# Patient Record
Sex: FEMALE | Race: WHITE | NOT HISPANIC OR LATINO
[De-identification: names, ages, dates, MRNs, and addresses within clinical notes are randomized per-mention and may not be internally consistent; named-entity substitution may affect disease eponyms.]

---

## 2017-01-06 PROBLEM — Z00.00 ENCOUNTER FOR PREVENTIVE HEALTH EXAMINATION: Status: ACTIVE | Noted: 2017-01-06

## 2017-01-08 ENCOUNTER — FORM ENCOUNTER (OUTPATIENT)
Age: 82
End: 2017-01-08

## 2017-01-09 ENCOUNTER — OUTPATIENT (OUTPATIENT)
Dept: OUTPATIENT SERVICES | Facility: HOSPITAL | Age: 82
LOS: 1 days | End: 2017-01-09
Payer: MEDICARE

## 2017-01-09 ENCOUNTER — APPOINTMENT (OUTPATIENT)
Dept: ORTHOPEDIC SURGERY | Facility: CLINIC | Age: 82
End: 2017-01-09

## 2017-01-09 VITALS — WEIGHT: 149 LBS | BODY MASS INDEX: 26.74 KG/M2 | HEIGHT: 62.5 IN

## 2017-01-09 DIAGNOSIS — Z87.39 PERSONAL HISTORY OF OTHER DISEASES OF THE MUSCULOSKELETAL SYSTEM AND CONNECTIVE TISSUE: ICD-10-CM

## 2017-01-09 DIAGNOSIS — Z86.39 PERSONAL HISTORY OF OTHER ENDOCRINE, NUTRITIONAL AND METABOLIC DISEASE: ICD-10-CM

## 2017-01-09 DIAGNOSIS — Z87.81 PERSONAL HISTORY OF (HEALED) TRAUMATIC FRACTURE: ICD-10-CM

## 2017-01-09 DIAGNOSIS — Z60.2 PROBLEMS RELATED TO LIVING ALONE: ICD-10-CM

## 2017-01-09 PROCEDURE — 73501 X-RAY EXAM HIP UNI 1 VIEW: CPT

## 2017-01-09 PROCEDURE — 73502 X-RAY EXAM HIP UNI 2-3 VIEWS: CPT | Mod: 26

## 2017-01-09 SDOH — SOCIAL STABILITY - SOCIAL INSECURITY: PROBLEMS RELATED TO LIVING ALONE: Z60.2

## 2017-02-16 ENCOUNTER — FORM ENCOUNTER (OUTPATIENT)
Age: 82
End: 2017-02-16

## 2017-02-17 ENCOUNTER — APPOINTMENT (OUTPATIENT)
Dept: ORTHOPEDIC SURGERY | Facility: CLINIC | Age: 82
End: 2017-02-17

## 2017-02-17 ENCOUNTER — OUTPATIENT (OUTPATIENT)
Dept: OUTPATIENT SERVICES | Facility: HOSPITAL | Age: 82
LOS: 1 days | End: 2017-02-17
Payer: MEDICARE

## 2017-02-17 PROCEDURE — 73501 X-RAY EXAM HIP UNI 1 VIEW: CPT

## 2017-02-17 PROCEDURE — 73501 X-RAY EXAM HIP UNI 1 VIEW: CPT | Mod: 26,LT

## 2017-03-02 ENCOUNTER — EMERGENCY (EMERGENCY)
Facility: HOSPITAL | Age: 82
LOS: 1 days | Discharge: HOME | End: 2017-03-02
Attending: EMERGENCY MEDICINE | Admitting: EMERGENCY MEDICINE
Payer: MEDICARE

## 2017-03-02 VITALS
TEMPERATURE: 98 F | RESPIRATION RATE: 16 BRPM | DIASTOLIC BLOOD PRESSURE: 75 MMHG | SYSTOLIC BLOOD PRESSURE: 156 MMHG | OXYGEN SATURATION: 100 % | HEART RATE: 77 BPM

## 2017-03-02 VITALS
TEMPERATURE: 97 F | HEART RATE: 62 BPM | OXYGEN SATURATION: 100 % | SYSTOLIC BLOOD PRESSURE: 187 MMHG | WEIGHT: 160.06 LBS | DIASTOLIC BLOOD PRESSURE: 87 MMHG | RESPIRATION RATE: 16 BRPM

## 2017-03-02 DIAGNOSIS — F03.90 UNSPECIFIED DEMENTIA, UNSPECIFIED SEVERITY, WITHOUT BEHAVIORAL DISTURBANCE, PSYCHOTIC DISTURBANCE, MOOD DISTURBANCE, AND ANXIETY: ICD-10-CM
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
## 2017-03-02 DIAGNOSIS — R53.1 WEAKNESS: ICD-10-CM

## 2017-03-02 DIAGNOSIS — Z79.899 OTHER LONG TERM (CURRENT) DRUG THERAPY: ICD-10-CM
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
## 2017-03-02 PROCEDURE — 85025 COMPLETE CBC W/AUTO DIFF WBC: CPT

## 2017-03-02 PROCEDURE — 72192 CT PELVIS W/O DYE: CPT | Mod: 26

## 2017-03-02 PROCEDURE — 85610 PROTHROMBIN TIME: CPT

## 2017-03-02 PROCEDURE — 70450 CT HEAD/BRAIN W/O DYE: CPT | Mod: 26

## 2017-03-02 PROCEDURE — 80053 COMPREHEN METABOLIC PANEL: CPT

## 2017-03-02 PROCEDURE — 72192 CT PELVIS W/O DYE: CPT

## 2017-03-02 PROCEDURE — 99284 EMERGENCY DEPT VISIT MOD MDM: CPT

## 2017-03-02 PROCEDURE — 70450 CT HEAD/BRAIN W/O DYE: CPT

## 2017-03-02 PROCEDURE — 36415 COLL VENOUS BLD VENIPUNCTURE: CPT

## 2017-03-02 PROCEDURE — 85730 THROMBOPLASTIN TIME PARTIAL: CPT

## 2017-03-02 PROCEDURE — 99284 EMERGENCY DEPT VISIT MOD MDM: CPT | Mod: 25

## 2017-03-02 NOTE — ED PROVIDER NOTE - OBJECTIVE STATEMENT
fall in the home attempting to mobilize +HHA called ems patient on buttocks when located  and no injury reported + known severe dementia

## 2017-03-02 NOTE — ED ADULT TRIAGE NOTE - CHIEF COMPLAINT QUOTE
pt BIBA from home with HHA after a mechanical fall while ambulating without walker at home just prior to arrival. Pt with hx of dementia denies head trauma LOC CP SOB or blood thinner use. Admits to pain noted to the buttock area. No obvious trauma or deformities noted. HHA heard the fall and pt was able to stand herself up afteward

## 2017-03-02 NOTE — ED PROVIDER NOTE - MEDICAL DECISION MAKING DETAILS
elderly dementia patient with HHA presents after fall onto buttocks attempting to mobilize --no injury noted or indicated + CT head and pelvis completed and d/c home with services

## 2017-03-02 NOTE — ED ADULT NURSE NOTE - OBJECTIVE STATEMENT
89 y/o female, A & O X 2, BIBA with hx of dementia and hypothyroidism after unwitnessed fall as per HHA. As per HHA, she went to the bathroom and patient attempted to ambulated without her walker and fell to the ground landing on her buttocks. Pt recalls fall but can not describe circumstances. Upon assessment, no visible signs of injury, abdomen soft and non distended, lung fields WNL, breathing is equal and unlabored, pulses palpable, lung fields WNL. Pt denies pain, LOC, SOB, weakness, fatigue, nausea, vomiting, diarrhea, fever, chills, chest pain, palpitations, blurred vision, slurred speech, recent travel. Care in progress. Awaiting disposition.

## 2017-03-02 NOTE — ED PROVIDER NOTE - ATTENDING CONTRIBUTION TO CARE
89F hx dementia, hypothyroid, s/p fall.  per HHA pt was ambulating with walker and fell onto buttock.  no LOC. no vomiting  gen- nad  heent- ncat, clear conj  cv -rrr  lungs -ctab  abd - soft, nt, nd  ext -wwp, stable pelvis  neuro - cruz  CT - no m/s/b, CT pelvis - negative for fractures. pt can be discharged with A

## 2017-04-02 ENCOUNTER — FORM ENCOUNTER (OUTPATIENT)
Age: 82
End: 2017-04-02

## 2017-04-03 ENCOUNTER — APPOINTMENT (OUTPATIENT)
Dept: ORTHOPEDIC SURGERY | Facility: CLINIC | Age: 82
End: 2017-04-03

## 2017-04-03 ENCOUNTER — OUTPATIENT (OUTPATIENT)
Dept: OUTPATIENT SERVICES | Facility: HOSPITAL | Age: 82
LOS: 1 days | End: 2017-04-03
Payer: MEDICARE

## 2017-04-03 PROCEDURE — 73501 X-RAY EXAM HIP UNI 1 VIEW: CPT | Mod: 26,LT

## 2017-04-03 PROCEDURE — 73501 X-RAY EXAM HIP UNI 1 VIEW: CPT

## 2017-06-08 ENCOUNTER — FORM ENCOUNTER (OUTPATIENT)
Age: 82
End: 2017-06-08

## 2017-06-09 ENCOUNTER — OTHER (OUTPATIENT)
Age: 82
End: 2017-06-09

## 2017-06-09 ENCOUNTER — OUTPATIENT (OUTPATIENT)
Dept: OUTPATIENT SERVICES | Facility: HOSPITAL | Age: 82
LOS: 1 days | End: 2017-06-09
Payer: MEDICARE

## 2017-06-09 ENCOUNTER — APPOINTMENT (OUTPATIENT)
Dept: ORTHOPEDIC SURGERY | Facility: CLINIC | Age: 82
End: 2017-06-09

## 2017-06-09 DIAGNOSIS — Z47.89 ENCOUNTER FOR OTHER ORTHOPEDIC AFTERCARE: ICD-10-CM

## 2017-06-09 PROCEDURE — 73502 X-RAY EXAM HIP UNI 2-3 VIEWS: CPT | Mod: 26,LT

## 2017-06-09 PROCEDURE — 73502 X-RAY EXAM HIP UNI 2-3 VIEWS: CPT

## 2017-06-21 ENCOUNTER — MEDICATION RENEWAL (OUTPATIENT)
Age: 82
End: 2017-06-21

## 2017-11-06 ENCOUNTER — MEDICATION RENEWAL (OUTPATIENT)
Age: 82
End: 2017-11-06

## 2019-01-15 ENCOUNTER — INPATIENT (INPATIENT)
Facility: HOSPITAL | Age: 84
LOS: 2 days | Discharge: HOME HEALTH | DRG: 308 | End: 2019-01-18
Attending: INTERNAL MEDICINE | Admitting: INTERNAL MEDICINE
Payer: MEDICARE

## 2019-01-15 VITALS
RESPIRATION RATE: 20 BRPM | WEIGHT: 159.61 LBS | DIASTOLIC BLOOD PRESSURE: 74 MMHG | SYSTOLIC BLOOD PRESSURE: 96 MMHG | HEART RATE: 174 BPM | TEMPERATURE: 98 F | OXYGEN SATURATION: 95 %

## 2019-01-15 DIAGNOSIS — I48.91 UNSPECIFIED ATRIAL FIBRILLATION: ICD-10-CM

## 2019-01-15 DIAGNOSIS — I50.9 HEART FAILURE, UNSPECIFIED: ICD-10-CM

## 2019-01-15 DIAGNOSIS — Z96.642 PRESENCE OF LEFT ARTIFICIAL HIP JOINT: Chronic | ICD-10-CM

## 2019-01-15 DIAGNOSIS — K21.9 GASTRO-ESOPHAGEAL REFLUX DISEASE WITHOUT ESOPHAGITIS: ICD-10-CM

## 2019-01-15 DIAGNOSIS — R09.89 OTHER SPECIFIED SYMPTOMS AND SIGNS INVOLVING THE CIRCULATORY AND RESPIRATORY SYSTEMS: ICD-10-CM

## 2019-01-15 DIAGNOSIS — D72.829 ELEVATED WHITE BLOOD CELL COUNT, UNSPECIFIED: ICD-10-CM

## 2019-01-15 DIAGNOSIS — E03.9 HYPOTHYROIDISM, UNSPECIFIED: ICD-10-CM

## 2019-01-15 LAB
ALBUMIN SERPL ELPH-MCNC: 4.1 G/DL — SIGNIFICANT CHANGE UP (ref 3.3–5)
ALP SERPL-CCNC: 117 U/L — SIGNIFICANT CHANGE UP (ref 40–120)
ALT FLD-CCNC: 7 U/L — LOW (ref 10–45)
ANION GAP SERPL CALC-SCNC: 15 MMOL/L — SIGNIFICANT CHANGE UP (ref 5–17)
APPEARANCE UR: CLEAR — SIGNIFICANT CHANGE UP
APTT BLD: 27.7 SEC — SIGNIFICANT CHANGE UP (ref 27.5–36.3)
AST SERPL-CCNC: 14 U/L — SIGNIFICANT CHANGE UP (ref 10–40)
BACTERIA # UR AUTO: PRESENT /HPF
BASOPHILS NFR BLD AUTO: 0.3 % — SIGNIFICANT CHANGE UP (ref 0–2)
BILIRUB SERPL-MCNC: 1 MG/DL — SIGNIFICANT CHANGE UP (ref 0.2–1.2)
BILIRUB UR-MCNC: NEGATIVE — SIGNIFICANT CHANGE UP
BUN SERPL-MCNC: 24 MG/DL — HIGH (ref 7–23)
CALCIUM SERPL-MCNC: 11.7 MG/DL — HIGH (ref 8.4–10.5)
CHLORIDE SERPL-SCNC: 102 MMOL/L — SIGNIFICANT CHANGE UP (ref 96–108)
CK MB CFR SERPL CALC: 1.8 NG/ML — SIGNIFICANT CHANGE UP (ref 0–6.7)
CK SERPL-CCNC: 41 U/L — SIGNIFICANT CHANGE UP (ref 25–170)
CO2 SERPL-SCNC: 22 MMOL/L — SIGNIFICANT CHANGE UP (ref 22–31)
COLOR SPEC: YELLOW — SIGNIFICANT CHANGE UP
CREAT SERPL-MCNC: 1.01 MG/DL — SIGNIFICANT CHANGE UP (ref 0.5–1.3)
DIFF PNL FLD: NEGATIVE — SIGNIFICANT CHANGE UP
EOSINOPHIL NFR BLD AUTO: 1.7 % — SIGNIFICANT CHANGE UP (ref 0–6)
EPI CELLS # UR: SIGNIFICANT CHANGE UP /HPF (ref 0–5)
GLUCOSE SERPL-MCNC: 139 MG/DL — HIGH (ref 70–99)
GLUCOSE UR QL: NEGATIVE — SIGNIFICANT CHANGE UP
HCT VFR BLD CALC: 45.8 % — HIGH (ref 34.5–45)
HGB BLD-MCNC: 14.8 G/DL — SIGNIFICANT CHANGE UP (ref 11.5–15.5)
INR BLD: 1.53 — HIGH (ref 0.88–1.16)
KETONES UR-MCNC: NEGATIVE — SIGNIFICANT CHANGE UP
LEUKOCYTE ESTERASE UR-ACNC: ABNORMAL
LYMPHOCYTES # BLD AUTO: 14.9 % — SIGNIFICANT CHANGE UP (ref 13–44)
MAGNESIUM SERPL-MCNC: 2 MG/DL — SIGNIFICANT CHANGE UP (ref 1.6–2.6)
MCHC RBC-ENTMCNC: 32.3 G/DL — SIGNIFICANT CHANGE UP (ref 32–36)
MCHC RBC-ENTMCNC: 32.8 PG — SIGNIFICANT CHANGE UP (ref 27–34)
MCV RBC AUTO: 101.6 FL — HIGH (ref 80–100)
MONOCYTES NFR BLD AUTO: 10.7 % — SIGNIFICANT CHANGE UP (ref 2–14)
NEUTROPHILS NFR BLD AUTO: 72.4 % — SIGNIFICANT CHANGE UP (ref 43–77)
NITRITE UR-MCNC: NEGATIVE — SIGNIFICANT CHANGE UP
PH UR: 6 — SIGNIFICANT CHANGE UP (ref 5–8)
PLATELET # BLD AUTO: 131 K/UL — LOW (ref 150–400)
POTASSIUM SERPL-MCNC: 4.4 MMOL/L — SIGNIFICANT CHANGE UP (ref 3.5–5.3)
POTASSIUM SERPL-SCNC: 4.4 MMOL/L — SIGNIFICANT CHANGE UP (ref 3.5–5.3)
PROT SERPL-MCNC: 7.5 G/DL — SIGNIFICANT CHANGE UP (ref 6–8.3)
PROT UR-MCNC: NEGATIVE MG/DL — SIGNIFICANT CHANGE UP
PROTHROM AB SERPL-ACNC: 17.5 SEC — HIGH (ref 10–12.9)
RAPID RVP RESULT: SIGNIFICANT CHANGE UP
RBC # BLD: 4.51 M/UL — SIGNIFICANT CHANGE UP (ref 3.8–5.2)
RBC # FLD: 13.8 % — SIGNIFICANT CHANGE UP (ref 10.3–16.9)
RBC CASTS # UR COMP ASSIST: < 5 /HPF — SIGNIFICANT CHANGE UP
SODIUM SERPL-SCNC: 139 MMOL/L — SIGNIFICANT CHANGE UP (ref 135–145)
SP GR SPEC: 1.01 — SIGNIFICANT CHANGE UP (ref 1–1.03)
T3FREE SERPL-MCNC: 1.42 PG/ML — LOW (ref 1.71–3.71)
T4 FREE SERPL-MCNC: 1.39 NG/DL — SIGNIFICANT CHANGE UP (ref 0.7–1.48)
TROPONIN T SERPL-MCNC: <0.01 NG/ML — SIGNIFICANT CHANGE UP (ref 0–0.01)
TSH SERPL-MCNC: 0.2 UIU/ML — LOW (ref 0.35–4.94)
UROBILINOGEN FLD QL: 0.2 E.U./DL — SIGNIFICANT CHANGE UP
WBC # BLD: 13 K/UL — HIGH (ref 3.8–10.5)
WBC # FLD AUTO: 13 K/UL — HIGH (ref 3.8–10.5)
WBC UR QL: ABNORMAL /HPF

## 2019-01-15 PROCEDURE — 99291 CRITICAL CARE FIRST HOUR: CPT

## 2019-01-15 PROCEDURE — 71275 CT ANGIOGRAPHY CHEST: CPT | Mod: 26

## 2019-01-15 RX ORDER — FUROSEMIDE 40 MG
40 TABLET ORAL ONCE
Qty: 0 | Refills: 0 | Status: COMPLETED | OUTPATIENT
Start: 2019-01-15 | End: 2019-01-15

## 2019-01-15 RX ORDER — ACETAMINOPHEN 500 MG
650 TABLET ORAL EVERY 6 HOURS
Qty: 0 | Refills: 0 | Status: DISCONTINUED | OUTPATIENT
Start: 2019-01-15 | End: 2019-01-18

## 2019-01-15 RX ORDER — MEMANTINE HYDROCHLORIDE 10 MG/1
10 TABLET ORAL
Qty: 0 | Refills: 0 | Status: DISCONTINUED | OUTPATIENT
Start: 2019-01-15 | End: 2019-01-18

## 2019-01-15 RX ORDER — METOPROLOL TARTRATE 50 MG
10 TABLET ORAL ONCE
Qty: 0 | Refills: 0 | Status: COMPLETED | OUTPATIENT
Start: 2019-01-15 | End: 2019-01-15

## 2019-01-15 RX ORDER — HEPARIN SODIUM 5000 [USP'U]/ML
1300 INJECTION INTRAVENOUS; SUBCUTANEOUS
Qty: 25000 | Refills: 0 | Status: DISCONTINUED | OUTPATIENT
Start: 2019-01-15 | End: 2019-01-16

## 2019-01-15 RX ORDER — METOPROLOL TARTRATE 50 MG
50 TABLET ORAL ONCE
Qty: 0 | Refills: 0 | Status: COMPLETED | OUTPATIENT
Start: 2019-01-15 | End: 2019-01-15

## 2019-01-15 RX ORDER — LEVOTHYROXINE SODIUM 125 MCG
100 TABLET ORAL DAILY
Qty: 0 | Refills: 0 | Status: DISCONTINUED | OUTPATIENT
Start: 2019-01-15 | End: 2019-01-16

## 2019-01-15 RX ORDER — SODIUM CHLORIDE 9 MG/ML
1000 INJECTION INTRAMUSCULAR; INTRAVENOUS; SUBCUTANEOUS ONCE
Qty: 0 | Refills: 0 | Status: COMPLETED | OUTPATIENT
Start: 2019-01-15 | End: 2019-01-15

## 2019-01-15 RX ORDER — FUROSEMIDE 40 MG
40 TABLET ORAL
Qty: 0 | Refills: 0 | Status: DISCONTINUED | OUTPATIENT
Start: 2019-01-16 | End: 2019-01-16

## 2019-01-15 RX ORDER — PANTOPRAZOLE SODIUM 20 MG/1
40 TABLET, DELAYED RELEASE ORAL
Qty: 0 | Refills: 0 | Status: DISCONTINUED | OUTPATIENT
Start: 2019-01-15 | End: 2019-01-18

## 2019-01-15 RX ADMIN — Medication 50 MILLIGRAM(S): at 17:28

## 2019-01-15 RX ADMIN — Medication 650 MILLIGRAM(S): at 21:14

## 2019-01-15 RX ADMIN — Medication 10 MILLIGRAM(S): at 18:51

## 2019-01-15 RX ADMIN — Medication 650 MILLIGRAM(S): at 22:14

## 2019-01-15 RX ADMIN — Medication 10 MILLIGRAM(S): at 16:57

## 2019-01-15 RX ADMIN — SODIUM CHLORIDE 2000 MILLILITER(S): 9 INJECTION INTRAMUSCULAR; INTRAVENOUS; SUBCUTANEOUS at 18:43

## 2019-01-15 RX ADMIN — Medication 40 MILLIGRAM(S): at 20:50

## 2019-01-15 RX ADMIN — HEPARIN SODIUM 13 UNIT(S)/HR: 5000 INJECTION INTRAVENOUS; SUBCUTANEOUS at 17:13

## 2019-01-15 NOTE — ED PROVIDER NOTE - ATTENDING CONTRIBUTION TO CARE
89yo F here w/ asymptomatic afib with RVR. pt is elderly but well appearing, non toxic, perfusing all extremities, but going at HR 180s upon arrival to the ED. Rate controlled w/ IV medications. family at bedside. pts pcp updated, plan for admission and anticoagulation.

## 2019-01-15 NOTE — ED ADULT NURSE NOTE - CHPI ED NUR SYMPTOMS NEG
no nausea/no vomiting/no chest pain/no dizziness/no fever/no back pain/no chills/no syncope/no congestion/no diaphoresis/no shortness of breath

## 2019-01-15 NOTE — ED PROVIDER NOTE - CHPI ED SYMPTOMS NEG
no chills/no diaphoresis/no dizziness/no cough/no syncope/no shortness of breath/no vomiting/no back pain/no chest pain/no fever/no nausea

## 2019-01-15 NOTE — H&P ADULT - ASSESSMENT
89 yo female, PMHx mild dementia (A&O x 2, no oriented to year) Hypothyroidism, GERD, h/o Left hip fracture 2017 s/p ORIF by Dr Sharma at [facility] presented to Dr Marmolejo's office after outpt physical therapist noticed LE swelling R>L. Pt went to Dr Marmolejo (PCP's) office who found her to be in new onset afib with RVR 180s and pt was sent to [facility] for further management. Pt denies chest pain, SOB, dizziness, palpitations, nausea, orthopnea. In [facility] ED BP 94/74  RR 16 Temp 97.9F O2 sat 95% room air. BNP 5250. WBC 13. No shift. Trop neg x 1. EKG AFib 172 with TWI I, AVL. Pt given a total of Metoprolol Tartrate IV 20mg IV and 50mg PO x 1 in ED. Pt's HR still in 140s. Heparin drip stated for AC. CTA chest negative for PE, showed small b/l pleural effusions. Lasix 40mg IV x 1 given for acute congestive heart failure. CCU fellow called for bedside Echo to determine pt's EF for further Afib management. Pt being admitted to [facility] for further management of new onset afib with RVR and new onset congestive heart failure management.

## 2019-01-15 NOTE — H&P ADULT - PROBLEM SELECTOR PLAN 2
- LE edema b/l R>L. Lungs decreased breath sounds b/l bases  - BNP 5250  - CTA chest PE protocol showed small b/l pleural effusions but no PE  - CCU fellow called for bedside Echo to determine EF  - Lasix 40mg IV x 1 given.   - Pt hypotensive on arrival to 90s/60s 2/2 afib with RVR. BP 120s/80s with improvement in heart rate. Avoid ACE/ARB until HR better controlled 2/2 concern for hypotension. - LE edema b/l R>L. Lungs decreased breath sounds b/l bases  - BNP 5250  - CTA chest PE protocol showed small b/l pleural effusions but no PE  - CCU fellow called for bedside Echo to determine EF showed probable normal EF. F/u official Echo in AM  - Lasix 40mg IV x 1 given.   - Pt hypotensive on arrival to 90s/60s 2/2 afib with RVR. BP 120s/80s with improvement in heart rate. Avoid ACE/ARB until HR better controlled 2/2 concern for hypotension.

## 2019-01-15 NOTE — ED ADULT NURSE NOTE - OBJECTIVE STATEMENT
89 y/o F without complaints sent in by Dr Marmolejo for evaluation for PE secondary to RLE swelling and rapid heartbeat 170s. Pt denies palpitations, SOB, CP. Bilateral 18g placed, blood sent. Medicated per order.

## 2019-01-15 NOTE — ED PROVIDER NOTE - CARE PLAN
Principal Discharge DX:	Atrial fibrillation, unspecified type Principal Discharge DX:	Atrial fibrillation with rapid ventricular response  Secondary Diagnosis:	Acute congestive heart failure, unspecified heart failure type Principal Discharge DX:	Atrial fibrillation with rapid ventricular response  Assessment and plan of treatment:	CT angio chest negative for PE; admit to cardiology  Secondary Diagnosis:	Acute congestive heart failure, unspecified heart failure type

## 2019-01-15 NOTE — H&P ADULT - HISTORY OF PRESENT ILLNESS
89 yo female, PMHx mild dementia (A&O x 2, no oriented to year) Hypothyroidism, GERD, h/o Left hip fracture 2017 s/p ORIF by Dr Sharma at [facility] presented to Dr Marmolejo's office after outpt physical therapist noticed LE swelling R>L. Pt went to Dr Marmolejo (PCP's) office who found her to be in new onset afib with RVR 180s and pt was sent to [facility] for further management. Pt denies chest pain, SOB, dizziness, palpitations, nausea, orthopnea. In [facility] ED BP 94/74  RR 16 Temp 97.9F O2 sat 95% room air. BNP 5250. WBC 13. No shift. Trop neg x 1. EKG AFib 172 with TWI I, AVL. Pt given a total of Metoprolol Tartrate IV 20mg IV and 50mg PO x 1 in ED. Pt's HR still in 140s. Heparin drip stated for AC. CTA chest negative for PE, showed small b/l pleural effusions. Lasix 40mg IV x 1 given for acute congestive heart failure. CCU fellow called for bedside Echo to determine pt's EF for further Afib management. Pt being admitted to [facility] for further management of new onset afib with RVR and new onset congestive heart failure management.

## 2019-01-15 NOTE — ED ADULT NURSE NOTE - NSIMPLEMENTINTERV_GEN_ALL_ED
Implemented All Fall with Harm Risk Interventions:  [place] to call system. Call bell, personal items and telephone within reach. Instruct patient to call for assistance. Room bathroom lighting operational. Non-slip footwear when patient is off stretcher. Physically safe environment: no spills, clutter or unnecessary equipment. Stretcher in lowest position, wheels locked, appropriate side rails in place. Provide visual cue, wrist band, yellow gown, etc. Monitor gait and stability. Monitor for mental status changes and reorient to person, place, and time. Review medications for side effects contributing to fall risk. Reinforce activity limits and safety measures with patient and family. Provide visual clues: red socks.

## 2019-01-15 NOTE — ED PROVIDER NOTE - PROGRESS NOTE DETAILS
HR decreased s/p IV lopressor 10mg x2 and PO lopressor 50mg. Also started heparin gtt and s/p 1L NS. Awaiting CT angio PE protocol

## 2019-01-15 NOTE — H&P ADULT - PROBLEM SELECTOR PLAN 1
- currently asymptomatic  - new onset afib with RVR to 180s on arrival, reduced to 140s with Lopressor 20mg IV and 50mg PO x 1.   - CCU fellow called to perform beside Echo to assess LV function to further determine rate controlling medication options  - heparin drip started for AC - currently asymptomatic  - new onset afib with RVR to 180s on arrival, reduced to 140s with Lopressor 20mg IV and 50mg PO x 1.   - CCU fellow performed beside Echo showing probable normal EF. Cardizem 10mg IV x 1 given, f/u formal Echo on 1/16/19  - heparin drip started for AC

## 2019-01-15 NOTE — ED ADULT TRIAGE NOTE - CHIEF COMPLAINT QUOTE
patient with swelling to right leg since this morning. . sent in to r/o DVT and new onset rapid a-fib. denies chest pain, sob

## 2019-01-15 NOTE — H&P ADULT - PROBLEM SELECTOR PLAN 5
- WBC 13 with on shift on admission. Pt afebrile, repeat in AM  - CTA chest PE protocol shows no signs of PNA  - UA/UCX ordered  - RVP performed, f/u results    Pt has old CONTACT Isolation order from 12/31/16 for MDR M Mor[place] in urine. No need to continue pt on contact as per Epidemiology. They will remove the order from [place] on 1/16/19 AM  Code status: FULL CODE  Dispo: pt has 24 hour home care and daughter states she will go home no matter what the recommendations are. PT consult is ordered to assess current needs.

## 2019-01-16 DIAGNOSIS — I82.409 ACUTE EMBOLISM AND THROMBOSIS OF UNSPECIFIED DEEP VEINS OF UNSPECIFIED LOWER EXTREMITY: ICD-10-CM

## 2019-01-16 DIAGNOSIS — I50.31 ACUTE DIASTOLIC (CONGESTIVE) HEART FAILURE: ICD-10-CM

## 2019-01-16 LAB
ALBUMIN SERPL ELPH-MCNC: 3.2 G/DL — LOW (ref 3.3–5)
ALP SERPL-CCNC: 108 U/L — SIGNIFICANT CHANGE UP (ref 40–120)
ALT FLD-CCNC: 17 U/L — SIGNIFICANT CHANGE UP (ref 10–45)
ANION GAP SERPL CALC-SCNC: 16 MMOL/L — SIGNIFICANT CHANGE UP (ref 5–17)
APTT BLD: 151.6 SEC — CRITICAL HIGH (ref 27.5–36.3)
APTT BLD: 159.5 SEC — CRITICAL HIGH (ref 27.5–36.3)
APTT BLD: 74.5 SEC — HIGH (ref 27.5–36.3)
APTT BLD: 87.5 SEC — HIGH (ref 27.5–36.3)
AST SERPL-CCNC: 24 U/L — SIGNIFICANT CHANGE UP (ref 10–40)
BILIRUB SERPL-MCNC: 0.7 MG/DL — SIGNIFICANT CHANGE UP (ref 0.2–1.2)
BLD GP AB SCN SERPL QL: NEGATIVE — SIGNIFICANT CHANGE UP
BUN SERPL-MCNC: 21 MG/DL — SIGNIFICANT CHANGE UP (ref 7–23)
CALCIUM SERPL-MCNC: 10.2 MG/DL — SIGNIFICANT CHANGE UP (ref 8.4–10.5)
CHLORIDE SERPL-SCNC: 104 MMOL/L — SIGNIFICANT CHANGE UP (ref 96–108)
CO2 SERPL-SCNC: 20 MMOL/L — LOW (ref 22–31)
CREAT SERPL-MCNC: 0.8 MG/DL — SIGNIFICANT CHANGE UP (ref 0.5–1.3)
GLUCOSE SERPL-MCNC: 102 MG/DL — HIGH (ref 70–99)
HCT VFR BLD CALC: 40.2 % — SIGNIFICANT CHANGE UP (ref 34.5–45)
HGB BLD-MCNC: 12.9 G/DL — SIGNIFICANT CHANGE UP (ref 11.5–15.5)
MAGNESIUM SERPL-MCNC: 1.7 MG/DL — SIGNIFICANT CHANGE UP (ref 1.6–2.6)
MCHC RBC-ENTMCNC: 32.1 G/DL — SIGNIFICANT CHANGE UP (ref 32–36)
MCHC RBC-ENTMCNC: 32.6 PG — SIGNIFICANT CHANGE UP (ref 27–34)
MCV RBC AUTO: 101.5 FL — HIGH (ref 80–100)
PLATELET # BLD AUTO: 120 K/UL — LOW (ref 150–400)
POTASSIUM SERPL-MCNC: 4 MMOL/L — SIGNIFICANT CHANGE UP (ref 3.5–5.3)
POTASSIUM SERPL-SCNC: 4 MMOL/L — SIGNIFICANT CHANGE UP (ref 3.5–5.3)
PROT SERPL-MCNC: 5.9 G/DL — LOW (ref 6–8.3)
RBC # BLD: 3.96 M/UL — SIGNIFICANT CHANGE UP (ref 3.8–5.2)
RBC # FLD: 13.7 % — SIGNIFICANT CHANGE UP (ref 10.3–16.9)
RH IG SCN BLD-IMP: POSITIVE — SIGNIFICANT CHANGE UP
SODIUM SERPL-SCNC: 140 MMOL/L — SIGNIFICANT CHANGE UP (ref 135–145)
WBC # BLD: 9.3 K/UL — SIGNIFICANT CHANGE UP (ref 3.8–10.5)
WBC # FLD AUTO: 9.3 K/UL — SIGNIFICANT CHANGE UP (ref 3.8–10.5)

## 2019-01-16 PROCEDURE — 99222 1ST HOSP IP/OBS MODERATE 55: CPT

## 2019-01-16 PROCEDURE — 93306 TTE W/DOPPLER COMPLETE: CPT | Mod: 26

## 2019-01-16 PROCEDURE — 93970 EXTREMITY STUDY: CPT | Mod: 26

## 2019-01-16 RX ORDER — MAGNESIUM OXIDE 400 MG ORAL TABLET 241.3 MG
800 TABLET ORAL ONCE
Qty: 0 | Refills: 0 | Status: COMPLETED | OUTPATIENT
Start: 2019-01-16 | End: 2019-01-16

## 2019-01-16 RX ORDER — HEPARIN SODIUM 5000 [USP'U]/ML
6000 INJECTION INTRAVENOUS; SUBCUTANEOUS EVERY 6 HOURS
Qty: 0 | Refills: 0 | Status: DISCONTINUED | OUTPATIENT
Start: 2019-01-16 | End: 2019-01-18

## 2019-01-16 RX ORDER — SODIUM CHLORIDE 9 MG/ML
250 INJECTION INTRAMUSCULAR; INTRAVENOUS; SUBCUTANEOUS ONCE
Qty: 0 | Refills: 0 | Status: COMPLETED | OUTPATIENT
Start: 2019-01-16 | End: 2019-01-16

## 2019-01-16 RX ORDER — HEPARIN SODIUM 5000 [USP'U]/ML
1000 INJECTION INTRAVENOUS; SUBCUTANEOUS
Qty: 25000 | Refills: 0 | Status: DISCONTINUED | OUTPATIENT
Start: 2019-01-16 | End: 2019-01-18

## 2019-01-16 RX ORDER — HEPARIN SODIUM 5000 [USP'U]/ML
3000 INJECTION INTRAVENOUS; SUBCUTANEOUS EVERY 6 HOURS
Qty: 0 | Refills: 0 | Status: DISCONTINUED | OUTPATIENT
Start: 2019-01-16 | End: 2019-01-18

## 2019-01-16 RX ORDER — LEVOTHYROXINE SODIUM 125 MCG
88 TABLET ORAL DAILY
Qty: 0 | Refills: 0 | Status: DISCONTINUED | OUTPATIENT
Start: 2019-01-17 | End: 2019-01-17

## 2019-01-16 RX ORDER — MAGNESIUM SULFATE 500 MG/ML
1 VIAL (ML) INJECTION ONCE
Qty: 0 | Refills: 0 | Status: DISCONTINUED | OUTPATIENT
Start: 2019-01-16 | End: 2019-01-16

## 2019-01-16 RX ADMIN — MEMANTINE HYDROCHLORIDE 10 MILLIGRAM(S): 10 TABLET ORAL at 05:49

## 2019-01-16 RX ADMIN — HEPARIN SODIUM 0 UNIT(S)/HR: 5000 INJECTION INTRAVENOUS; SUBCUTANEOUS at 07:13

## 2019-01-16 RX ADMIN — PANTOPRAZOLE SODIUM 40 MILLIGRAM(S): 20 TABLET, DELAYED RELEASE ORAL at 06:01

## 2019-01-16 RX ADMIN — MEMANTINE HYDROCHLORIDE 10 MILLIGRAM(S): 10 TABLET ORAL at 18:23

## 2019-01-16 RX ADMIN — Medication 40 MILLIGRAM(S): at 05:49

## 2019-01-16 RX ADMIN — HEPARIN SODIUM 800 UNIT(S)/HR: 5000 INJECTION INTRAVENOUS; SUBCUTANEOUS at 16:18

## 2019-01-16 RX ADMIN — MAGNESIUM OXIDE 400 MG ORAL TABLET 800 MILLIGRAM(S): 241.3 TABLET ORAL at 11:29

## 2019-01-16 RX ADMIN — Medication 100 MICROGRAM(S): at 05:49

## 2019-01-16 RX ADMIN — HEPARIN SODIUM 1000 UNIT(S)/HR: 5000 INJECTION INTRAVENOUS; SUBCUTANEOUS at 01:12

## 2019-01-16 RX ADMIN — HEPARIN SODIUM 800 UNIT(S)/HR: 5000 INJECTION INTRAVENOUS; SUBCUTANEOUS at 08:13

## 2019-01-16 RX ADMIN — SODIUM CHLORIDE 1000 MILLILITER(S): 9 INJECTION INTRAMUSCULAR; INTRAVENOUS; SUBCUTANEOUS at 09:18

## 2019-01-16 RX ADMIN — HEPARIN SODIUM 800 UNIT(S)/HR: 5000 INJECTION INTRAVENOUS; SUBCUTANEOUS at 22:20

## 2019-01-16 NOTE — PROGRESS NOTE ADULT - PROBLEM SELECTOR PLAN 6
- WBC 13 with on shift on admission. Pt afebrile, repeat WBC on 9.  - CTA chest PE protocol shows no signs of PNA  - UA showed moderate leukocytes and negative nitrites. UCX pending.   - RVP negative.    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 No need to continue pt on contact as per Epidemiology. They will remove the order from [place] on 1/16/19 AM  Code status: FULL CODE  Dispo: pt has 24 hour home care and daughter states she will go home no matter what the recommendations are. PT consult is ordered to assess current needs.

## 2019-01-16 NOTE — PHYSICAL THERAPY INITIAL EVALUATION ADULT - MODALITIES TREATMENT COMMENTS
Ambulation distance limited by tachy HR (145bpm) which returned to normal (99bpm) by end of session in supine position.

## 2019-01-16 NOTE — PROGRESS NOTE ADULT - PROBLEM SELECTOR PLAN 2
- LE edema b/l R>L improving in left LE, RLE still edematous. Lungs CTA b/l, no JVD.  - BNP 5250 on admission  - Lasix 40mg IV given on 1/15/19 PM and Lasix 40mg IV 1/16/19 AM. Pt's lungs CTA b/l, no JVD, improving LE edema. Pt was hypotensive on 1/16/19 AM and NS 250cc bolus given. Lasix currently on hold.   - Pt intermittently hypotensive during admission. Avoid ACE/ARB until HR better controlled 2/2 concern for hypotension.

## 2019-01-16 NOTE — PROGRESS NOTE ADULT - PROBLEM SELECTOR PLAN 4
- Pt on Synthroid 100mcg daily as outpt. Decreasing to Synthroid 88mcg daily.   - TSH 0.2. Free T3 mildly low and Free T4 normal.

## 2019-01-16 NOTE — CONSULT NOTE ADULT - SUBJECTIVE AND OBJECTIVE BOX
HPI:  89 yo female with mild dementia (A&O x 2, not oriented to year) Hypothyroidism, GERD, h/o Left hip fracture 2017 s/p ORIF, who presented to Dr Marmolejo's office after outpt physical therapist noticed LE swelling R>L and was found to be in afib with RVR.  EP called for further recommendations.    Both the patient and her family deny any known history of arrhythmia.  She was sent to her PMDs office after she was found to have LE edema.  When in Dr Marmolejo (PCP's) office who found her to be in new onset afib with RVR 180s and pt was sent to [facility] ER.  She was asymptomatic and denied any chest pain, SOB, dizziness, palpitations, syncope or near syncope, orthopnea. Poor rate control with a beta blocker and her EF came back normal and she was given a CCB with better rate control.  She was placed on IV heparin  .   PAST MEDICAL & SURGICAL HISTORY:  GERD (gastroesophageal reflux disease)  Dementia  Hypothyroid  History of hip replacement, total, left      No pertinent family history in first degree relatives      Social History:no smoking, no drugs, no alcohol    Inpatient Medications:   acetaminophen   Tablet .. 650 milliGRAM(s) Oral every 6 hours PRN  diltiazem    Tablet 30 milliGRAM(s) Oral two times a day  heparin  Infusion. 1000 Unit(s)/Hr IV Continuous <Continuous>  memantine 10 milliGRAM(s) Oral two times a day  pantoprazole    Tablet 40 milliGRAM(s) Oral before breakfast      Allergies: No Known Allergies      ROS:   CONSTITUTIONAL: No fever, weight loss   EYES: Pt denies  RESPIRATORY: No cough, wheezing, chills or hemoptysis; No Shortness of Breath  CARDIOVASCULAR: see HPI  GASTROINTESTINAL: Pt denies  NEUROLOGICAL: Pt denies  SKIN: Pt denies   PSYCHIATRIC: Pt denies  HEME/LYMPH: Pt denies    PHYSICAL:  T(C): 36.6 (01-16-19 @ 13:25), Max: 36.8 (01-15-19 @ 21:35)  HR: 140 (01-16-19 @ 16:41) (75 - 143)  BP: 142/97 (01-16-19 @ 16:41) (82/56 - 143/75)  RR: 17 (01-16-19 @ 16:41) (16 - 18)  SpO2: 93% (01-16-19 @ 16:41) (91% - 97%)  Appearance: No acute distress, well developed  Eyes: normal appearing conjunctiva, pupils and eyelids  Cardiovascular: irregularly irregular  Respiratory: Lungs clear to auscultation	   Neurologic:  No deficit noted  Psych: A&Ox3, normal mood/affect  Musculoskeletal: currently in bed getting LE doppler - LE edema R>L  Skin: no rash noted, normal color and pigmentation.        LABS:                        12.9   9.3   )-----------( 120      ( 16 Jan 2019 06:36 )             40.2     140  |  104  |  21  ----------------------------<  102<H>  4.0   |  20<L>  |  0.80       Mg     1.7       TPro  5.9<L>  /  Alb  3.2<L>  /  TBili  0.7  /  DBili  x   /  AST  24  /  ALT  17  /  AlkPhos  108   PT: 17.5 sec;   INR: 1.53      PTT:87.5 sec  TSH 0.24 --> plans to reduce synthroid   EKG: afib with a ventricular rate of 70    ECHO: EF 60-65% mod LAEmild pHTN    Prior EP procedures: denies    Assessment Plan:  89 yo female with mild dementia (A&O x 2, not oriented to year) Hypothyroidism, GERD, h/o Left hip fracture 2017 s/p ORIF, who presented to Dr Marmolejo's office after outpt physical therapist noticed LE swelling R>L and was found to be in afib with RVR.  While in her room she was getting a LE US with extensive DVT clot in R leg (L not scanned yet).  She will need to be on DVT dosed eliquis and after maintenance for atrial fibrillation.  Primary team states CT scan showed concern for malignancy which would be in line with DVT.  CT scan did not show a PE but I suspect she had one that could have lysed and this is what is driving her atrial fibrillation given her rapid rates.  She has a normal EF and would uptitrate as BP allows to control HR <120.  We discussed that she is a candidate for a LINDA/cardioversion - once primary team has completed work up (can be done right before discharge).  They are in agreement.  Will anticipate tomorrow or Friday pending workup.  She was not formally consented as she was still getting a US of her LE and this will need to be done prior to her LINDA/Cardioversion.  Please call [phone] with any questions or concerns.

## 2019-01-16 NOTE — PHYSICAL THERAPY INITIAL EVALUATION ADULT - PERTINENT HX OF CURRENT PROBLEM, REHAB EVAL
89 yo female, PMHx mild dementia (A&O x 2, no oriented to year) Hypothyroidism, GERD, h/o Left hip fracture 2017 s/p ORIF by Dr Sharma at [facility] presented to Dr Marmolejo's office after outpt physical therapist noticed LE swelling R>L. Pt went to Dr Marmolejo (PCP's) office who found her to be in new onset afib with RVR 180s and pt was sent to [facility] for further management

## 2019-01-16 NOTE — PHYSICAL THERAPY INITIAL EVALUATION ADULT - ADDITIONAL COMMENTS
Patient previously was able to dress herself but required assistance with all ADLs and IADLs with help of home health aide, 1 aide present 24 hrs/day. Previously used a rollator for ambulation. She was attending outpatient PT for her legs.

## 2019-01-17 ENCOUNTER — TRANSCRIPTION ENCOUNTER (OUTPATIENT)
Age: 84
End: 2019-01-17

## 2019-01-17 DIAGNOSIS — I82.431 ACUTE EMBOLISM AND THROMBOSIS OF RIGHT POPLITEAL VEIN: ICD-10-CM

## 2019-01-17 DIAGNOSIS — E07.9 DISORDER OF THYROID, UNSPECIFIED: ICD-10-CM

## 2019-01-17 LAB
ALBUMIN SERPL ELPH-MCNC: 3.3 G/DL — SIGNIFICANT CHANGE UP (ref 3.3–5)
ALP SERPL-CCNC: 96 U/L — SIGNIFICANT CHANGE UP (ref 40–120)
ALT FLD-CCNC: 11 U/L — SIGNIFICANT CHANGE UP (ref 10–45)
ANION GAP SERPL CALC-SCNC: 14 MMOL/L — SIGNIFICANT CHANGE UP (ref 5–17)
APTT BLD: 83.3 SEC — HIGH (ref 27.5–36.3)
AST SERPL-CCNC: 15 U/L — SIGNIFICANT CHANGE UP (ref 10–40)
BILIRUB SERPL-MCNC: 0.6 MG/DL — SIGNIFICANT CHANGE UP (ref 0.2–1.2)
BUN SERPL-MCNC: 22 MG/DL — SIGNIFICANT CHANGE UP (ref 7–23)
CALCIUM SERPL-MCNC: 10 MG/DL — SIGNIFICANT CHANGE UP (ref 8.4–10.5)
CHLORIDE SERPL-SCNC: 105 MMOL/L — SIGNIFICANT CHANGE UP (ref 96–108)
CO2 SERPL-SCNC: 25 MMOL/L — SIGNIFICANT CHANGE UP (ref 22–31)
CREAT SERPL-MCNC: 0.74 MG/DL — SIGNIFICANT CHANGE UP (ref 0.5–1.3)
CULTURE RESULTS: SIGNIFICANT CHANGE UP
GLUCOSE SERPL-MCNC: 101 MG/DL — HIGH (ref 70–99)
HCT VFR BLD CALC: 39 % — SIGNIFICANT CHANGE UP (ref 34.5–45)
HGB BLD-MCNC: 12.5 G/DL — SIGNIFICANT CHANGE UP (ref 11.5–15.5)
INR BLD: 1.53 — HIGH (ref 0.88–1.16)
MAGNESIUM SERPL-MCNC: 1.9 MG/DL — SIGNIFICANT CHANGE UP (ref 1.6–2.6)
MCHC RBC-ENTMCNC: 31.8 PG — SIGNIFICANT CHANGE UP (ref 27–34)
MCHC RBC-ENTMCNC: 32.1 G/DL — SIGNIFICANT CHANGE UP (ref 32–36)
MCV RBC AUTO: 99.2 FL — SIGNIFICANT CHANGE UP (ref 80–100)
PLATELET # BLD AUTO: 150 K/UL — SIGNIFICANT CHANGE UP (ref 150–400)
POTASSIUM SERPL-MCNC: 3.6 MMOL/L — SIGNIFICANT CHANGE UP (ref 3.5–5.3)
POTASSIUM SERPL-SCNC: 3.6 MMOL/L — SIGNIFICANT CHANGE UP (ref 3.5–5.3)
PROT SERPL-MCNC: 6 G/DL — SIGNIFICANT CHANGE UP (ref 6–8.3)
PROTHROM AB SERPL-ACNC: 17.5 SEC — HIGH (ref 10–12.9)
RBC # BLD: 3.93 M/UL — SIGNIFICANT CHANGE UP (ref 3.8–5.2)
RBC # FLD: 14 % — SIGNIFICANT CHANGE UP (ref 10.3–16.9)
SODIUM SERPL-SCNC: 144 MMOL/L — SIGNIFICANT CHANGE UP (ref 135–145)
SPECIMEN SOURCE: SIGNIFICANT CHANGE UP
WBC # BLD: 8.6 K/UL — SIGNIFICANT CHANGE UP (ref 3.8–10.5)
WBC # FLD AUTO: 8.6 K/UL — SIGNIFICANT CHANGE UP (ref 3.8–10.5)

## 2019-01-17 PROCEDURE — 92960 CARDIOVERSION ELECTRIC EXT: CPT

## 2019-01-17 PROCEDURE — 99232 SBSQ HOSP IP/OBS MODERATE 35: CPT

## 2019-01-17 PROCEDURE — 93320 DOPPLER ECHO COMPLETE: CPT | Mod: 26

## 2019-01-17 PROCEDURE — 93312 ECHO TRANSESOPHAGEAL: CPT | Mod: 26

## 2019-01-17 PROCEDURE — 93010 ELECTROCARDIOGRAM REPORT: CPT

## 2019-01-17 PROCEDURE — 93325 DOPPLER ECHO COLOR FLOW MAPG: CPT | Mod: 26

## 2019-01-17 RX ORDER — DILTIAZEM HCL 120 MG
5 CAPSULE, EXT RELEASE 24 HR ORAL
Qty: 125 | Refills: 0 | Status: DISCONTINUED | OUTPATIENT
Start: 2019-01-17 | End: 2019-01-17

## 2019-01-17 RX ORDER — LEVOTHYROXINE SODIUM 125 MCG
1 TABLET ORAL
Qty: 30 | Refills: 3
Start: 2019-01-17 | End: 2019-05-16

## 2019-01-17 RX ORDER — LEVOTHYROXINE SODIUM 125 MCG
75 TABLET ORAL DAILY
Qty: 0 | Refills: 0 | Status: DISCONTINUED | OUTPATIENT
Start: 2019-01-18 | End: 2019-01-18

## 2019-01-17 RX ORDER — APIXABAN 2.5 MG/1
1 TABLET, FILM COATED ORAL
Qty: 60 | Refills: 5
Start: 2019-01-17 | End: 2019-07-15

## 2019-01-17 RX ADMIN — Medication 5 MG/HR: at 01:21

## 2019-01-17 RX ADMIN — PANTOPRAZOLE SODIUM 40 MILLIGRAM(S): 20 TABLET, DELAYED RELEASE ORAL at 06:51

## 2019-01-17 RX ADMIN — MEMANTINE HYDROCHLORIDE 10 MILLIGRAM(S): 10 TABLET ORAL at 06:53

## 2019-01-17 RX ADMIN — Medication 88 MICROGRAM(S): at 06:51

## 2019-01-17 RX ADMIN — MEMANTINE HYDROCHLORIDE 10 MILLIGRAM(S): 10 TABLET ORAL at 17:48

## 2019-01-17 RX ADMIN — HEPARIN SODIUM 800 UNIT(S)/HR: 5000 INJECTION INTRAVENOUS; SUBCUTANEOUS at 20:18

## 2019-01-17 NOTE — DIETITIAN INITIAL EVALUATION ADULT. - SIGNS/SYMPTOMS
Subjective    Chief Complaint  This information was obtained from the patient  Patient is here for a wound care follow up of bilateral foot wounds.     Allergies  NKDA    HPI  This information was obtained from the patient  The following HPI elements were d 5/14/18: Pt returns for follow up. States he may not have used as much padding on foot this week and drainage to right foot has increased again.  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     5/ BP WNL. Pulse RRR. RR within normal limits. Afebrile. Within Ideal body weight range. Alert, calm, well developed, in no apparent distress.  Height/Length: 75 in (190.5 cm), Weight: 264 lbs (120 kgs), BMI: 33, Temperature: 97.9 °F (36.61 °C), Pulse: 94 bpm, The periwound skin exhibited: Edema, Callus, Moist, Maceration. The periwound skin did not exhibit: Erythema. The temperature of the periwound skin is WNL. Periwound skin does not exhibit signs or symptoms of infection.  Local Pulse is Normal.   General Not Wound Cleansing & Dressings  Cleanse with saline or wound cleanser  Barrier ointment/paste/cream - [employer]  Collagen - [place]  Alginate  Thick Foam  Kerlix  Paper tape  Change dressing every: - every 2 days unless excess drainage then daily    Compressio AEB pt requiring CHF diet management

## 2019-01-17 NOTE — DISCHARGE NOTE ADULT - CARE PROVIDERS DIRECT ADDRESSES
,DirectAddress_Unknown,[email or web address]

## 2019-01-17 NOTE — DISCHARGE NOTE ADULT - HOSPITAL COURSE
89 yo female, PMHx mild dementia (A&O x 2, no oriented to year) Hypothyroidism, GERD, h/o Left hip fracture 2017 s/p ORIF by Dr Sharma at [facility] presented to Dr Marmolejo's office after outpt physical therapist noticed LE swelling R>L. Pt went to Dr Marmolejo (PCP's) office who found her to be in new onset afib with RVR 180s and pt was sent to [facility] for further management. Pt denies chest pain, SOB, dizziness, palpitations, nausea, orthopnea. In [facility] ED BP 94/74  RR 16 Temp 97.9F O2 sat 95% room air. BNP 5250. WBC 13. No shift. Trop neg x 1. EKG AFib 172 with TWI I, AVL. Pt given a total of Metoprolol Tartrate IV 20mg IV and 50mg PO x 1 in ED. Pt's HR still in 140s. Heparin drip stated for AC. CTA chest negative for PE, showed small b/l pleural effusions. Lasix 40mg IV x 1 given for acute congestive heart failure. CCU fellow called for bedside Echo to determine pt's EF for further Afib management. Pt being admitted to [facility] for further management of new onset afib with RVR and new onset diastolic congestive heart failure management. Pt now found to have significant RLE DVT. CTA chest negative for PE. Pt now s/p LINDA/DCCV on 1/17/19 with Dr Diana. Pt will be transitioned to Eliquis 10mg BID x 7 days and then Eliquis 5mg BID. Pt's Synthroid reduced from 100mcg daily to Synthroid 75mcg daily 2/2 low TSH levels, despite normal free T4 and low free T3 levels. Pt stable for discharge and will follow up with Dr Cummings on ..... 89 yo female, PMHx mild dementia (A&O x 2, no oriented to year) Hypothyroidism, GERD, h/o Left hip fracture 2017 s/p ORIF by Dr Sharma at [facility] presented to Dr Marmolejo's office after outpt physical therapist noticed LE swelling R>L. Pt went to Dr Marmolejo (PCP's) office who found her to be in new onset afib with RVR 180s and pt was sent to [facility] for further management. Pt denies chest pain, SOB, dizziness, palpitations, nausea, orthopnea. In [facility] ED BP 94/74  RR 16 Temp 97.9F O2 sat 95% room air. BNP 5250. WBC 13. No shift. Trop neg x 1. EKG AFib 172 with TWI I, AVL. Pt given a total of Metoprolol Tartrate IV 20mg IV and 50mg PO x 1 in ED. Pt's HR still in 140s. Heparin drip stated for AC. CTA chest negative for PE, showed small b/l pleural effusions. Lasix 40mg IV x 1 given for acute congestive heart failure. CCU fellow called for bedside Echo to determine pt's EF for further Afib management. Pt being admitted to [facility] for further management of new onset afib with RVR and new onset diastolic congestive heart failure management. Pt now found to have significant RLE DVT. CTA chest negative for PE. Pt now s/p LINDA/DCCV on 1/17/19 with Dr Diana. Pt will be transitioned to Eliquis 10mg BID x 7 days and then Eliquis 5mg BID. Pt's Synthroid reduced from 100mcg daily to Synthroid 75mcg daily 2/2 low TSH levels, despite normal free T4 and low free T3 levels. Pt stable for discharge and will follow up with Dr Cummings on Thursday 1/24/19 at 3 pm 89 yo female, PMHx mild dementia (A&O x 2, no oriented to year) Hypothyroidism, GERD, h/o Left hip fracture 2017 s/p ORIF by Dr Sharma at [facility] presented to Dr Marmolejo's office after outpt physical therapist noticed LE swelling R>L. Pt went to Dr Marmolejo (PCP's) office who found her to be in new onset afib with RVR 180s and pt was sent to [facility] for further management. Pt denies chest pain, SOB, dizziness, palpitations, nausea, orthopnea. In [facility] ED BP 94/74  RR 16 Temp 97.9F O2 sat 95% room air. BNP 5250. WBC 13. No shift. Trop neg x 1. EKG AFib 172 with TWI I, AVL. Pt given a total of Metoprolol Tartrate IV 20mg IV and 50mg PO x 1 in ED. Pt's HR still in 140s. Heparin drip stated for AC. CTA chest negative for PE, showed small b/l pleural effusions. Lasix 40mg IV x 1 given for acute congestive heart failure. CCU fellow called for bedside Echo to determine pt's EF for further Afib management. Pt being admitted to [facility] for further management of new onset afib with RVR and new onset diastolic congestive heart failure management. Pt now found to have significant RLE DVT. CTA chest negative for PE. Pt now s/p LINDA/DCCV on 1/17/19 with Dr Diana. Pt will be transitioned to Eliquis 10mg BID x 7 days and then Eliquis 5mg BID. Pt's Synthroid reduced from 100mcg daily to Synthroid 75mcg daily 2/2 low TSH levels, despite normal free T4 and low free T3 levels. Pt stable for discharge and will follow up with Dr Cummings on Thursday 1/24/19 at 3 pm. Spoke with patients insurance [employer] multiple times who would not process request due to quantity of pills and reported there is a starter pack available with ~ 70 pills. Pharmacy attempted to process Starter Pack through insurance and it was still rejected and starter packs are not readily available they would have to be ordered. Spoke with Pharmacist Pito at length who will process prescription for 1 month supply and patient will contact Pharmacy 1 week prior to pills being completed to prevent interruption in therapy. Patient with short runs of SVT up to 10 beats asymptomatic. Labs reviewed. Hypomagnesemia Magnesium 1.8-Mag Ox 800 mg Po x 1 given. Hypokalemia K+3.8-Kdur 40 mEq PO x 1 given. 89 yo female, PMHx mild dementia (A&O x 2, no oriented to year) Hypothyroidism, GERD, h/o Left hip fracture 2017 s/p ORIF by Dr Sharma at [facility] presented to Dr Marmolejo's office after outpt physical therapist noticed LE swelling R>L. Pt went to Dr Marmolejo (PCP's) office who found her to be in new onset afib with RVR 180s and pt was sent to [facility] for further management. Pt denies chest pain, SOB, dizziness, palpitations, nausea, orthopnea. In [facility] ED BP 94/74  RR 16 Temp 97.9F O2 sat 95% room air. BNP 5250. WBC 13. No shift. Trop neg x 1. EKG AFib 172 with TWI I, AVL. Pt given a total of Metoprolol Tartrate IV 20mg IV and 50mg PO x 1 in ED. Pt's HR still in 140s. Heparin drip stated for AC. CTA chest negative for PE, showed small b/l pleural effusions. Lasix 40mg IV x 1 given for acute congestive heart failure. CCU fellow called for bedside Echo to determine pt's EF for further Afib management. Pt being admitted to [facility] for further management of new onset afib with RVR and new onset diastolic congestive heart failure management. Pt now found to have significant RLE DVT. CTA chest negative for PE. Pt now s/p LINDA/DCCV on 1/17/19 with Dr Diana. Pt will be transitioned to Eliquis 10mg BID x 7 days and then Eliquis 5mg BID. Pt's Synthroid reduced from 100mcg daily to Synthroid 75mcg daily 2/2 low TSH levels, despite normal free T4 and low free T3 levels. Pt stable for discharge and will follow up with Dr Cummings on Thursday 1/24/19 at 3 pm. Spoke with patients insurance [employer] multiple times who would not process request due to quantity of pills and reported there is a starter pack available with ~ 70 pills. Pharmacy attempted to process Starter Pack through insurance and it was still rejected and starter packs are not readily available they would have to be ordered. Spoke with Pharmacist Pito at length who will process prescription for 1 month supply and patient will contact Pharmacy 1 week prior to pills being completed to prevent interruption in therapy. Patient with short runs of SVT up to 10 beats asymptomatic. Labs reviewed. Hypomagnesemia Magnesium 1.8-Mag Ox 800 mg Po x 1 given. Hypokalemia K+3.8-Kdur 40 mEq PO x 1 given. Toprol XL 12.5 mg PO Daily E-Prescribed to Pharmacy.  Patient HD stable and cleared for discharge by Dr. Cummings    V/S:	BP: 130-150's/60s		HR: 60s	RR: 18	Temp: 97.1 F  Monitor-NSR 			O2 sat- 92-93%  	  GENERAL:  Sitting in bed in no acute distress  CVS: + S1 S2. RRR. No murmurs, rubs or gallops.   Pulm: CTA Bilaterally. No rhonchi, wheezes, or rales.  Abd: BS x 4. Soft NT/ND.  	Ext: No clubbing, cyanosis, or calf tenderness BLE. +Edema BLE. 89 yo female, PMHx mild dementia (A&O x 2, no oriented to year) Hypothyroidism, GERD, h/o Left hip fracture 2017 s/p ORIF by Dr Sharma at [facility] presented to Dr Marmolejo's office after outpt physical therapist noticed LE swelling R>L. Pt went to Dr Marmolejo (PCP's) office who found her to be in new onset afib with RVR 180s and pt was sent to [facility] for further management. Pt denies chest pain, SOB, dizziness, palpitations, nausea, orthopnea. In [facility] ED BP 94/74  RR 16 Temp 97.9F O2 sat 95% room air. BNP 5250. WBC 13. No shift. Trop neg x 1. EKG AFib 172 with TWI I, AVL. Pt given a total of Metoprolol Tartrate IV 20mg IV and 50mg PO x 1 in ED. Pt's HR still in 140s. Heparin drip stated for AC. CTA chest negative for PE, showed small b/l pleural effusions. Lasix 40mg IV x 1 given for acute congestive heart failure. CCU fellow called for bedside Echo to determine pt's EF for further Afib management. Pt being admitted to [facility] for further management of new onset afib with RVR and new onset diastolic congestive heart failure management. Pt now found to have significant RLE DVT. CTA chest negative for PE. Pt now s/p LINDA/DCCV on 1/17/19 with Dr Diana. Pt will be transitioned to Eliquis 10mg BID x 7 days and then Eliquis 5mg BID. Pt's Synthroid reduced from 100mcg daily to Synthroid 75mcg daily 2/2 low TSH levels, despite normal free T4 and low free T3 levels. Pt stable for discharge and will follow up with Dr Cummings on Thursday 1/24/19 at 3 pm. Spoke with patients insurance [employer] multiple times who would not process request due to quantity of pills and reported there is a starter pack available with ~ 70 pills. Pharmacy attempted to process Starter Pack through insurance and it was still rejected and starter packs are not readily available they would have to be ordered. Spoke with Pharmacist Pito at length who will process prescription for 1 month supply and patient will contact Pharmacy 1 week prior to pills being completed to prevent interruption in therapy. Patient with short runs of SVT up to 10 beats asymptomatic. Labs reviewed. Hypomagnesemia Magnesium 1.8-Mag Ox 800 mg Po x 1 given. Hypokalemia K+3.8-Kdur 40 mEq PO x 1 given. Toprol XL 12.5 mg PO Daily E-Prescribed to Pharmacy.  Patient HD stable and cleared for discharge by Dr. Cummings    V/S:	BP: 130-150's/60s		HR: 60s	RR: 18	Temp: 97.1 F  Monitor-NSR 			O2 sat- 92-93%  	  GENERAL:  Sitting in bed in no acute distress  CVS: + S1 S2. RRR. No murmurs, rubs or gallops.   Pulm: CTA Bilaterally. No rhonchi, wheezes, or rales.  Abd: BS x 4. Soft NT/ND.  	Ext: No clubbing, cyanosis, or calf tenderness BLE. +BLE Edema R>>L.. LLE 1+. RLE 2+

## 2019-01-17 NOTE — PROGRESS NOTE ADULT - PROBLEM SELECTOR PROBLEM 2
Acute deep vein thrombosis (DVT) of popliteal vein of right lower extremity
Acute diastolic congestive heart failure
Acute diastolic congestive heart failure

## 2019-01-17 NOTE — DIETITIAN INITIAL EVALUATION ADULT. - PROBLEM SELECTOR PLAN 5
- WBC 13 with on shift on admission. Pt afebrile, repeat in AM  - CTA chest PE protocol shows no signs of PNA  - UA/UCX ordered  - RVP performed, f/u results    Pt has old CONTACT Isolation order from 12/31/16 for MDR M Morgenii in urine. No need to continue pt on contact as per Epidemiology. They will remove the order from [place] on 1/16/19 AM  Code status: FULL CODE  Dispo: pt has 24 hour home care and daughter states she will go home no matter what the recommendations are. PT consult is ordered to assess current needs.

## 2019-01-17 NOTE — DISCHARGE NOTE ADULT - PLAN OF CARE
You had a rapid, irregular heart rhythm called ATRIAL FIBRILLATION. You had a procedure called a CARDIOVERSION to shock your heart into a normal heart rhythm again. Take ELIQUIS (APIXABAN) 10mg (TWO 5 mg tablets) twice a day for 7 days, then continue with Eliquis 5mg twice a day. This is a blood thinner to break up the clot in your leg and help avoid a blood clot forming in your lungs. You have a blood clot in your right leg. Your SYNTHROID (LEVOTHYROXINE) level is too high. STOP taking SYNTHROID (LEVOTHYROXINE) 100mcg daily. START taking SYNTHROID (LEVOTHYROXINE) 75mcg daily. CONTINUE taking DEXILANT 60mg daily. Your heart was stressed out when you were in atrial fibrillation and caused water to accumulate in your legs and in your lungs. You required a diuretic called LASIX (FUROSEMIDE) when your heart was beating very fast. Now you have a good fluid status and do not require LASIX (FUROSEMIDE) on a consistent basis. You were started on Toprol XL 12.5 mg By Mouth Once Daily (1/2 tab of 25 mg daily).

## 2019-01-17 NOTE — DISCHARGE NOTE ADULT - SECONDARY DIAGNOSIS.
DVT (deep venous thrombosis) Hypothyroidism GERD (gastroesophageal reflux disease) Acute diastolic CHF (congestive heart failure) SVT (supraventricular tachycardia)

## 2019-01-17 NOTE — DIETITIAN INITIAL EVALUATION ADULT. - PROBLEM SELECTOR PLAN 2
- LE edema b/l R>L. Lungs decreased breath sounds b/l bases  - BNP 5250  - CTA chest PE protocol showed small b/l pleural effusions but no PE  - CCU fellow called for bedside Echo to determine EF showed probable normal EF. F/u official Echo in AM  - Lasix 40mg IV x 1 given.   - Pt hypotensive on arrival to 90s/60s 2/2 afib with RVR. BP 120s/80s with improvement in heart rate. Avoid ACE/ARB until HR better controlled 2/2 concern for hypotension.

## 2019-01-17 NOTE — PROGRESS NOTE ADULT - PROBLEM SELECTOR PLAN 6
- WBC 13 with on shift on admission. Pt afebrile, repeat WBC on 9.  - CTA chest PE protocol shows no signs of PNA  - UA showed moderate leukocytes and negative nitrites. UCX pending.   - RVP negative.    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 No need to continue pt on contact as per Epidemiology. They will remove the order from [place] on 1/16/19 AM  Code status: FULL CODE  Dispo: pt has 24 hour home care and PT recommended home with 24 hour aids and continued home outpt physical therapy.

## 2019-01-17 NOTE — DISCHARGE NOTE ADULT - CARE PROVIDER_API CALL
Larry Marmolejo), Internal Medicine  [address] [place] [address]  Phone: [phone]  Fax: [phone]    Gomez Cummings (MD), Cardiovascular Disease; Internal Medicine  Cardiology [facility]  [address] [address]  Phone: [phone]  Fax: [phone]    Kristie Diana), Cardiac Electrophysiology; Cardiovascular Disease; Internal Medicine  [address] [place] [address]  Phone: [phone]  Fax: [phone]

## 2019-01-17 NOTE — PROGRESS NOTE ADULT - PROBLEM SELECTOR PLAN 2
- LE edema b/l R>L improving in left LE, RLE still edematous. Lungs CTA b/l, no JVD.  - BNP 5250 on admission  - Lasix 40mg IV given on 1/15/19 PM and Lasix 40mg IV 1/16/19 AM. Pt's lungs CTA b/l, no JVD, improving LE edema. Pt was hypotensive on 1/16/19 AM and NS 250cc bolus given. Lasix currently on hold.   - Pt intermittently hypotensive during admission. Avoid ACE/ARB until HR better controlled 2/2 concern for hypotension. - LE edema b/l R>L improving in left LE, RLE still edematous likely 2/2 DVT. Lungs CTA b/l, no JVD.  - BNP 5250 on admission  - Lasix 40mg IV given on 1/15/19 PM and Lasix 40mg IV 1/16/19 AM. Pt's lungs CTA b/l, no JVD, improving LE edema. Pt was hypotensive on 1/16/19 AM and NS 250cc bolus given. Lasix currently on hold.   - Pt intermittently hypotensive during admission. Avoid ACE/ARB until HR better controlled 2/2 concern for hypotension.

## 2019-01-17 NOTE — DIETITIAN INITIAL EVALUATION ADULT. - OTHER INFO
73 y.o F from home with PMHx of mild dementia, hypothyroidsm, GERD. h/o L Hip fx 2017 s/p ORIF. Pt admitted with new onset Afib with RVR and new onset CHF management.  cooks at home and is the caregiver of pt, reports good appetite, dislike salty foods,  lbs, NKFA. EP to be f/u for possible LINDA/Cardioversion. Currently tolerating DASH/TLC diet, FR 1000 mL/d with fair 50-74% PO intake of meals. Endorses good appetite. Food prefernces obtained from pt for dinner meal today. Denies N/V/D/C or pain. +BM 1/14. Skin intact. Nutrition edu provided to pt and family on CHF diet therapy. RD will f/u per protocol.

## 2019-01-17 NOTE — PROGRESS NOTE ADULT - PROBLEM SELECTOR PLAN 4
- Pt on Synthroid 100mcg daily as outpt. Dr Marmolejo wants to decrease Synthroid to 75mg daily.    - TSH 0.2. Free T3 mildly low and Free T4 normal.

## 2019-01-17 NOTE — DIETITIAN INITIAL EVALUATION ADULT. - ENERGY NEEDS
Height: 5'2" Weight: 159.3 lbs (1/17), 157.8 lbs (1/16),  lbs+/-10%, %%, BMI 29.2,   IBW used to calculate EER as per pt's current body weight >120% of IBW   Estimated nutrient needs based on [facility] SOC for maintenance in older adults.  FR 1000 mL/d per team

## 2019-01-17 NOTE — DISCHARGE NOTE ADULT - ADDITIONAL INSTRUCTIONS
FOLLOW UP with Dr Cummings on .....   FOLLOW up with Dr Diana (EP doctor) in 1 month.   FOLLOW UP with Dr Marmolejo in 1 week. FOLLOW UP with Dr Cummings on Thursday 1/24/19 at 3 PM  FOLLOW up with Dr Diana (EP doctor) in 1 month.   FOLLOW UP with Dr Marmolejo in 1 week.

## 2019-01-17 NOTE — PROGRESS NOTE ADULT - PROBLEM SELECTOR PLAN 3
Low TSH, dose reduced to 88mcg
- LE duplex showed substantial right lower leg DVT, no DVT in left leg  - CTA chest PE protocol negative for DVT but showed 9mm subpleural left lower lobe.   - Discuss further potential malignancy workup with Dr Marmolejo as per Dr Cummings
- LE duplex showed substantial right lower leg DVT, no DVT in left leg  - CTA chest PE protocol negative for DVT but showed 9mm subpleural left lower lobe.   - Discuss further potential malignancy workup with Dr Marmolejo as per Dr Cummings

## 2019-01-17 NOTE — DISCHARGE NOTE ADULT - PATIENT PORTAL LINK FT
You can access the [facility] Portal, offered by [facility], by registering with the following website: [email or web address]

## 2019-01-17 NOTE — DISCHARGE NOTE ADULT - CARE PLAN
Principal Discharge DX:	Atrial fibrillation with RVR  Goal:	You had a rapid, irregular heart rhythm called ATRIAL FIBRILLATION. You had a procedure called a CARDIOVERSION to shock your heart into a normal heart rhythm again.  Assessment and plan of treatment:	Take ELIQUIS (APIXABAN) 10mg (TWO 5 mg tablets) twice a day for 7 days, then continue with Eliquis 5mg twice a day. This is a blood thinner to break up the clot in your leg and help avoid a blood clot forming in your lungs.  Secondary Diagnosis:	DVT (deep venous thrombosis)  Goal:	You have a blood clot in your right leg.  Assessment and plan of treatment:	Take ELIQUIS (APIXABAN) 10mg (TWO 5 mg tablets) twice a day for 7 days, then continue with Eliquis 5mg twice a day. This is a blood thinner to break up the clot in your leg and help avoid a blood clot forming in your lungs.  Secondary Diagnosis:	Hypothyroidism  Goal:	Your SYNTHROID (LEVOTHYROXINE) level is too high. STOP taking SYNTHROID (LEVOTHYROXINE) 100mcg daily. START taking SYNTHROID (LEVOTHYROXINE) 75mcg daily.  Secondary Diagnosis:	GERD (gastroesophageal reflux disease)  Goal:	CONTINUE taking DEXILANT 60mg daily. Principal Discharge DX:	Atrial fibrillation with RVR  Goal:	You had a rapid, irregular heart rhythm called ATRIAL FIBRILLATION. You had a procedure called a CARDIOVERSION to shock your heart into a normal heart rhythm again.  Assessment and plan of treatment:	Take ELIQUIS (APIXABAN) 10mg (TWO 5 mg tablets) twice a day for 7 days, then continue with Eliquis 5mg twice a day. This is a blood thinner to break up the clot in your leg and help avoid a blood clot forming in your lungs.  Secondary Diagnosis:	DVT (deep venous thrombosis)  Goal:	You have a blood clot in your right leg.  Assessment and plan of treatment:	Take ELIQUIS (APIXABAN) 10mg (TWO 5 mg tablets) twice a day for 7 days, then continue with Eliquis 5mg twice a day. This is a blood thinner to break up the clot in your leg and help avoid a blood clot forming in your lungs.  Secondary Diagnosis:	Hypothyroidism  Goal:	Your SYNTHROID (LEVOTHYROXINE) level is too high. STOP taking SYNTHROID (LEVOTHYROXINE) 100mcg daily. START taking SYNTHROID (LEVOTHYROXINE) 75mcg daily.  Secondary Diagnosis:	GERD (gastroesophageal reflux disease)  Goal:	CONTINUE taking DEXILANT 60mg daily.  Secondary Diagnosis:	Acute diastolic CHF (congestive heart failure)  Goal:	Your heart was stressed out when you were in atrial fibrillation and caused water to accumulate in your legs and in your lungs. You required a diuretic called LASIX (FUROSEMIDE) when your heart was beating very fast. Now you have a good fluid status and do not require LASIX (FUROSEMIDE) on a consistent basis. Principal Discharge DX:	Atrial fibrillation with RVR  Goal:	You had a rapid, irregular heart rhythm called ATRIAL FIBRILLATION. You had a procedure called a CARDIOVERSION to shock your heart into a normal heart rhythm again.  Assessment and plan of treatment:	Take ELIQUIS (APIXABAN) 10mg (TWO 5 mg tablets) twice a day for 7 days, then continue with Eliquis 5mg twice a day. This is a blood thinner to break up the clot in your leg and help avoid a blood clot forming in your lungs.  Secondary Diagnosis:	DVT (deep venous thrombosis)  Goal:	You have a blood clot in your right leg.  Assessment and plan of treatment:	Take ELIQUIS (APIXABAN) 10mg (TWO 5 mg tablets) twice a day for 7 days, then continue with Eliquis 5mg twice a day. This is a blood thinner to break up the clot in your leg and help avoid a blood clot forming in your lungs.  Secondary Diagnosis:	Hypothyroidism  Goal:	Your SYNTHROID (LEVOTHYROXINE) level is too high. STOP taking SYNTHROID (LEVOTHYROXINE) 100mcg daily. START taking SYNTHROID (LEVOTHYROXINE) 75mcg daily.  Secondary Diagnosis:	GERD (gastroesophageal reflux disease)  Goal:	CONTINUE taking DEXILANT 60mg daily.  Secondary Diagnosis:	Acute diastolic CHF (congestive heart failure)  Goal:	Your heart was stressed out when you were in atrial fibrillation and caused water to accumulate in your legs and in your lungs. You required a diuretic called LASIX (FUROSEMIDE) when your heart was beating very fast. Now you have a good fluid status and do not require LASIX (FUROSEMIDE) on a consistent basis.  Secondary Diagnosis:	SVT (supraventricular tachycardia)  Assessment and plan of treatment:	You were started on Toprol XL 12.5 mg By Mouth Once Daily (1/2 tab of 25 mg daily).

## 2019-01-17 NOTE — DIETITIAN INITIAL EVALUATION ADULT. - PROBLEM SELECTOR PLAN 1
- currently asymptomatic  - new onset afib with RVR to 180s on arrival, reduced to 140s with Lopressor 20mg IV and 50mg PO x 1.   - CCU fellow performed beside Echo showing probable normal EF. Cardizem 10mg IV x 1 given, f/u formal Echo on 1/16/19  - heparin drip started for AC

## 2019-01-17 NOTE — DISCHARGE NOTE ADULT - MEDICATION SUMMARY - MEDICATIONS TO TAKE
I will START or STAY ON the medications listed below when I get home from the hospital:    apixaban 5 mg oral tablet  -- Take ELIQUIS 10mg (two 5mg tabs) twice a day for 7 days, then ELIQUIS 5mg (one tab) twice a day  -- Check with your doctor before becoming pregnant.  It is very important that you take or use this exactly as directed.  Do not skip doses or discontinue unless directed by your doctor.  Obtain medical advice before taking any non-prescription drugs as some may affect the action of this medication.    -- Indication: For Blood thinner for blood clot in leg (DVT) and Atrial fibrillation    Namenda 10 mg oral tablet  -- 1 tab(s) by mouth 2 times a day  -- Indication: For Memory    Dexilant 60 mg oral delayed release capsule  -- 1 cap(s) by mouth once a day  -- Indication: For Stomach acid control    levothyroxine 75 mcg (0.075 mg) oral tablet  -- 1 tab(s) by mouth once a day   -- It is very important that you take or use this exactly as directed.  Do not skip doses or discontinue unless directed by your doctor.  Medication should be taken with plenty of water.  Some non-prescription drugs may aggravate your condition.  Read all labels carefully.  If a warning appears, check with your doctor before taking.  Take medication on an empty stomach 1 hour before or 2 to 3 hours after a meal unless otherwise directed by your doctor.    -- Indication: For Thyroid disease I will START or STAY ON the medications listed below when I get home from the hospital:    apixaban 5 mg oral tablet  -- Take ELIQUIS 10mg (two 5mg tabs) twice a day for 7 days, then ELIQUIS 5mg (one tab) twice a day  -- Check with your doctor before becoming pregnant.  It is very important that you take or use this exactly as directed.  Do not skip doses or discontinue unless directed by your doctor.  Obtain medical advice before taking any non-prescription drugs as some may affect the action of this medication.    -- Indication: For Blood thinner for blood clot in leg (DVT) and Atrial fibrillation    Toprol-XL 25 mg oral tablet, extended release  -- 0.5 tab(s) by mouth once a day  -- Indication: For Heart Rate Control, Supraventricular Tachycardia    Namenda 10 mg oral tablet  -- 1 tab(s) by mouth 2 times a day  -- Indication: For Memory    Dexilant 60 mg oral delayed release capsule  -- 1 cap(s) by mouth once a day  -- Indication: For Stomach acid control    levothyroxine 75 mcg (0.075 mg) oral tablet  -- 1 tab(s) by mouth once a day   -- It is very important that you take or use this exactly as directed.  Do not skip doses or discontinue unless directed by your doctor.  Medication should be taken with plenty of water.  Some non-prescription drugs may aggravate your condition.  Read all labels carefully.  If a warning appears, check with your doctor before taking.  Take medication on an empty stomach 1 hour before or 2 to 3 hours after a meal unless otherwise directed by your doctor.    -- Indication: For Thyroid disease

## 2019-01-17 NOTE — PROGRESS NOTE ADULT - PROBLEM SELECTOR PLAN 1
- currently asymptomatic  - new onset afib with RVR to 180s on arrival, reduced to 140s with Lopressor 20mg IV and 50mg PO x 1. Gave Cardizem 10mg IV X 1 on 1/16/19. Cardizem 30mg BID ordered with holding parameters of SBP <110 and/or HR <55. Pt hypotensive on 1/16/19 AM to SBP 80s, increased to SBP 110s after 250cc bolus.   - EP consulted as pt is difficult to rate control 2/2 hypotension.   - Pt has significant DVT but ruled out for PE via CTA chest PE protocol. Pt also has hyperthyroidism and Synthroid dose is being reduced. Both could contribute to her being in new onset afib with RVR.  - NPO after midnight for LINDA/DCCV in AM after discussing with Dr Cummings.  - Echo 1/16/19 Moderate concentric LVH. Normal LV wall motion. LA moderately dilated. RA/RV normal. Mild to mod AR/MR/TR. Mild pulm HTN. PASP 41mmHg. No pericardial effusion.   - heparin drip started for AC, will switch to Eliquis 10mg BID x 7 days and then to Eliquis 5mg BID.
can increase Cardizem for better rate control, I favor a LINDA and DC CV
- currently asymptomatic  - new onset afib with RVR to 180s on arrival, reduced to 140s with Lopressor 20mg IV and 50mg PO x 1. Gave Cardizem 10mg IV X 1 on 1/16/19. Cardizem 30mg BID ordered with holding parameters of SBP <110 and/or HR <55. Pt hypotensive on 1/16/19 AM to SBP 80s, increased to SBP 110s after 250cc bolus.   - EP consulted as pt is difficult to rate control 2/2 hypotension.   - Pt now s/p successful LINDA/DCCV on 1/17/19 and is now in NSR (small P waves) with APCs.   - No standing Cardizem as pt's HR in 60s. Restart if pt goes back into Afib.   - heparin drip started for AC, on 1/18/19 AM will switch to Eliquis 10mg BID x 7 days and then to Eliquis 5mg BID.  - Pt has significant DVT but ruled out for PE via CTA chest PE protocol. Pt also has hyperthyroidism and Synthroid dose is being reduced. Both could contribute to her being in new onset afib with RVR.  - Echo 1/16/19 Moderate concentric LVH. Normal LV wall motion. LA moderately dilated. RA/RV normal. Mild to mod AR/MR/TR. Mild pulm HTN. PASP 41mmHg. No pericardial effusion.

## 2019-01-18 VITALS
DIASTOLIC BLOOD PRESSURE: 71 MMHG | HEART RATE: 75 BPM | OXYGEN SATURATION: 92 % | RESPIRATION RATE: 17 BRPM | SYSTOLIC BLOOD PRESSURE: 156 MMHG

## 2019-01-18 PROBLEM — K21.9 GASTRO-ESOPHAGEAL REFLUX DISEASE WITHOUT ESOPHAGITIS: Chronic | Status: ACTIVE | Noted: 2019-01-15

## 2019-01-18 LAB
ALBUMIN SERPL ELPH-MCNC: 3.1 G/DL — LOW (ref 3.3–5)
ALP SERPL-CCNC: 91 U/L — SIGNIFICANT CHANGE UP (ref 40–120)
ALT FLD-CCNC: 9 U/L — LOW (ref 10–45)
ANION GAP SERPL CALC-SCNC: 14 MMOL/L — SIGNIFICANT CHANGE UP (ref 5–17)
APTT BLD: 93.6 SEC — HIGH (ref 27.5–36.3)
AST SERPL-CCNC: 13 U/L — SIGNIFICANT CHANGE UP (ref 10–40)
BILIRUB SERPL-MCNC: 0.6 MG/DL — SIGNIFICANT CHANGE UP (ref 0.2–1.2)
BUN SERPL-MCNC: 22 MG/DL — SIGNIFICANT CHANGE UP (ref 7–23)
CALCIUM SERPL-MCNC: 9.8 MG/DL — SIGNIFICANT CHANGE UP (ref 8.4–10.5)
CHLORIDE SERPL-SCNC: 104 MMOL/L — SIGNIFICANT CHANGE UP (ref 96–108)
CO2 SERPL-SCNC: 25 MMOL/L — SIGNIFICANT CHANGE UP (ref 22–31)
CREAT SERPL-MCNC: 0.76 MG/DL — SIGNIFICANT CHANGE UP (ref 0.5–1.3)
GLUCOSE SERPL-MCNC: 97 MG/DL — SIGNIFICANT CHANGE UP (ref 70–99)
HCT VFR BLD CALC: 38.2 % — SIGNIFICANT CHANGE UP (ref 34.5–45)
HGB BLD-MCNC: 11.9 G/DL — SIGNIFICANT CHANGE UP (ref 11.5–15.5)
MAGNESIUM SERPL-MCNC: 1.9 MG/DL — SIGNIFICANT CHANGE UP (ref 1.6–2.6)
MCHC RBC-ENTMCNC: 31.2 G/DL — LOW (ref 32–36)
MCHC RBC-ENTMCNC: 32.1 PG — SIGNIFICANT CHANGE UP (ref 27–34)
MCV RBC AUTO: 103 FL — HIGH (ref 80–100)
PLATELET # BLD AUTO: 134 K/UL — LOW (ref 150–400)
POTASSIUM SERPL-MCNC: 3.8 MMOL/L — SIGNIFICANT CHANGE UP (ref 3.5–5.3)
POTASSIUM SERPL-SCNC: 3.8 MMOL/L — SIGNIFICANT CHANGE UP (ref 3.5–5.3)
PROT SERPL-MCNC: 5.6 G/DL — LOW (ref 6–8.3)
RBC # BLD: 3.71 M/UL — LOW (ref 3.8–5.2)
RBC # FLD: 14.1 % — SIGNIFICANT CHANGE UP (ref 10.3–16.9)
SODIUM SERPL-SCNC: 143 MMOL/L — SIGNIFICANT CHANGE UP (ref 135–145)
WBC # BLD: 8.9 K/UL — SIGNIFICANT CHANGE UP (ref 3.8–10.5)
WBC # FLD AUTO: 8.9 K/UL — SIGNIFICANT CHANGE UP (ref 3.8–10.5)

## 2019-01-18 PROCEDURE — 87633 RESP VIRUS 12-25 TARGETS: CPT

## 2019-01-18 PROCEDURE — 96374 THER/PROPH/DIAG INJ IV PUSH: CPT | Mod: XU

## 2019-01-18 PROCEDURE — 84481 FREE ASSAY (FT-3): CPT

## 2019-01-18 PROCEDURE — 80053 COMPREHEN METABOLIC PANEL: CPT

## 2019-01-18 PROCEDURE — 84484 ASSAY OF TROPONIN QUANT: CPT

## 2019-01-18 PROCEDURE — 93312 ECHO TRANSESOPHAGEAL: CPT

## 2019-01-18 PROCEDURE — 93005 ELECTROCARDIOGRAM TRACING: CPT

## 2019-01-18 PROCEDURE — 84443 ASSAY THYROID STIM HORMONE: CPT

## 2019-01-18 PROCEDURE — 85730 THROMBOPLASTIN TIME PARTIAL: CPT

## 2019-01-18 PROCEDURE — 93306 TTE W/DOPPLER COMPLETE: CPT

## 2019-01-18 PROCEDURE — 82550 ASSAY OF CK (CPK): CPT

## 2019-01-18 PROCEDURE — 71275 CT ANGIOGRAPHY CHEST: CPT

## 2019-01-18 PROCEDURE — 96376 TX/PRO/DX INJ SAME DRUG ADON: CPT

## 2019-01-18 PROCEDURE — 81001 URINALYSIS AUTO W/SCOPE: CPT

## 2019-01-18 PROCEDURE — 93970 EXTREMITY STUDY: CPT

## 2019-01-18 PROCEDURE — 86850 RBC ANTIBODY SCREEN: CPT

## 2019-01-18 PROCEDURE — 84439 ASSAY OF FREE THYROXINE: CPT

## 2019-01-18 PROCEDURE — 96375 TX/PRO/DX INJ NEW DRUG ADDON: CPT | Mod: XU

## 2019-01-18 PROCEDURE — 87486 CHLMYD PNEUM DNA AMP PROBE: CPT

## 2019-01-18 PROCEDURE — 86901 BLOOD TYPING SEROLOGIC RH(D): CPT

## 2019-01-18 PROCEDURE — 99238 HOSP IP/OBS DSCHRG MGMT 30/<: CPT

## 2019-01-18 PROCEDURE — 83880 ASSAY OF NATRIURETIC PEPTIDE: CPT

## 2019-01-18 PROCEDURE — 83735 ASSAY OF MAGNESIUM: CPT

## 2019-01-18 PROCEDURE — 87086 URINE CULTURE/COLONY COUNT: CPT

## 2019-01-18 PROCEDURE — 97161 PT EVAL LOW COMPLEX 20 MIN: CPT

## 2019-01-18 PROCEDURE — 87798 DETECT AGENT NOS DNA AMP: CPT

## 2019-01-18 PROCEDURE — 36415 COLL VENOUS BLD VENIPUNCTURE: CPT

## 2019-01-18 PROCEDURE — 85027 COMPLETE CBC AUTOMATED: CPT

## 2019-01-18 PROCEDURE — 86900 BLOOD TYPING SEROLOGIC ABO: CPT

## 2019-01-18 PROCEDURE — 87581 M.PNEUMON DNA AMP PROBE: CPT

## 2019-01-18 PROCEDURE — 85025 COMPLETE CBC W/AUTO DIFF WBC: CPT

## 2019-01-18 PROCEDURE — 85610 PROTHROMBIN TIME: CPT

## 2019-01-18 PROCEDURE — 99291 CRITICAL CARE FIRST HOUR: CPT | Mod: 25

## 2019-01-18 PROCEDURE — 82553 CREATINE MB FRACTION: CPT

## 2019-01-18 RX ORDER — MAGNESIUM OXIDE 400 MG ORAL TABLET 241.3 MG
800 TABLET ORAL ONCE
Qty: 0 | Refills: 0 | Status: COMPLETED | OUTPATIENT
Start: 2019-01-18 | End: 2019-01-18

## 2019-01-18 RX ORDER — APIXABAN 2.5 MG/1
10 TABLET, FILM COATED ORAL ONCE
Qty: 0 | Refills: 0 | Status: COMPLETED | OUTPATIENT
Start: 2019-01-18 | End: 2019-01-18

## 2019-01-18 RX ORDER — METOPROLOL TARTRATE 50 MG
0.5 TABLET ORAL
Qty: 15 | Refills: 1
Start: 2019-01-18 | End: 2019-03-18

## 2019-01-18 RX ORDER — POTASSIUM CHLORIDE 20 MEQ
40 PACKET (EA) ORAL ONCE
Qty: 0 | Refills: 0 | Status: COMPLETED | OUTPATIENT
Start: 2019-01-18 | End: 2019-01-18

## 2019-01-18 RX ADMIN — PANTOPRAZOLE SODIUM 40 MILLIGRAM(S): 20 TABLET, DELAYED RELEASE ORAL at 05:39

## 2019-01-18 RX ADMIN — MEMANTINE HYDROCHLORIDE 10 MILLIGRAM(S): 10 TABLET ORAL at 05:39

## 2019-01-18 RX ADMIN — Medication 40 MILLIEQUIVALENT(S): at 10:16

## 2019-01-18 RX ADMIN — APIXABAN 10 MILLIGRAM(S): 2.5 TABLET, FILM COATED ORAL at 14:45

## 2019-01-18 RX ADMIN — HEPARIN SODIUM 800 UNIT(S)/HR: 5000 INJECTION INTRAVENOUS; SUBCUTANEOUS at 07:27

## 2019-01-18 RX ADMIN — MAGNESIUM OXIDE 400 MG ORAL TABLET 800 MILLIGRAM(S): 241.3 TABLET ORAL at 10:17

## 2019-01-18 RX ADMIN — Medication 75 MICROGRAM(S): at 05:40

## 2019-01-18 NOTE — PROGRESS NOTE ADULT - REASON FOR ADMISSION
new onset afib with RVR

## 2019-01-18 NOTE — PROGRESS NOTE ADULT - SUBJECTIVE AND OBJECTIVE BOX
Interventional Cardiology PA Adult Progress Note    CC: RLE edema  Subjective Assessment:    RLE edema.    12 point ROS otherwise negative except as stated in HPI and subjective. 	  MEDICATIONS:  diltiazem    Tablet 30 milliGRAM(s) Oral two times a day  furosemide   Injectable 40 milliGRAM(s) IV Push two times a day  acetaminophen   Tablet .. 650 milliGRAM(s) Oral every 6 hours PRN  memantine 10 milliGRAM(s) Oral two times a day  pantoprazole    Tablet 40 milliGRAM(s) Oral before breakfast  heparin  Infusion. 1000 Unit(s)/Hr IV Continuous <Continuous>  heparin  Injectable 6000 Unit(s) IV Push every 6 hours PRN  heparin  Injectable 3000 Unit(s) IV Push every 6 hours PRN    [PHYSICAL EXAM:  TELEMETRY: afib with RVR  T(C): 36.6 (01-16-19 @ 13:25), Max: 36.8 (01-15-19 @ 21:35)  HR: 140 (01-16-19 @ 16:41) (75 - 143)  BP: 142/97 (01-16-19 @ 16:41) (82/56 - 143/75)  RR: 17 (01-16-19 @ 16:41) (16 - 18)  SpO2: 93% (01-16-19 @ 16:41) (91% - 97%)  Wt(kg): --  I&O's Summary    15 Joe 2019 07:01  -  16 Jan 2019 07:00  --------------------------------------------------------  IN: 112 mL / OUT: 625 mL / NET: -513 mL    16 Jan 2019 07:01  -  16 Jan 2019 16:57  --------------------------------------------------------  IN: 120 mL / OUT: 380 mL / NET: -260 mL      Height (cm): 157.48 (01-15 @ 20:38)  Becker: none  Central/PICC/Mid Line: none                                        Appearance: Normal	  HEENT:   Normal oral mucosa, PERRL, EOMI	  Neck: Supple, -JVD   Cardiovascular: Normal S1 S2, No JVD, No murmurs,   Respiratory: Lungs clear to auscultation b/l, No Rales, Rhonchi, Wheezing	  Gastrointestinal:  Soft, Non-tender, + BS	  Skin: No rashes, No ecchymoses, No cyanosis  Extremities: + 2 LE edema on left leg, +3 LE edema right leg  Vascular: Peripheral pulses palpable 2+ bilaterally  Neurologic: Non-focal  Psychiatry: A & O x 3, Mood & affect appropriate    LABS:                      12.9   9.3   )-----------( 120      ( 16 Jan 2019 06:36 )             40.2     01-16    140  |  104  |  21  ----------------------------<  102<H>  4.0   |  20<L>  |  0.80    Ca    10.2      16 Jan 2019 06:33  Mg     1.7     01-16    TPro  5.9<L>  /  Alb  3.2<L>  /  TBili  0.7  /  DBili  x   /  AST  24  /  ALT  17  /  AlkPhos  108  01-16    PT/INR - ( 15 Joe 2019 16:39 )   PT: 17.5 sec;   INR: 1.53          PTT - ( 16 Jan 2019 15:26 )  PTT:87.5 sec    ASSESSMENT/PLAN:
Awake and alert VS stable In RR with ectopy  Chest is clear  Edema unchanged
Awake and alert VS stable Remains in AF with slower VR
Comfortable this am Remains in AF with slower VR RLE edema decreased
INTERVAL HISTORY:  DVT(no PE), rapid AF  	  MEDICATIONS:  diltiazem    Tablet 30 milliGRAM(s) Oral two times a day  acetaminophen   Tablet .. 650 milliGRAM(s) Oral every 6 hours PRN  memantine 10 milliGRAM(s) Oral two times a day    pantoprazole    Tablet 40 milliGRAM(s) Oral before breakfast    levothyroxine 88 MICROGram(s) Oral daily    heparin  Infusion. 1000 Unit(s)/Hr IV Continuous <Continuous>  heparin  Injectable 6000 Unit(s) IV Push every 6 hours PRN  heparin  Injectable 3000 Unit(s) IV Push every 6 hours PRN        PHYSICAL EXAM:  T(C): 36.4 (01-17-19 @ 06:03), Max: 37.2 (01-16-19 @ 22:03)  HR: 100 (01-17-19 @ 06:17) (83 - 143)  BP: 127/69 (01-17-19 @ 06:17) (82/56 - 145/78)  RR: 16 (01-17-19 @ 06:17) (16 - 18)  SpO2: 92% (01-17-19 @ 06:17) (90% - 95%)  Wt(kg): --  I&O's Summary    16 Jan 2019 07:01  -  17 Jan 2019 07:00  --------------------------------------------------------  IN: 293.5 mL / OUT: 380 mL / NET: -86.5 mL          Appearance: Normal	  HEENT:   Normal oral mucosa, PERRL, EOMI	  Lymphatic: No lymphadenopathy  Cardiovascular: Irregular, variable S1 S2, No JVD, No murmurs,    Respiratory: Lungs clear to auscultation	  Psychiatry: A & O x 3, Mood & affect appropriate  Gastrointestinal:  Soft, Non-tender, + BS	  Skin: No rashes, No ecchymoses, No cyanosis  Neurologic: Non-focal  Extremities:   No clubbing, cyanosis, RLE markedly swollen edema  Vascular: Peripheral pulses palpable 2+ bilaterally    TELEMETRY: 	    ECG:  	  RADIOLOGY:   DIAGNOSTIC TESTING:  [ ] Echocardiogram:  [ ]  Catheterization:  [ ] Stress Test:    OTHER: 	    LABS:	 	    CARDIAC MARKERS:                                  12.5   8.6   )-----------( 150      ( 17 Jan 2019 07:28 )             39.0     01-17    144  |  105  |  22  ----------------------------<  101<H>  3.6   |  25  |  0.74    Ca    10.0      17 Jan 2019 07:28  Mg     1.9     01-17    TPro  6.0  /  Alb  3.3  /  TBili  0.6  /  DBili  x   /  AST  15  /  ALT  11  /  AlkPhos  96  01-17    proBNP:   Lipid Profile:   HgA1c:   TSH:     ASSESSMENT/PLAN:
Interventional Cardiology PA Adult Progress Note    Subjective Assessment:  	  MEDICATIONS:        acetaminophen   Tablet .. 650 milliGRAM(s) Oral every 6 hours PRN  memantine 10 milliGRAM(s) Oral two times a day    pantoprazole    Tablet 40 milliGRAM(s) Oral before breakfast      heparin  Infusion. 1000 Unit(s)/Hr IV Continuous <Continuous>  heparin  Injectable 6000 Unit(s) IV Push every 6 hours PRN  heparin  Injectable 3000 Unit(s) IV Push every 6 hours PRN      	    [PHYSICAL EXAM:  TELEMETRY:  T(C): 36.7 (01-17-19 @ 18:00), Max: 37.2 (01-16-19 @ 22:03)  HR: 69 (01-17-19 @ 17:50) (69 - 136)  BP: 140/60 (01-17-19 @ 17:50) (94/50 - 145/78)  RR: 17 (01-17-19 @ 17:50) (16 - 18)  SpO2: 98% (01-17-19 @ 17:50) (90% - 98%)  Wt(kg): --  I&O's Summary    16 Jan 2019 07:01  -  17 Jan 2019 07:00  --------------------------------------------------------  IN: 293.5 mL / OUT: 380 mL / NET: -86.5 mL    17 Jan 2019 07:01  -  17 Jan 2019 19:09  --------------------------------------------------------  IN: 0 mL / OUT: 0 mL / NET: 0 mL        Becker:  Central/PICC/Mid Line:                                         Appearance: Normal	  HEENT:   Normal oral mucosa, PERRL, EOMI	  Neck: Supple, + JVD/ - JVD; Carotid Bruit   Cardiovascular: Normal S1 S2, No JVD, No murmurs,   Respiratory: Lungs clear to auscultation/Decreased Breath Sounds/No Rales, Rhonchi, Wheezing	  Gastrointestinal:  Soft, Non-tender, + BS	  Skin: No rashes, No ecchymoses, No cyanosis  Extremities: Normal range of motion, No clubbing, cyanosis or edema  Vascular: Peripheral pulses palpable 2+ bilaterally  Neurologic: Non-focal  Psychiatry: A & O x 3, Mood & affect appropriate    LABS:                          12.5   8.6   )-----------( 150      ( 17 Jan 2019 07:28 )             39.0     01-17    144  |  105  |  22  ----------------------------<  101<H>  3.6   |  25  |  0.74    Ca    10.0      17 Jan 2019 07:28  Mg     1.9     01-17    TPro  6.0  /  Alb  3.3  /  TBili  0.6  /  DBili  x   /  AST  15  /  ALT  11  /  AlkPhos  96  01-17  PT/INR - ( 17 Jan 2019 07:28 )   PT: 17.5 sec;   INR: 1.53          PTT - ( 17 Jan 2019 07:28 )  PTT:83.3 sec    ASSESSMENT/PLAN:

## 2019-01-18 NOTE — PROGRESS NOTE ADULT - ASSESSMENT
89 yo female, PMHx mild dementia (A&O x 2, no oriented to year) Hypothyroidism, GERD, h/o Left hip fracture 2017 s/p ORIF by Dr Sharma at [facility] presented to Dr Marmolejo's office after outpt physical therapist noticed LE swelling R>L. Pt went to Dr Marmolejo (PCP's) office who found her to be in new onset afib with RVR 180s and pt was sent to [facility] for further management. Pt denies chest pain, SOB, dizziness, palpitations, nausea, orthopnea. In [facility] ED BP 94/74  RR 16 Temp 97.9F O2 sat 95% room air. BNP 5250. WBC 13. No shift. Trop neg x 1. EKG AFib 172 with TWI I, AVL. Pt given a total of Metoprolol Tartrate IV 20mg IV and 50mg PO x 1 in ED. Pt's HR still in 140s. Heparin drip stated for AC. CTA chest negative for PE, showed small b/l pleural effusions. Lasix 40mg IV x 1 given for acute congestive heart failure. CCU fellow called for bedside Echo to determine pt's EF for further Afib management. Pt being admitted to [facility] for further management of new onset afib with RVR and new onset congestive heart failure management.
89 yo female, PMHx mild dementia (A&O x 2, no oriented to year) Hypothyroidism, GERD, h/o Left hip fracture 2017 s/p ORIF by Dr Sharma at [facility] presented to Dr Marmolejo's office after outpt physical therapist noticed LE swelling R>L. Pt went to Dr Marmolejo (PCP's) office who found her to be in new onset afib with RVR 180s and pt was sent to [facility] for further management. Pt denies chest pain, SOB, dizziness, palpitations, nausea, orthopnea. In [facility] ED BP 94/74  RR 16 Temp 97.9F O2 sat 95% room air. BNP 5250. WBC 13. No shift. Trop neg x 1. EKG AFib 172 with TWI I, AVL. Pt given a total of Metoprolol Tartrate IV 20mg IV and 50mg PO x 1 in ED. Pt's HR still in 140s. Heparin drip stated for AC. CTA chest negative for PE, showed small b/l pleural effusions. Lasix 40mg IV x 1 given for acute congestive heart failure. CCU fellow called for bedside Echo to determine pt's EF for further Afib management. Pt being admitted to [facility] for further management of new onset afib with RVR and new onset congestive heart failure management.
Discussed with Dr. Cummings re rate control vs cardioversion
Stable for discharge on eliquis
Would decrease synthroid dose  Obtain RLE doppler  Will discuss with cardiology re AF and rate control

## 2019-01-31 DIAGNOSIS — F03.90 UNSPECIFIED DEMENTIA WITHOUT BEHAVIORAL DISTURBANCE: ICD-10-CM

## 2019-01-31 DIAGNOSIS — K21.9 GASTRO-ESOPHAGEAL REFLUX DISEASE WITHOUT ESOPHAGITIS: ICD-10-CM

## 2019-01-31 DIAGNOSIS — I48.91 UNSPECIFIED ATRIAL FIBRILLATION: ICD-10-CM

## 2019-01-31 DIAGNOSIS — E03.9 HYPOTHYROIDISM, UNSPECIFIED: ICD-10-CM

## 2019-01-31 DIAGNOSIS — Z96.642 PRESENCE OF LEFT ARTIFICIAL HIP JOINT: ICD-10-CM

## 2019-01-31 DIAGNOSIS — I50.31 ACUTE DIASTOLIC (CONGESTIVE) HEART FAILURE: ICD-10-CM

## 2019-01-31 DIAGNOSIS — E87.6 HYPOKALEMIA: ICD-10-CM

## 2019-01-31 DIAGNOSIS — R09.89 OTHER SPECIFIED SYMPTOMS AND SIGNS INVOLVING THE CIRCULATORY AND RESPIRATORY SYSTEMS: ICD-10-CM

## 2019-01-31 DIAGNOSIS — Z87.81 PERSONAL HISTORY OF (HEALED) TRAUMATIC FRACTURE: ICD-10-CM

## 2019-01-31 DIAGNOSIS — R07.9 CHEST PAIN, UNSPECIFIED: ICD-10-CM

## 2019-01-31 DIAGNOSIS — I82.401 ACUTE EMBOLISM AND THROMBOSIS OF UNSPECIFIED DEEP VEINS OF RIGHT LOWER EXTREMITY: ICD-10-CM

## 2019-01-31 DIAGNOSIS — E83.42 HYPOMAGNESEMIA: ICD-10-CM

## 2019-01-31 DIAGNOSIS — D72.829 ELEVATED WHITE BLOOD CELL COUNT, UNSPECIFIED: ICD-10-CM

## 2019-01-31 DIAGNOSIS — I47.1 SUPRAVENTRICULAR TACHYCARDIA: ICD-10-CM

## 2019-02-04 ENCOUNTER — APPOINTMENT (OUTPATIENT)
Dept: HEART AND VASCULAR | Facility: CLINIC | Age: 84
End: 2019-02-04
Payer: MEDICARE

## 2019-02-04 VITALS
HEIGHT: 62.5 IN | TEMPERATURE: 98.3 F | HEART RATE: 65 BPM | OXYGEN SATURATION: 97 % | WEIGHT: 154.13 LBS | DIASTOLIC BLOOD PRESSURE: 60 MMHG | BODY MASS INDEX: 27.65 KG/M2 | SYSTOLIC BLOOD PRESSURE: 140 MMHG

## 2019-02-04 DIAGNOSIS — Z83.3 FAMILY HISTORY OF DIABETES MELLITUS: ICD-10-CM

## 2019-02-04 PROCEDURE — 99214 OFFICE O/P EST MOD 30 MIN: CPT

## 2019-02-04 PROCEDURE — 93000 ELECTROCARDIOGRAM COMPLETE: CPT

## 2019-02-04 RX ORDER — TRAMADOL HYDROCHLORIDE 25 MG/1
TABLET, COATED ORAL
Refills: 0 | Status: DISCONTINUED | COMMUNITY
End: 2019-02-04

## 2019-02-04 RX ORDER — MEMANTINE HYDROCHLORIDE 10 MG/1
10 TABLET ORAL
Refills: 0 | Status: ACTIVE | COMMUNITY

## 2019-02-04 RX ORDER — LEVOTHYROXINE SODIUM 0.07 MG/1
75 TABLET ORAL
Refills: 0 | Status: ACTIVE | COMMUNITY

## 2019-02-04 RX ORDER — DEXLANSOPRAZOLE 60 MG/1
60 CAPSULE, DELAYED RELEASE ORAL
Refills: 0 | Status: ACTIVE | COMMUNITY

## 2019-02-04 RX ORDER — CELECOXIB 200 MG/1
200 CAPSULE ORAL
Qty: 60 | Refills: 0 | Status: DISCONTINUED | COMMUNITY
Start: 2017-06-09 | End: 2019-02-04

## 2019-02-04 RX ORDER — METOPROLOL SUCCINATE 25 MG/1
25 TABLET, EXTENDED RELEASE ORAL
Qty: 45 | Refills: 2 | Status: ACTIVE | COMMUNITY

## 2019-02-04 NOTE — REASON FOR VISIT
[FreeTextEntry1] : 89 y/o F recently in [facility] with AF, DVT but no PE after presenting with marked LE edema on right.  She was found to have mild to moderate MR, TR and AI.  PAP was 41mm Hg and her GLENN was also 41.  She was  DC CV to NSR and started on Eliquis.\par \par EKG: NSR, normal axis, short NV interval, no ST-Tw abnormalities. 2/4/19

## 2019-02-04 NOTE — ASSESSMENT
[FreeTextEntry1] : PAF- The patient's [id number] score = 3, continue Eliquis.  She is in NSR today \par \par DVT- edema much better\par \par Valvular heart disease- Mild to moderate MR, TR and AI.  Need to watch for CHF.  Also the TR will contribute to her LE edema which is much better since the hospital\par

## 2019-02-04 NOTE — REVIEW OF SYSTEMS
[Feeling Fatigued] : feeling fatigued [Lower Ext Edema] : lower extremity edema [Memory Lapses Or Loss] : memory lapses or loss [Negative] : Heme/Lymph

## 2019-02-04 NOTE — PHYSICAL EXAM
[Nail Clubbing] : no clubbing of the fingernails [Cyanosis, Localized] : no localized cyanosis [Petechial Hemorrhages (___cm)] : no petechial hemorrhages [Skin Color & Pigmentation] : normal skin color and pigmentation [No Venous Stasis] : no venous stasis [Skin Lesions] : no skin lesions [No Skin Ulcers] : no skin ulcer [No Xanthoma] : no  xanthoma was observed [Oriented To Time, Place, And Person] : oriented to person, place, and time [Affect] : the affect was normal [Mood] : the mood was normal [No Anxiety] : not feeling anxious [General Appearance - Well Developed] : well developed [Normal Appearance] : normal appearance [Well Groomed] : well groomed [General Appearance - Well Nourished] : well nourished [No Deformities] : no deformities [General Appearance - In No Acute Distress] : no acute distress [Normal Conjunctiva] : the conjunctiva exhibited no abnormalities [Eyelids - No Xanthelasma] : the eyelids demonstrated no xanthelasmas [Normal Oral Mucosa] : normal oral mucosa [No Oral Pallor] : no oral pallor [No Oral Cyanosis] : no oral cyanosis [Respiration, Rhythm And Depth] : normal respiratory rhythm and effort [Exaggerated Use Of Accessory Muscles For Inspiration] : no accessory muscle use [Auscultation Breath Sounds / Voice Sounds] : lungs were clear to auscultation bilaterally [Heart Rate And Rhythm] : heart rate and rhythm were normal [Heart Sounds] : normal S1 and S2 [Murmurs] : no murmurs present [Abdomen Soft] : soft [Abdomen Tenderness] : non-tender [] : no hepato-splenomegaly [Abdomen Mass (___ Cm)] : no abdominal mass palpated [FreeTextEntry1] : RLE edema

## 2019-02-25 ENCOUNTER — APPOINTMENT (OUTPATIENT)
Dept: HEART AND VASCULAR | Facility: CLINIC | Age: 84
End: 2019-02-25

## 2019-04-01 ENCOUNTER — APPOINTMENT (OUTPATIENT)
Dept: HEART AND VASCULAR | Facility: CLINIC | Age: 84
End: 2019-04-01
Payer: MEDICARE

## 2019-04-01 VITALS
HEIGHT: 62.5 IN | WEIGHT: 146.98 LBS | TEMPERATURE: 97.7 F | DIASTOLIC BLOOD PRESSURE: 60 MMHG | OXYGEN SATURATION: 98 % | SYSTOLIC BLOOD PRESSURE: 150 MMHG | BODY MASS INDEX: 26.37 KG/M2 | HEART RATE: 84 BPM

## 2019-04-01 DIAGNOSIS — I82.411 ACUTE EMBOLISM AND THROMBOSIS OF RIGHT FEMORAL VEIN: ICD-10-CM

## 2019-04-01 PROCEDURE — 99214 OFFICE O/P EST MOD 30 MIN: CPT

## 2019-04-01 NOTE — REASON FOR VISIT
[FreeTextEntry1] : 89 y/o F recently in [facility] (1/15-18/2019)with AF, DVT but no PE after presenting with marked LE edema on right.  She was found to have mild to moderate MR, TR and AI.  PAP was 41mm Hg and her GLENN was also 41.  She was  DC CV to NSR and started on Eliquis.\par 4/1/19- edema resolved\par \par EKG: NSR, normal axis, short CT interval, no ST-Tw abnormalities. 2/4/19

## 2019-04-01 NOTE — REVIEW OF SYSTEMS
[Feeling Fatigued] : feeling fatigued [Memory Lapses Or Loss] : memory lapses or loss [Negative] : Heme/Lymph

## 2019-04-01 NOTE — PHYSICAL EXAM
[General Appearance - Well Developed] : well developed [Normal Appearance] : normal appearance [Well Groomed] : well groomed [General Appearance - Well Nourished] : well nourished [No Deformities] : no deformities [General Appearance - In No Acute Distress] : no acute distress [Normal Conjunctiva] : the conjunctiva exhibited no abnormalities [Eyelids - No Xanthelasma] : the eyelids demonstrated no xanthelasmas [Normal Oral Mucosa] : normal oral mucosa [No Oral Pallor] : no oral pallor [No Oral Cyanosis] : no oral cyanosis [Respiration, Rhythm And Depth] : normal respiratory rhythm and effort [Exaggerated Use Of Accessory Muscles For Inspiration] : no accessory muscle use [Auscultation Breath Sounds / Voice Sounds] : lungs were clear to auscultation bilaterally [Heart Rate And Rhythm] : heart rate and rhythm were normal [Heart Sounds] : normal S1 and S2 [Murmurs] : no murmurs present [Abdomen Soft] : soft [Abdomen Tenderness] : non-tender [Abdomen Mass (___ Cm)] : no abdominal mass palpated [Nail Clubbing] : no clubbing of the fingernails [Cyanosis, Localized] : no localized cyanosis [Petechial Hemorrhages (___cm)] : no petechial hemorrhages [Skin Color & Pigmentation] : normal skin color and pigmentation [] : no rash [No Venous Stasis] : no venous stasis [Skin Lesions] : no skin lesions [No Skin Ulcers] : no skin ulcer [No Xanthoma] : no  xanthoma was observed [Oriented To Time, Place, And Person] : oriented to person, place, and time [Affect] : the affect was normal [Mood] : the mood was normal [No Anxiety] : not feeling anxious [FreeTextEntry1] : RLE edema was resolved, superficial varicosities

## 2019-04-01 NOTE — ASSESSMENT
[FreeTextEntry1] : PAF- The patient's [id number] score = 3, continue Eliquis.  She is in NSR today with ectopics\par \par DVT- edema much better\par \par Valvular heart disease- Mild to moderate MR, TR and AI.  Need to watch for CHF.  Also the TR will contribute to her LE edema which is much better since the hospital.  Pt and daughter told to watch for increasing SOB and B/L LE edema.\par

## 2019-08-01 ENCOUNTER — APPOINTMENT (OUTPATIENT)
Dept: HEART AND VASCULAR | Facility: CLINIC | Age: 84
End: 2019-08-01
Payer: MEDICARE

## 2019-08-01 VITALS
HEIGHT: 62.5 IN | HEART RATE: 68 BPM | BODY MASS INDEX: 27.99 KG/M2 | SYSTOLIC BLOOD PRESSURE: 132 MMHG | OXYGEN SATURATION: 97 % | WEIGHT: 156 LBS | DIASTOLIC BLOOD PRESSURE: 60 MMHG | TEMPERATURE: 97.9 F

## 2019-08-01 PROCEDURE — 99214 OFFICE O/P EST MOD 30 MIN: CPT

## 2019-08-01 RX ORDER — DOCUSATE SODIUM 100 MG/1
CAPSULE ORAL
Refills: 0 | Status: DISCONTINUED | COMMUNITY
End: 2019-08-01

## 2019-08-01 NOTE — REASON FOR VISIT
[FreeTextEntry1] : 89 y/o F recently in [facility] (1/15-18/2019)with AF, DVT but no PE after presenting with marked LE edema on right.  She was found to have mild to moderate MR, TR and AI.  PAP was 41mm Hg and her GLENN was also 41.  She was  DC CV to NSR and started on Eliquis.\par 4/1/19- edema resolved\par 8/1/19- doing well.\par \par EKG: NSR, normal axis, short GA interval, no ST-Tw abnormalities. 2/4/19

## 2019-08-01 NOTE — ASSESSMENT
[FreeTextEntry1] : PAF- The patient's [id number] score = 3, continue Eliquis.  She is in NSR today by exam\par \par DVT- edema much better\par \par Valvular heart disease- Mild to moderate MR, TR and AI.  Need to watch for CHF.  Also the TR will contribute to her LE edema which is much better since the hospital.  Pt and daughter told to watch for increasing SOB and B/L LE edema.\par

## 2019-08-01 NOTE — PHYSICAL EXAM
[General Appearance - Well Developed] : well developed [Normal Appearance] : normal appearance [Well Groomed] : well groomed [General Appearance - Well Nourished] : well nourished [No Deformities] : no deformities [General Appearance - In No Acute Distress] : no acute distress [Normal Conjunctiva] : the conjunctiva exhibited no abnormalities [Eyelids - No Xanthelasma] : the eyelids demonstrated no xanthelasmas [Normal Oral Mucosa] : normal oral mucosa [No Oral Pallor] : no oral pallor [No Oral Cyanosis] : no oral cyanosis [Respiration, Rhythm And Depth] : normal respiratory rhythm and effort [Exaggerated Use Of Accessory Muscles For Inspiration] : no accessory muscle use [Auscultation Breath Sounds / Voice Sounds] : lungs were clear to auscultation bilaterally [Heart Rate And Rhythm] : heart rate and rhythm were normal [Murmurs] : no murmurs present [Heart Sounds] : normal S1 and S2 [Abdomen Soft] : soft [Abdomen Tenderness] : non-tender [Abdomen Mass (___ Cm)] : no abdominal mass palpated [Nail Clubbing] : no clubbing of the fingernails [Cyanosis, Localized] : no localized cyanosis [Petechial Hemorrhages (___cm)] : no petechial hemorrhages [Skin Color & Pigmentation] : normal skin color and pigmentation [] : no rash [No Venous Stasis] : no venous stasis [Skin Lesions] : no skin lesions [No Skin Ulcers] : no skin ulcer [No Xanthoma] : no  xanthoma was observed [Oriented To Time, Place, And Person] : oriented to person, place, and time [Affect] : the affect was normal [Mood] : the mood was normal [No Anxiety] : not feeling anxious [FreeTextEntry1] : unsteady gait

## 2019-12-13 ENCOUNTER — APPOINTMENT (OUTPATIENT)
Dept: HEART AND VASCULAR | Facility: CLINIC | Age: 84
End: 2019-12-13
Payer: MEDICARE

## 2019-12-13 VITALS
TEMPERATURE: 97.4 F | HEART RATE: 53 BPM | BODY MASS INDEX: 28.19 KG/M2 | DIASTOLIC BLOOD PRESSURE: 60 MMHG | SYSTOLIC BLOOD PRESSURE: 170 MMHG | HEIGHT: 62.5 IN | OXYGEN SATURATION: 98 % | WEIGHT: 157.12 LBS

## 2019-12-13 PROCEDURE — 99214 OFFICE O/P EST MOD 30 MIN: CPT

## 2019-12-13 RX ORDER — HYDROCHLOROTHIAZIDE 25 MG/1
25 TABLET ORAL
Qty: 26 | Refills: 3 | Status: ACTIVE | COMMUNITY
Start: 2019-12-13 | End: 1900-01-01

## 2019-12-13 RX ORDER — MULTIVITAMIN
TABLET ORAL
Refills: 0 | Status: DISCONTINUED | COMMUNITY
End: 2019-12-13

## 2019-12-13 NOTE — REASON FOR VISIT
[FreeTextEntry1] : 89 y/o F recently in [facility] (1/15-18/2019)with AF, DVT but no PE after presenting with marked LE edema on right.  She was found to have mild to moderate MR, TR and AI.  PAP was 41mm Hg and her GLENN was also 41.  She was  DC CV to NSR and started on Eliquis.\par 4/1/19- edema resolved\par 8/1/19- doing well.\par 12/13/19 Continues to do well, BP up today\par \par EKG: NSR, normal axis, short WI interval, no ST-Tw abnormalities. 2/4/19

## 2019-12-13 NOTE — ASSESSMENT
[FreeTextEntry1] : PAF- The patient's [id number] score = 3, continue Eliquis.  She is in NSR today by exam\par \par HTN-  BP up today.  Wide pulse pressure typical of AI.  Will add weekly HCTZ, 25 mg.\par \par DVT- edema much better\par \par Valvular heart disease- Mild to moderate MR, TR and AI.  Need to watch for CHF.  Also the TR will contribute to her LE edema which is much better since the hospital.  Pt and daughter told to watch for increasing SOB and B/L LE edema.\par

## 2019-12-13 NOTE — PHYSICAL EXAM
[General Appearance - Well Developed] : well developed [Normal Appearance] : normal appearance [Well Groomed] : well groomed [General Appearance - Well Nourished] : well nourished [No Deformities] : no deformities [Normal Conjunctiva] : the conjunctiva exhibited no abnormalities [General Appearance - In No Acute Distress] : no acute distress [Eyelids - No Xanthelasma] : the eyelids demonstrated no xanthelasmas [Normal Oral Mucosa] : normal oral mucosa [No Oral Pallor] : no oral pallor [No Oral Cyanosis] : no oral cyanosis [Respiration, Rhythm And Depth] : normal respiratory rhythm and effort [Exaggerated Use Of Accessory Muscles For Inspiration] : no accessory muscle use [Auscultation Breath Sounds / Voice Sounds] : lungs were clear to auscultation bilaterally [Heart Rate And Rhythm] : heart rate and rhythm were normal [Heart Sounds] : normal S1 and S2 [Murmurs] : no murmurs present [Abdomen Tenderness] : non-tender [Abdomen Soft] : soft [Abdomen Mass (___ Cm)] : no abdominal mass palpated [Nail Clubbing] : no clubbing of the fingernails [Cyanosis, Localized] : no localized cyanosis [Petechial Hemorrhages (___cm)] : no petechial hemorrhages [Skin Color & Pigmentation] : normal skin color and pigmentation [] : no rash [Skin Lesions] : no skin lesions [No Venous Stasis] : no venous stasis [Oriented To Time, Place, And Person] : oriented to person, place, and time [No Skin Ulcers] : no skin ulcer [No Xanthoma] : no  xanthoma was observed [No Anxiety] : not feeling anxious [Affect] : the affect was normal [Mood] : the mood was normal [FreeTextEntry1] : trace LE edema, superficial varicosities

## 2020-01-01 ENCOUNTER — RX RENEWAL (OUTPATIENT)
Age: 85
End: 2020-01-01

## 2020-01-22 ENCOUNTER — RX RENEWAL (OUTPATIENT)
Age: 85
End: 2020-01-22

## 2020-03-13 ENCOUNTER — APPOINTMENT (OUTPATIENT)
Dept: HEART AND VASCULAR | Facility: CLINIC | Age: 85
End: 2020-03-13
Payer: MEDICARE

## 2020-03-13 VITALS
OXYGEN SATURATION: 98 % | WEIGHT: 156.48 LBS | BODY MASS INDEX: 28.08 KG/M2 | DIASTOLIC BLOOD PRESSURE: 60 MMHG | TEMPERATURE: 97.6 F | HEART RATE: 51 BPM | HEIGHT: 62.5 IN | SYSTOLIC BLOOD PRESSURE: 130 MMHG

## 2020-03-13 DIAGNOSIS — F03.90 UNSPECIFIED DEMENTIA W/OUT BEHAVIORAL DISTURBANCE: ICD-10-CM

## 2020-03-13 DIAGNOSIS — I48.0 PAROXYSMAL ATRIAL FIBRILLATION: ICD-10-CM

## 2020-03-13 DIAGNOSIS — I35.1 NONRHEUMATIC AORTIC (VALVE) INSUFFICIENCY: ICD-10-CM

## 2020-03-13 DIAGNOSIS — I10 ESSENTIAL (PRIMARY) HYPERTENSION: ICD-10-CM

## 2020-03-13 DIAGNOSIS — I34.0 NONRHEUMATIC MITRAL (VALVE) INSUFFICIENCY: ICD-10-CM

## 2020-03-13 PROCEDURE — 93000 ELECTROCARDIOGRAM COMPLETE: CPT

## 2020-03-13 PROCEDURE — 99214 OFFICE O/P EST MOD 30 MIN: CPT

## 2020-03-13 NOTE — ASSESSMENT
[FreeTextEntry1] : PAF- The patient's [id number] score = 3, continue Eliquis.  She is in NSR today by exam and EKG.  \par \par HTN-  BP up today.  Wide pulse pressure typical of AI.  Echo at [facility] 1/2019, mild to moderate MR and I Will add weekly HCTZ, 25 mg.\par \par DVT- edema much better\par \par Valvular heart disease- Mild to moderate MR, TR and AI.  Need to watch for CHF.  Also the TR will contribute to her LE edema which is much better since the hospital.  Pt and daughter told to watch for increasing SOB and B/L LE edema.\par

## 2020-03-13 NOTE — REASON FOR VISIT
[FreeTextEntry1] : 89 y/o F recently in [facility] (1/15-18/2019)with AF, DVT but no PE after presenting with marked LE edema on right.  She was found to have mild to moderate MR, TR and AI.  PAP was 41mm Hg and her GLENN was also 41.  She was  DC CV to NSR and started on Eliquis.\par 4/1/19- edema resolved\par 8/1/19- doing well.\par 12/13/19 Continues to do well, BP up today\par 3/13/20- stable\par \par EKG: NSR, normal axis, short SC interval, no ST-Tw abnormalities.  3/13/20

## 2020-03-13 NOTE — PHYSICAL EXAM
[General Appearance - Well Developed] : well developed [Normal Appearance] : normal appearance [Well Groomed] : well groomed [General Appearance - Well Nourished] : well nourished [No Deformities] : no deformities [General Appearance - In No Acute Distress] : no acute distress [Normal Conjunctiva] : the conjunctiva exhibited no abnormalities [Eyelids - No Xanthelasma] : the eyelids demonstrated no xanthelasmas [Normal Oral Mucosa] : normal oral mucosa [No Oral Pallor] : no oral pallor [No Oral Cyanosis] : no oral cyanosis [Respiration, Rhythm And Depth] : normal respiratory rhythm and effort [Exaggerated Use Of Accessory Muscles For Inspiration] : no accessory muscle use [Auscultation Breath Sounds / Voice Sounds] : lungs were clear to auscultation bilaterally [Heart Rate And Rhythm] : heart rate and rhythm were normal [Heart Sounds] : normal S1 and S2 [Murmurs] : no murmurs present [Abdomen Soft] : soft [Abdomen Tenderness] : non-tender [Abdomen Mass (___ Cm)] : no abdominal mass palpated [Nail Clubbing] : no clubbing of the fingernails [Cyanosis, Localized] : no localized cyanosis [Petechial Hemorrhages (___cm)] : no petechial hemorrhages [Skin Color & Pigmentation] : normal skin color and pigmentation [] : no rash [No Venous Stasis] : no venous stasis [Skin Lesions] : no skin lesions [No Skin Ulcers] : no skin ulcer [No Xanthoma] : no  xanthoma was observed [Oriented To Time, Place, And Person] : oriented to person, place, and time [Affect] : the affect was normal [Mood] : the mood was normal [No Anxiety] : not feeling anxious [FreeTextEntry1] : trace LE edema, superficial varicosities

## 2020-06-01 ENCOUNTER — INPATIENT (INPATIENT)
Facility: HOSPITAL | Age: 85
LOS: 1 days | Discharge: HOME | DRG: 310 | End: 2020-06-03
Attending: INTERNAL MEDICINE | Admitting: INTERNAL MEDICINE
Payer: MEDICARE

## 2020-06-01 VITALS
OXYGEN SATURATION: 98 % | HEART RATE: 152 BPM | HEIGHT: 62 IN | WEIGHT: 154.98 LBS | TEMPERATURE: 98 F | DIASTOLIC BLOOD PRESSURE: 121 MMHG | SYSTOLIC BLOOD PRESSURE: 143 MMHG | RESPIRATION RATE: 18 BRPM

## 2020-06-01 DIAGNOSIS — Z96.642 PRESENCE OF LEFT ARTIFICIAL HIP JOINT: Chronic | ICD-10-CM

## 2020-06-01 DIAGNOSIS — I48.91 UNSPECIFIED ATRIAL FIBRILLATION: ICD-10-CM

## 2020-06-01 DIAGNOSIS — F03.90 UNSPECIFIED DEMENTIA WITHOUT BEHAVIORAL DISTURBANCE: ICD-10-CM

## 2020-06-01 LAB
ALBUMIN SERPL ELPH-MCNC: 3.1 G/DL — LOW (ref 3.3–5)
ALBUMIN SERPL ELPH-MCNC: 3.3 G/DL — SIGNIFICANT CHANGE UP (ref 3.3–5)
ALP SERPL-CCNC: 98 U/L — SIGNIFICANT CHANGE UP (ref 40–120)
ALP SERPL-CCNC: SIGNIFICANT CHANGE UP (ref 40–120)
ALT FLD-CCNC: 9 U/L — LOW (ref 10–45)
ALT FLD-CCNC: SIGNIFICANT CHANGE UP (ref 10–45)
ANION GAP SERPL CALC-SCNC: 10 MMOL/L — SIGNIFICANT CHANGE UP (ref 5–17)
ANION GAP SERPL CALC-SCNC: 11 MMOL/L — SIGNIFICANT CHANGE UP (ref 5–17)
APTT BLD: 26.5 SEC — LOW (ref 27.5–36.3)
AST SERPL-CCNC: 17 U/L — SIGNIFICANT CHANGE UP (ref 10–40)
AST SERPL-CCNC: SIGNIFICANT CHANGE UP (ref 10–40)
BASOPHILS # BLD AUTO: 0.08 K/UL — SIGNIFICANT CHANGE UP (ref 0–0.2)
BASOPHILS NFR BLD AUTO: 0.8 % — SIGNIFICANT CHANGE UP (ref 0–2)
BILIRUB SERPL-MCNC: 0.2 MG/DL — SIGNIFICANT CHANGE UP (ref 0.2–1.2)
BILIRUB SERPL-MCNC: 0.2 MG/DL — SIGNIFICANT CHANGE UP (ref 0.2–1.2)
BUN SERPL-MCNC: 25 MG/DL — HIGH (ref 7–23)
BUN SERPL-MCNC: 26 MG/DL — HIGH (ref 7–23)
CALCIUM SERPL-MCNC: 10.3 MG/DL — SIGNIFICANT CHANGE UP (ref 8.4–10.5)
CALCIUM SERPL-MCNC: 10.5 MG/DL — SIGNIFICANT CHANGE UP (ref 8.4–10.5)
CHLORIDE SERPL-SCNC: 107 MMOL/L — SIGNIFICANT CHANGE UP (ref 96–108)
CHLORIDE SERPL-SCNC: 110 MMOL/L — HIGH (ref 96–108)
CO2 SERPL-SCNC: 23 MMOL/L — SIGNIFICANT CHANGE UP (ref 22–31)
CO2 SERPL-SCNC: 24 MMOL/L — SIGNIFICANT CHANGE UP (ref 22–31)
CREAT SERPL-MCNC: 0.93 MG/DL — SIGNIFICANT CHANGE UP (ref 0.5–1.3)
CREAT SERPL-MCNC: 0.96 MG/DL — SIGNIFICANT CHANGE UP (ref 0.5–1.3)
EOSINOPHIL # BLD AUTO: 0.1 K/UL — SIGNIFICANT CHANGE UP (ref 0–0.5)
EOSINOPHIL NFR BLD AUTO: 1.1 % — SIGNIFICANT CHANGE UP (ref 0–6)
GLUCOSE SERPL-MCNC: 132 MG/DL — HIGH (ref 70–99)
GLUCOSE SERPL-MCNC: 94 MG/DL — SIGNIFICANT CHANGE UP (ref 70–99)
HCT VFR BLD CALC: 48.1 % — HIGH (ref 34.5–45)
HGB BLD-MCNC: 15.2 G/DL — SIGNIFICANT CHANGE UP (ref 11.5–15.5)
IMM GRANULOCYTES NFR BLD AUTO: 0.9 % — SIGNIFICANT CHANGE UP (ref 0–1.5)
INR BLD: 2.37 — HIGH (ref 0.88–1.16)
LYMPHOCYTES # BLD AUTO: 2.1 K/UL — SIGNIFICANT CHANGE UP (ref 1–3.3)
LYMPHOCYTES # BLD AUTO: 22.1 % — SIGNIFICANT CHANGE UP (ref 13–44)
MCHC RBC-ENTMCNC: 31.6 GM/DL — LOW (ref 32–36)
MCHC RBC-ENTMCNC: 32.3 PG — SIGNIFICANT CHANGE UP (ref 27–34)
MCV RBC AUTO: 102.3 FL — HIGH (ref 80–100)
MONOCYTES # BLD AUTO: 0.86 K/UL — SIGNIFICANT CHANGE UP (ref 0–0.9)
MONOCYTES NFR BLD AUTO: 9 % — SIGNIFICANT CHANGE UP (ref 2–14)
NEUTROPHILS # BLD AUTO: 6.28 K/UL — SIGNIFICANT CHANGE UP (ref 1.8–7.4)
NEUTROPHILS NFR BLD AUTO: 66.1 % — SIGNIFICANT CHANGE UP (ref 43–77)
NRBC # BLD: 0 /100 WBCS — SIGNIFICANT CHANGE UP (ref 0–0)
NT-PROBNP SERPL-SCNC: 5251 PG/ML — HIGH (ref 0–300)
PLATELET # BLD AUTO: 262 K/UL — SIGNIFICANT CHANGE UP (ref 150–400)
POTASSIUM SERPL-MCNC: 4.9 MMOL/L — SIGNIFICANT CHANGE UP (ref 3.5–5.3)
POTASSIUM SERPL-MCNC: SIGNIFICANT CHANGE UP (ref 3.5–5.3)
POTASSIUM SERPL-SCNC: 4.9 MMOL/L — SIGNIFICANT CHANGE UP (ref 3.5–5.3)
POTASSIUM SERPL-SCNC: SIGNIFICANT CHANGE UP (ref 3.5–5.3)
PROT SERPL-MCNC: 6.4 G/DL — SIGNIFICANT CHANGE UP (ref 6–8.3)
PROT SERPL-MCNC: 7 G/DL — SIGNIFICANT CHANGE UP (ref 6–8.3)
PROTHROM AB SERPL-ACNC: 27.7 SEC — HIGH (ref 10–12.9)
RBC # BLD: 4.7 M/UL — SIGNIFICANT CHANGE UP (ref 3.8–5.2)
RBC # FLD: 13.6 % — SIGNIFICANT CHANGE UP (ref 10.3–14.5)
SARS-COV-2 RNA SPEC QL NAA+PROBE: SIGNIFICANT CHANGE UP
SODIUM SERPL-SCNC: 141 MMOL/L — SIGNIFICANT CHANGE UP (ref 135–145)
SODIUM SERPL-SCNC: 144 MMOL/L — SIGNIFICANT CHANGE UP (ref 135–145)
TROPONIN T SERPL-MCNC: <0.01 NG/ML — SIGNIFICANT CHANGE UP (ref 0–0.01)
WBC # BLD: 9.51 K/UL — SIGNIFICANT CHANGE UP (ref 3.8–10.5)
WBC # FLD AUTO: 9.51 K/UL — SIGNIFICANT CHANGE UP (ref 3.8–10.5)

## 2020-06-01 PROCEDURE — 71045 X-RAY EXAM CHEST 1 VIEW: CPT | Mod: 26

## 2020-06-01 PROCEDURE — 93010 ELECTROCARDIOGRAM REPORT: CPT

## 2020-06-01 PROCEDURE — 99291 CRITICAL CARE FIRST HOUR: CPT

## 2020-06-01 RX ORDER — METOPROLOL TARTRATE 50 MG
5 TABLET ORAL ONCE
Refills: 0 | Status: COMPLETED | OUTPATIENT
Start: 2020-06-01 | End: 2020-06-01

## 2020-06-01 RX ORDER — LEVOTHYROXINE SODIUM 125 MCG
75 TABLET ORAL DAILY
Refills: 0 | Status: DISCONTINUED | OUTPATIENT
Start: 2020-06-02 | End: 2020-06-03

## 2020-06-01 RX ORDER — ENOXAPARIN SODIUM 100 MG/ML
40 INJECTION SUBCUTANEOUS EVERY 24 HOURS
Refills: 0 | Status: DISCONTINUED | OUTPATIENT
Start: 2020-06-01 | End: 2020-06-01

## 2020-06-01 RX ORDER — MEMANTINE HYDROCHLORIDE 10 MG/1
10 TABLET ORAL DAILY
Refills: 0 | Status: DISCONTINUED | OUTPATIENT
Start: 2020-06-01 | End: 2020-06-03

## 2020-06-01 RX ORDER — METOPROLOL TARTRATE 50 MG
25 TABLET ORAL DAILY
Refills: 0 | Status: DISCONTINUED | OUTPATIENT
Start: 2020-06-02 | End: 2020-06-02

## 2020-06-01 RX ORDER — PANTOPRAZOLE SODIUM 20 MG/1
40 TABLET, DELAYED RELEASE ORAL
Refills: 0 | Status: DISCONTINUED | OUTPATIENT
Start: 2020-06-01 | End: 2020-06-03

## 2020-06-01 RX ADMIN — Medication 5 MILLIGRAM(S): at 16:49

## 2020-06-01 RX ADMIN — MEMANTINE HYDROCHLORIDE 10 MILLIGRAM(S): 10 TABLET ORAL at 22:37

## 2020-06-01 RX ADMIN — Medication 5 MILLIGRAM(S): at 16:04

## 2020-06-01 NOTE — H&P ADULT - HISTORY OF PRESENT ILLNESS
89 y/o F with h/o Afib (on Eliquis/ Toprol), s/p DCCVin 2019,  dementia, hypothyroidism, GERD presented to her PMDs office where she was noted to be in rapid Afib and subsequently sent to the ED. Upon arrival to the ED,  Pt does not recall the initial purpose of her visit. According to patient's charge, her family acknowledges that she is compliant with he medications. 89 y/o F with h/o Afib (on Eliquis/ Toprol), s/p DCCVin 2019,  dementia, hypothyroidism, GERD presented to her PMDs office where she was noted to be in rapid Afib and subsequently sent to the ED. Upon arrival to the ED, pt was asymptomatic.  Pt does not recall the initial purpose of her visit to her doctor. She denies any chest pain, SOB, dizziness, or syncope. ECG revealed Afib with ventricular  rate of 125 bpm. Pt was given Lopressor 5 mg IV x 2 with improvement of HR to 100's-110's.  HR did intermittently but briefly  jump to 120's-130's.  Labs were as follows: Troponin #1 < 0.0.;  BNP 5251; COVID negative. INR was therapeutic at 2.3. According to patient's chart, her family acknowledges that she is compliant with her medications. In light of her risk factors and symptoms, patient is admitted for further evaluation and management. 89 y/o F with h/o Afib (on Eliquis/ Toprol), s/p DCCVin 2019,  dementia, hypothyroidism, GERD presented to her PMDs office where she was noted to be in rapid Afib and subsequently sent to the ED. Upon arrival to the ED, pt was asymptomatic.  Pt does not recall the initial purpose of her visit to her doctor. She denies any chest pain, SOB, dizziness, or syncope. ECG revealed Afib with ventricular  rate of 125 bpm. Pt was given Lopressor 5 mg IV x 2 with improvement of HR to 100's-110's.  HR did intermittently but briefly  jump to 120's-130's.  Labs were as follows: Troponin #1 < 0.0.;  BNP 5251; COVID negative. According to patient's chart, her family acknowledges that she is compliant with her medications. In light of her risk factors and symptoms, patient is admitted for further evaluation and management. 89 y/o F with h/o Afib (on Eliquis/ Toprol), s/p DCCVin 2019,  dementia, hypothyroidism, GERD presented to her PMDs office where she was noted to be in rapid Afib and subsequently sent to the ED. Upon arrival to the ED, pt was asymptomatic. According to patient's daughter, she initially presented to her PMD secondary to a sore on her R calf. Pt denies any chest pain, SOB, dizziness, or syncope. ECG revealed Afib with ventricular  rate of 125 bpm. Pt was given Lopressor 5 mg IV x 2 with improvement of HR to 100's-110's.  HR did intermittently but briefly  jump to 120's-130's.  Labs were as follows: Troponin #1 < 0.0.;  BNP 5251; COVID negative. According to patient's chart, her family acknowledges that she is compliant with her medications. In light of her risk factors and symptoms, patient is admitted for further evaluation and management.

## 2020-06-01 NOTE — ED ADULT NURSE NOTE - OBJECTIVE STATEMENT
Patient is a 89yo F, arrived to ED via walk-in, AAOx3, in NAD, tachycardic, sent by PMH for rapid A-fib.  Patient denies any CP, SOB, dizziness, palpitations, N/V/D, fevers or any other complaints.  Patient states, "I feel fine, I do not even know why I am here."  PIV placed, labs drawn and sent, EKG done, placed on cardiac monitor.

## 2020-06-01 NOTE — ED PROVIDER NOTE - CARE PLAN
Principal Discharge DX:	Atrial fibrillation with rapid ventricular response  Secondary Diagnosis:	Pleural effusion

## 2020-06-01 NOTE — ED PROVIDER NOTE - CRITICAL CARE PROVIDED
documentation/additional history taking/interpretation of diagnostic studies/direct patient care (not related to procedure)/consult w/ pt's family directly relating to pts condition/consultation with other physicians

## 2020-06-01 NOTE — ED ADULT NURSE NOTE - NSIMPLEMENTINTERV_GEN_ALL_ED
Implemented All Fall with Harm Risk Interventions:  [place] to call system. Call bell, personal items and telephone within reach. Instruct patient to call for assistance. Room bathroom lighting operational. Non-slip footwear when patient is off stretcher. Physically safe environment: no spills, clutter or unnecessary equipment. Stretcher in lowest position, wheels locked, appropriate side rails in place. Provide visual cue, wrist band, yellow gown, etc. Monitor gait and stability. Monitor for mental status changes and reorient to person, place, and time. Review medications for side effects contributing to fall risk. Reinforce activity limits and safety measures with patient and family. Provide visual clues: red socks.

## 2020-06-01 NOTE — H&P ADULT - NSHPLABSRESULTS_GEN_ALL_CORE
15.2   9.51  )-----------( 262      ( 01 Jun 2020 16:03 )             48.1   06-01    144  |  110<H>  |  25<H>  ----------------------------<  132<H>  4.9   |  23  |  0.96    Ca    10.5      01 Jun 2020 18:18    TPro  6.4  /  Alb  3.1<L>  /  TBili  0.2  /  DBili  x   /  AST  17  /  ALT  9<L>  /  AlkPhos  98  06-01  PT/INR - ( 01 Jun 2020 16:03 )   PT: 27.7 sec;   INR: 2.37          PTT - ( 01 Jun 2020 16:03 )  PTT:26.5 sec

## 2020-06-01 NOTE — ED ADULT TRIAGE NOTE - CHIEF COMPLAINT QUOTE
Pt directed to ED from PMD [place] AFib with RVR.  Daughter states "She's always had AFib but Dr. Marmolejo said it was far too fast right now."  Pt denies N/V/D, SOB, FEvers, CP, Dizziness.  Masked PTA.

## 2020-06-01 NOTE — H&P ADULT - ASSESSMENT
89 y/o F with h/o Afib (on Eliquis/ Toprol), s/p DCCVin 2019,  dementia, hypothyroidism, GERD presented to her PMDs office where she was noted to be in rapid Afib and subsequently sent to the ED where she was noted to be in rapid Afib with IV Lopressor given with subsequent improved rate control, now admitted for further evaluation and management.

## 2020-06-01 NOTE — H&P ADULT - NSICDXPASTMEDICALHX_GEN_ALL_CORE_FT
PAST MEDICAL HISTORY:  Atrial fibrillation     Dementia     GERD (gastroesophageal reflux disease)     Hypothyroid

## 2020-06-01 NOTE — H&P ADULT - NSHPPHYSICALEXAM_GEN_ALL_CORE
Gen: Pt lying on stretcher in no acute distress  HEENT: sclera nonicteric; EOMI;   Neck: Supple; (-) JVD; (-) bruits appreciated; (-) lymphadenopathy  CVS: Irregular; (+) S1S2; (-) murmur  Lungs: CTA b/l; no w/r/r  Abdomen: Soft; NT; ND; (+) BS  Ext: (-) edema; (-) cyanosis  Neuro: AAO x 2; (-) gross neurologic deficit

## 2020-06-01 NOTE — H&P ADULT - PROBLEM SELECTOR PLAN 1
- -NPO for DCCV  -consider LINDA  -AM ECG  -continue home dose of Toprol XL 25 mg daily  -hold home dose of Eliquis -NPO for DCCV  -consider ECHO  -AM ECG  -continue home dose of Toprol XL 25 mg daily  -hold home dose of Eliquis

## 2020-06-01 NOTE — ED ADULT NURSE NOTE - CHPI ED NUR SYMPTOMS NEG
no diaphoresis/no vomiting/no nausea/no shortness of breath/no dizziness/no chest pain/no fever/no syncope

## 2020-06-01 NOTE — ED PROVIDER NOTE - OBJECTIVE STATEMENT
89 year old female with history of atrial fibrillation (eliquis, toprol), dementia, hypothyroidism, GERD presents to ED with concern for atrial fibrillation with rapid ventricular response detected at Dr. Marmolejo's office today.  Patient noted to have history of afib and family notes she has been taking her A/C and rate control medications as prescribed.  She is followed by Dr. Cummings and has also been seen by EP in the past (Dr. Diana) and had successful cardioversion in the past (2019).  Patient is stable on arrival to ED and asymptomatic.  She denies fever, chills, chest pain, shortness of breath, dizziness, headache, abdominal pain, nausea, emesis, peripheral edema or any additional acute complaints or concerns at this time.  Of note, patient is a poor historian, however does answer simple yes/no questions and provides limited history.

## 2020-06-01 NOTE — ED PROVIDER NOTE - PHYSICAL EXAMINATION
VITAL SIGNS: I have reviewed nursing notes and confirm.  CONSTITUTIONAL: Well-developed; well-nourished; in no acute distress.   SKIN:  warm and dry, no acute rash.   HEAD:  normocephalic, atraumatic.  EYES: PERRL.  EOM intact; conjunctiva and sclera clear.  ENT: No nasal discharge; airway clear.   NECK: Supple; non tender.  CARD: Tachycardic rate, irregular rhythm.  RESP:  Clear to auscultation b/l, no wheezes, rales or rhonchi.  ABD: Normal bowel sounds; soft; non-distended; non-tender; no guarding/rebound.  EXT: Normal ROM. No clubbing, cyanosis or edema. 2+ pulses to b/l ue/le.  NEURO: Alert, oriented, grossly unremarkable.  5/5 strength x 4 extremities against gravity and external force.  No drift x 4 extremities.  Sensation intact and symmetric x 4 extremities.  No facial asymmetry.    PSYCH: Cooperative, mood and affect appropriate.

## 2020-06-01 NOTE — ED PROVIDER NOTE - CLINICAL SUMMARY MEDICAL DECISION MAKING FREE TEXT BOX
Patient presents to ED from Dr. Marmolejo's office after being found to be in rapid atrial fibrillation.  Patient with baseline dementia, though non toxic on arrival to ED and without complaints or CP, SOB, dizziness, etc.  Patient on beta blockers and A/C, confirmed with family to be taking as prescribed.  Labs and imaging reviewed.  New left sided pleural effusion noted, BNP elevated, in no resp distress.  I spoke with patient's PCP, Dr. Marmolejo, who is agreeable to cardiology admission, etc.  Patient is followed by Dr. Cummings, who is also contacted and aware of plan for admission - unable to admit directly to private cardiologist in setting of COVID-189 pandemic, etc.  EP also aware - Dr. Campbell aware patient will be admitted to on call cardiac tele physician and will consult/follow for possible cardioversion tomorrow.  Patient remains stable throughout her stay in ED with some improvement in HR with metoprolol.  Will plan for cardiac tele admission at this time.

## 2020-06-02 ENCOUNTER — TRANSCRIPTION ENCOUNTER (OUTPATIENT)
Age: 85
End: 2020-06-02

## 2020-06-02 LAB
ANION GAP SERPL CALC-SCNC: 9 MMOL/L — SIGNIFICANT CHANGE UP (ref 5–17)
BUN SERPL-MCNC: 22 MG/DL — SIGNIFICANT CHANGE UP (ref 7–23)
CALCIUM SERPL-MCNC: 10.1 MG/DL — SIGNIFICANT CHANGE UP (ref 8.4–10.5)
CHLORIDE SERPL-SCNC: 110 MMOL/L — HIGH (ref 96–108)
CHOLEST SERPL-MCNC: 179 MG/DL — SIGNIFICANT CHANGE UP (ref 10–199)
CO2 SERPL-SCNC: 24 MMOL/L — SIGNIFICANT CHANGE UP (ref 22–31)
CREAT SERPL-MCNC: 0.94 MG/DL — SIGNIFICANT CHANGE UP (ref 0.5–1.3)
GLUCOSE SERPL-MCNC: 100 MG/DL — HIGH (ref 70–99)
HCT VFR BLD CALC: 42 % — SIGNIFICANT CHANGE UP (ref 34.5–45)
HDLC SERPL-MCNC: 36 MG/DL — LOW
HGB BLD-MCNC: 13.6 G/DL — SIGNIFICANT CHANGE UP (ref 11.5–15.5)
INR BLD: 1.86 — HIGH (ref 0.88–1.16)
LIPID PNL WITH DIRECT LDL SERPL: 127 MG/DL — HIGH
MCHC RBC-ENTMCNC: 32.4 GM/DL — SIGNIFICANT CHANGE UP (ref 32–36)
MCHC RBC-ENTMCNC: 32.5 PG — SIGNIFICANT CHANGE UP (ref 27–34)
MCV RBC AUTO: 100.5 FL — HIGH (ref 80–100)
NRBC # BLD: 0 /100 WBCS — SIGNIFICANT CHANGE UP (ref 0–0)
PLATELET # BLD AUTO: 195 K/UL — SIGNIFICANT CHANGE UP (ref 150–400)
POTASSIUM SERPL-MCNC: 4.2 MMOL/L — SIGNIFICANT CHANGE UP (ref 3.5–5.3)
POTASSIUM SERPL-SCNC: 4.2 MMOL/L — SIGNIFICANT CHANGE UP (ref 3.5–5.3)
PROTHROM AB SERPL-ACNC: 21.6 SEC — HIGH (ref 10–12.9)
RBC # BLD: 4.18 M/UL — SIGNIFICANT CHANGE UP (ref 3.8–5.2)
RBC # FLD: 13.8 % — SIGNIFICANT CHANGE UP (ref 10.3–14.5)
SODIUM SERPL-SCNC: 143 MMOL/L — SIGNIFICANT CHANGE UP (ref 135–145)
TOTAL CHOLESTEROL/HDL RATIO MEASUREMENT: 5 RATIO — SIGNIFICANT CHANGE UP (ref 3.3–7.1)
TRIGL SERPL-MCNC: 81 MG/DL — SIGNIFICANT CHANGE UP (ref 10–149)
TROPONIN T SERPL-MCNC: <0.01 NG/ML — SIGNIFICANT CHANGE UP (ref 0–0.01)
WBC # BLD: 9.37 K/UL — SIGNIFICANT CHANGE UP (ref 3.8–10.5)
WBC # FLD AUTO: 9.37 K/UL — SIGNIFICANT CHANGE UP (ref 3.8–10.5)

## 2020-06-02 PROCEDURE — 92960 CARDIOVERSION ELECTRIC EXT: CPT

## 2020-06-02 PROCEDURE — 99239 HOSP IP/OBS DSCHRG MGMT >30: CPT

## 2020-06-02 PROCEDURE — 93306 TTE W/DOPPLER COMPLETE: CPT | Mod: 26

## 2020-06-02 PROCEDURE — 99223 1ST HOSP IP/OBS HIGH 75: CPT | Mod: 25

## 2020-06-02 PROCEDURE — 93010 ELECTROCARDIOGRAM REPORT: CPT

## 2020-06-02 RX ORDER — PANTOPRAZOLE SODIUM 20 MG/1
1 TABLET, DELAYED RELEASE ORAL
Qty: 0 | Refills: 0 | DISCHARGE
Start: 2020-06-02

## 2020-06-02 RX ORDER — METOPROLOL TARTRATE 50 MG
50 TABLET ORAL DAILY
Refills: 0 | Status: DISCONTINUED | OUTPATIENT
Start: 2020-06-03 | End: 2020-06-03

## 2020-06-02 RX ORDER — APIXABAN 2.5 MG/1
5 TABLET, FILM COATED ORAL
Refills: 0 | Status: DISCONTINUED | OUTPATIENT
Start: 2020-06-02 | End: 2020-06-03

## 2020-06-02 RX ORDER — METOPROLOL TARTRATE 50 MG
1 TABLET ORAL
Qty: 30 | Refills: 3
Start: 2020-06-02 | End: 2020-09-29

## 2020-06-02 RX ORDER — AMIODARONE HYDROCHLORIDE 400 MG/1
200 TABLET ORAL DAILY
Refills: 0 | Status: DISCONTINUED | OUTPATIENT
Start: 2020-06-02 | End: 2020-06-03

## 2020-06-02 RX ORDER — METOPROLOL TARTRATE 50 MG
25 TABLET ORAL ONCE
Refills: 0 | Status: COMPLETED | OUTPATIENT
Start: 2020-06-02 | End: 2020-06-02

## 2020-06-02 RX ORDER — AMIODARONE HYDROCHLORIDE 400 MG/1
1 TABLET ORAL
Qty: 30 | Refills: 3
Start: 2020-06-02 | End: 2020-09-29

## 2020-06-02 RX ADMIN — MEMANTINE HYDROCHLORIDE 10 MILLIGRAM(S): 10 TABLET ORAL at 13:23

## 2020-06-02 RX ADMIN — AMIODARONE HYDROCHLORIDE 200 MILLIGRAM(S): 400 TABLET ORAL at 20:31

## 2020-06-02 RX ADMIN — Medication 25 MILLIGRAM(S): at 06:31

## 2020-06-02 RX ADMIN — APIXABAN 5 MILLIGRAM(S): 2.5 TABLET, FILM COATED ORAL at 18:43

## 2020-06-02 RX ADMIN — APIXABAN 5 MILLIGRAM(S): 2.5 TABLET, FILM COATED ORAL at 06:31

## 2020-06-02 RX ADMIN — Medication 75 MICROGRAM(S): at 06:31

## 2020-06-02 RX ADMIN — PANTOPRAZOLE SODIUM 40 MILLIGRAM(S): 20 TABLET, DELAYED RELEASE ORAL at 06:31

## 2020-06-02 RX ADMIN — Medication 25 MILLIGRAM(S): at 10:02

## 2020-06-02 NOTE — CHART NOTE - NSCHARTNOTEFT_GEN_A_CORE
Patient seen and examined at bedside. EP consulted this AM on 06/02/2020. Patient in Atrial fibrillation with hr in 110-120s. Confirmed compliance with patient and daughter of patient's Eliquis. No need for LINDA at this time. Patient received DCCV and converted back to NSR. S/P DCCV patient converted to sinus bradycardia. Patient also had episodes of 6 seconds of atrial fibrillation this afternoon on 06/02/2020. Patient started on Amiodarone 200 mg after cardioversion and Toprol XL 50 mg. Case discussed with Dr. Davila. Patient stable for discharge. Spoke to patient and her daughter. Patient's daughter does not want patient to leave this PM due to riots and protestors. 24 hour notice given, in chart. Explained to patient the risks of staying in the hospital for an extended period due to an increased risk of infection. Patient and daughter agree to leave tomorrow on 06/03/2020.

## 2020-06-02 NOTE — CONSULT NOTE ADULT - SUBJECTIVE AND OBJECTIVE BOX
Electrophysiology Consult Note:     CHIEF COMPLAINT:  Patient is a 89y old  Female who presents with a chief complaint of Palpitations (02 Jun 2020 07:24)        HISTORY OF PRESENT ILLNESS:   HPI:  89 y/o F with history of  Afib (on Eliquis/ Toprol), s/p DCCV in 2019,  dementia, hypothyroidism, GERD presented to her PMDs office where she was noted to be in rapid Afib and subsequently sent to the ED.   EPS called to evaluate for cardioversion.      PAST MEDICAL & SURGICAL HISTORY:  Atrial fibrillation  GERD (gastroesophageal reflux disease)  Dementia  Hypothyroid  History of hip replacement, total, left      FAMILY HISTORY:  No pertinent family history in first degree relatives      SOCIAL HISTORY:    [x ] Non-smokerDr      Allergies    No Known Allergies    Intolerances    	    MEDICATIONS:  MEDICATIONS  (STANDING):  apixaban 5 milliGRAM(s) Oral two times a day  levothyroxine 75 MICROGram(s) Oral daily  memantine 10 milliGRAM(s) Oral daily  pantoprazole    Tablet 40 milliGRAM(s) Oral before breakfast    MEDICATIONS  (PRN):        REVIEW OF SYSTEMS:  CONSTITUTIONAL: No fever, weight loss, or fatigue  EYES: No eye pain, visual disturbances, or discharge  ENMT:  No difficulty hearing, tinnitus, vertigo; No sinus or throat pain  NECK: No pain or stiffnes  RESPIRATORY: No cough, wheezing, chills or hemoptysis; No Shortness of Breath  CARDIOVASCULAR: No chest pain, palpitations, dizziness, or leg swelling  GASTROINTESTINAL: No abdominal or epigastric pain. No nausea, vomiting, or hematemesis; No diarrhea or constipation.   GENITOURINARY: No dysuria, frequency, hematuria, or incontinence  NEUROLOGICAL: No headaches, memory loss, loss of strength, numbness, or tremors  SKIN: No itching, burning, rashes, or lesions   LYMPH Nodes: No enlarged glands  ENDOCRINE: No heat or cold intolerance; No hair loss    PHYSICAL EXAM:  Vital Signs Last 24 Hrs  T(C): 36.6 (02 Jun 2020 08:35), Max: 37.4 (01 Jun 2020 15:56)  T(F): 97.8 (02 Jun 2020 08:35), Max: 99.4 (01 Jun 2020 15:56)  HR: 135 (02 Jun 2020 09:00) (110 - 152)  BP: 154/87 (02 Jun 2020 09:00) (110/55 - 154/87)  BP(mean): --  RR: 17 (02 Jun 2020 09:00) (16 - 19)  SpO2: 97% (02 Jun 2020 09:00) (93% - 98%)  Daily Height in cm: 157.48 (01 Jun 2020 15:39)    Daily       HEENT:   PERRL, EOMI	  CVS: S1 S2, irregular , tachycardic  No JVD,  No edema  Pulm: Lungs clear to auscultation	  GI:   Non-tender, + BS	  Ext: No LE edema  Vascular: Peripheral pulses palpable  Neurologic: A&O x 2  Skin: No rash or lesion       	  LABS:	                         13.6   9.37  )-----------( 195      ( 02 Jun 2020 07:28 )             42.0     06-02    143  |  110<H>  |  22  ----------------------------<  100<H>  4.2   |  24  |  0.94    Ca    10.1      02 Jun 2020 07:28    TPro  6.4  /  Alb  3.1<L>  /  TBili  0.2  /  DBili  x   /  AST  17  /  ALT  9<L>  /  AlkPhos  98  06-01    proBNP: Serum Pro-Brain Natriuretic Peptide: 5251 pg/mL (06-01 @ 16:03)    Lipid Profile:   HgA1c:   TSH: 	      EKG: < from: 12 Lead ECG (06.01.20 @ 15:48) >  Ventricular Rate 125 BPM    Atrial Rate 300 BPM    QRS Duration 100 ms    Q-T Interval 316 ms    QTC Calculation(Bezet) 456 ms    R Axis -13 degrees    T Axis 136 degrees    Diagnosis Line Atrial fibrillation with rapid ventricular response  Nonspecific ST and T wave abnormality , probably digitalis effect      Telemetry: atrial fibrillation     Echo: < from: LINDA w/Doppler (01.17.19 @ 13:33) >  Left ventricular hypertrophy presentThe left ventricular wall motion is   normal.The left ventricular ejection fraction is estimated to be   65-70%The   left atrium is dilated.No clot seen in the left atrium or in the left   atrial   appendage.Left atrial appendage emptying velocities are normal.Mild   spontaneous echo contrast in left atrial cavity and left atrial   appendage.   The right atrium is dilated.There is mild to moderate aortic valve   thickening.There is moderate aortic regurgitation.There is mild mitral   valve   thickening.Thereis moderate mitral regurgitation.Structurally normal   tricuspid valve.There is mild to moderate tricuspid   regurgitation.Structurally   normal pulmonic valve.No aortic root dilatation.Mild atherosclerotic   plaque(s)   in the aortic arch.Mild atherosclerotic plaque(s) in the descending   aorta.

## 2020-06-02 NOTE — DISCHARGE NOTE PROVIDER - HOSPITAL COURSE
89 y/o F with h/o Afib (on Eliquis/ Toprol), s/p DCCVin 2019,  dementia, hypothyroidism, GERD presented to her PMDs office where she was noted to be in rapid Afib and subsequently sent to the ED. Upon arrival to the ED, pt was asymptomatic. According to patient's daughter, she initially presented to her PMD secondary to a sore on her R calf. Pt denies any chest pain, SOB, dizziness, or syncope. ECG revealed Afib with ventricular  rate of 125 bpm. Pt was given Lopressor 5 mg IV x 2 with improvement of HR to 100's-110's.  HR did intermittently but briefly  jump to 120's-130's.  Labs were as follows: Troponin #1 < 0.0.;  BNP 5251; COVID negative. According to patient's chart, her family acknowledges that she is compliant with her medications. In light of her risk factors and symptoms, patient is admitted for further evaluation and management.        EP team consulted. As per EP recs, patient did not require LINDA as speaking with her daughter and patient, she is compliant with Eliquis. Patient underwent DCCV and converted to sinus rhythm. After cardioversion, patient in sinus bradycardia, rates in 50s.        Patient seen and examined at bedside. Vital signs stable. Patient asymptomatic at this time. Case discussed with Dr. Davila and patient's daughter. Patient stable for discharged. Explained to daughter of increased reoccurrence of atrial fibrillation. Patient to be started on Toprol XL 50 mg to continue to control her heart rate and Amiodarone 200 mg for rhythm control. Patient to continue Eliquis 5mg twice daily for anticoagulation. Patient to follow-up with Dr. Kitchen, her cardiologist, in 1 week. Patient and daughter agree to the above plan without any questions at this time.

## 2020-06-02 NOTE — CONSULT NOTE ADULT - ASSESSMENT
89 y/o F with history of  Afib (on Eliquis/ Toprol), s/p DCCV in 2019,  dementia, hypothyroidism, GERD presented to her PMDs office where she was noted to be in rapid Afib and subsequently sent to the ED. Will plan for DCCV today, and start Amiodarone 200 mg daily. Patient's daughter confirmed that patient has been getting all her medications daily. There was no interruption with Eliquis, there for will ot do LINDA today. She can follow up with her cardiologist Dr. Marmolejo.

## 2020-06-02 NOTE — DISCHARGE NOTE PROVIDER - NSDCCPCAREPLAN_GEN_ALL_CORE_FT
PRINCIPAL DISCHARGE DIAGNOSIS  Diagnosis: Atrial fibrillation with rapid ventricular response  Assessment and Plan of Treatment: Atrial fibrillation is very common irregular heart beat that comes from the top of the heart (atrium). Atrial fibrillation usually results in the heart rate going fast (greater than 100 beats per minute) which causes many different symptoms and can even increase the risk of a stroke. The electricity of the heart is very complicated and atrial fibrillation can be thought of as an “electrical short circuit” requiring medications to control it, relieve symptoms and prevent a stroke from    During your admission, you underwent a direct cardioversion which works to shock your heart back into a normal rhythm. You were started on Toprol XL 50 mg to help control your heart rate. You were also started on AMIODARONE 200 mg to help control the rhythm. Please continue to take your ELIQUIS 5 mg TWICE DAILY. Please follow-up with your cardiologist. Dr. Castillo in 1 week for further management. If you feel short of breath, palpitations, a feeling like your heart is beating increasingly fast, chest pain, or faint, please follow up with your doctor and go to the nearest Emergency Room ASAP.

## 2020-06-02 NOTE — DISCHARGE NOTE PROVIDER - NSDCQMCOGNITION_NEU_ALL_CORE
I will STOP taking the medications listed below when I get home from the hospital:  None
Difficulty remembering

## 2020-06-02 NOTE — DISCHARGE NOTE PROVIDER - NSDCMRMEDTOKEN_GEN_ALL_CORE_FT
amiodarone 200 mg oral tablet: 1 tab(s) orally once a day   apixaban 5 mg oral tablet: Take ELIQUIS 10mg (two 5mg tabs) twice a day for 7 days, then ELIQUIS 5mg (one tab) twice a day  Dexilant 60 mg oral delayed release capsule: 1 cap(s) orally once a day  levothyroxine 75 mcg (0.075 mg) oral tablet: 1 tab(s) orally once a day   Namenda 10 mg oral tablet: 1 tab(s) orally 2 times a day  pantoprazole 40 mg oral delayed release tablet: 1 tab(s) orally once a day (before a meal)  Toprol-XL 50 mg oral tablet, extended release: 1 tab(s) orally once a day apixaban 5 mg oral tablet: Take ELIQUIS 10mg (two 5mg tabs) twice a day for 7 days, then ELIQUIS 5mg (one tab) twice a day  Dexilant 60 mg oral delayed release capsule: 1 cap(s) orally once a day  levothyroxine 75 mcg (0.075 mg) oral tablet: 1 tab(s) orally once a day   Namenda 10 mg oral tablet: 1 tab(s) orally 2 times a day  pantoprazole 40 mg oral delayed release tablet: 1 tab(s) orally once a day (before a meal)

## 2020-06-02 NOTE — DISCHARGE NOTE PROVIDER - CARE PROVIDER_API CALL
Larry Marmolejo  INTERNAL MEDICINE   [address] [place] [id number]  Phone: [phone]  Fax: [phone]  Follow Up Time: 1 week

## 2020-06-03 ENCOUNTER — TRANSCRIPTION ENCOUNTER (OUTPATIENT)
Age: 85
End: 2020-06-03

## 2020-06-03 VITALS — TEMPERATURE: 98 F

## 2020-06-03 PROCEDURE — 83880 ASSAY OF NATRIURETIC PEPTIDE: CPT

## 2020-06-03 PROCEDURE — 85025 COMPLETE CBC W/AUTO DIFF WBC: CPT

## 2020-06-03 PROCEDURE — 36415 COLL VENOUS BLD VENIPUNCTURE: CPT

## 2020-06-03 PROCEDURE — 93306 TTE W/DOPPLER COMPLETE: CPT

## 2020-06-03 PROCEDURE — 99291 CRITICAL CARE FIRST HOUR: CPT | Mod: 25

## 2020-06-03 PROCEDURE — 80053 COMPREHEN METABOLIC PANEL: CPT

## 2020-06-03 PROCEDURE — 96376 TX/PRO/DX INJ SAME DRUG ADON: CPT

## 2020-06-03 PROCEDURE — 96374 THER/PROPH/DIAG INJ IV PUSH: CPT

## 2020-06-03 PROCEDURE — 80048 BASIC METABOLIC PNL TOTAL CA: CPT

## 2020-06-03 PROCEDURE — 85610 PROTHROMBIN TIME: CPT

## 2020-06-03 PROCEDURE — 93005 ELECTROCARDIOGRAM TRACING: CPT

## 2020-06-03 PROCEDURE — 85027 COMPLETE CBC AUTOMATED: CPT

## 2020-06-03 PROCEDURE — 80061 LIPID PANEL: CPT

## 2020-06-03 PROCEDURE — 87635 SARS-COV-2 COVID-19 AMP PRB: CPT

## 2020-06-03 PROCEDURE — 71045 X-RAY EXAM CHEST 1 VIEW: CPT

## 2020-06-03 PROCEDURE — 84484 ASSAY OF TROPONIN QUANT: CPT

## 2020-06-03 PROCEDURE — 85730 THROMBOPLASTIN TIME PARTIAL: CPT

## 2020-06-03 RX ORDER — AMIODARONE HYDROCHLORIDE 400 MG/1
1 TABLET ORAL
Qty: 30 | Refills: 3
Start: 2020-06-03 | End: 2020-09-30

## 2020-06-03 RX ORDER — METOPROLOL TARTRATE 50 MG
1 TABLET ORAL
Qty: 30 | Refills: 3
Start: 2020-06-03 | End: 2020-09-30

## 2020-06-03 RX ADMIN — APIXABAN 5 MILLIGRAM(S): 2.5 TABLET, FILM COATED ORAL at 05:49

## 2020-06-03 RX ADMIN — Medication 50 MILLIGRAM(S): at 05:49

## 2020-06-03 RX ADMIN — PANTOPRAZOLE SODIUM 40 MILLIGRAM(S): 20 TABLET, DELAYED RELEASE ORAL at 05:49

## 2020-06-03 RX ADMIN — Medication 75 MICROGRAM(S): at 05:49

## 2020-06-03 RX ADMIN — MEMANTINE HYDROCHLORIDE 10 MILLIGRAM(S): 10 TABLET ORAL at 09:41

## 2020-06-03 RX ADMIN — AMIODARONE HYDROCHLORIDE 200 MILLIGRAM(S): 400 TABLET ORAL at 05:49

## 2020-06-03 NOTE — DISCHARGE NOTE NURSING/CASE MANAGEMENT/SOCIAL WORK - PATIENT PORTAL LINK FT
You can access the FollowMyHealth Patient Portal offered by [facility] by registering at the following website: [email or web address]. By joining [facility]’s FollowMyHealth portal, you will also be able to view your health information using other applications (apps) compatible with our system.

## 2020-06-15 DIAGNOSIS — F03.90 UNSPECIFIED DEMENTIA WITHOUT BEHAVIORAL DISTURBANCE: ICD-10-CM

## 2020-06-15 DIAGNOSIS — K21.9 GASTRO-ESOPHAGEAL REFLUX DISEASE WITHOUT ESOPHAGITIS: ICD-10-CM

## 2020-06-15 DIAGNOSIS — Z96.642 PRESENCE OF LEFT ARTIFICIAL HIP JOINT: ICD-10-CM

## 2020-06-15 DIAGNOSIS — E03.9 HYPOTHYROIDISM, UNSPECIFIED: ICD-10-CM

## 2020-06-15 DIAGNOSIS — I48.0 PAROXYSMAL ATRIAL FIBRILLATION: ICD-10-CM

## 2020-06-15 DIAGNOSIS — R00.1 BRADYCARDIA, UNSPECIFIED: ICD-10-CM

## 2020-06-15 DIAGNOSIS — Z79.01 LONG TERM (CURRENT) USE OF ANTICOAGULANTS: ICD-10-CM

## 2020-06-15 DIAGNOSIS — I48.91 UNSPECIFIED ATRIAL FIBRILLATION: ICD-10-CM

## 2020-06-29 NOTE — CONSULT NOTE ADULT - CONSULT REASON
atrial fibrillation with a rapid ventricular rate
Normal rate, regular rhythm.  Heart sounds S1, S2.  No murmurs, rubs or gallops.

## 2020-07-17 ENCOUNTER — APPOINTMENT (OUTPATIENT)
Dept: HEART AND VASCULAR | Facility: CLINIC | Age: 85
End: 2020-07-17

## 2020-09-28 NOTE — ED ADULT TRIAGE NOTE - NS ED NOTE AC HIGH RISK COUNTRIES
No
Prophylactic measure.  - Enoxaparin 40 milligrams SubQ every 24 hours.
Prophylactic measure.  - Enoxaparin 40 milligrams SubQ every 24 hours
D: Regular, Kosher  E: Replete K<4 and Mg <2  DVT: SCDs  GI: None  IVF: LR at 100cc/h maintenance  Code: FULL CODE
Prophylactic measure.  - Enoxaparin 40 milligrams SubQ every 24 hours
Prophylactic measure. Plan: D: Regular, Kosher  E: Replete K<4 and Mg <2  DVT: SCDs  GI: None  IVF: LR at 100cc/h maintenance  Code: FULL CODE.

## 2020-10-06 NOTE — DISCHARGE NOTE PROVIDER - NSDCDCMDCOMP_GEN_ALL_CORE
COVID-19 Screening:    • Does the patient OR patient’s household members have any of the following symptoms?  o Temperature: Fever ?100.0°F or ?37.8°C?  No  o Respiratory symptoms: New or worsening cough, shortness of breath, difficulty breathing, or sore throat? No  o GI symptoms: New onset of nausea, vomiting or diarrhea?  No  o Miscellaneous: New onset of loss of taste or smell, chills, repeated shaking with chills, muscle pain, headache, congestion or runny nose?  No  • Has the patient or a household member tested positive for COVID-19 in the last 14 days?  No  • Has the patient or a household member been tested for COVID-19 and are waiting for the results?  No    Pre registered      This document is complete and the patient is ready for discharge.

## 2021-01-01 ENCOUNTER — RX RENEWAL (OUTPATIENT)
Age: 86
End: 2021-01-01

## 2021-01-01 ENCOUNTER — INPATIENT (INPATIENT)
Facility: HOSPITAL | Age: 86
LOS: 6 days | DRG: 871 | End: 2021-10-10
Attending: INTERNAL MEDICINE | Admitting: INTERNAL MEDICINE
Payer: MEDICARE

## 2021-01-01 VITALS
HEIGHT: 62 IN | WEIGHT: 149.91 LBS | OXYGEN SATURATION: 98 % | DIASTOLIC BLOOD PRESSURE: 89 MMHG | RESPIRATION RATE: 19 BRPM | HEART RATE: 91 BPM | SYSTOLIC BLOOD PRESSURE: 167 MMHG | TEMPERATURE: 98 F

## 2021-01-01 VITALS — RESPIRATION RATE: 19 BRPM | HEART RATE: 106 BPM | OXYGEN SATURATION: 94 %

## 2021-01-01 DIAGNOSIS — E03.9 HYPOTHYROIDISM, UNSPECIFIED: ICD-10-CM

## 2021-01-01 DIAGNOSIS — Z96.642 PRESENCE OF LEFT ARTIFICIAL HIP JOINT: Chronic | ICD-10-CM

## 2021-01-01 DIAGNOSIS — J90 PLEURAL EFFUSION, NOT ELSEWHERE CLASSIFIED: ICD-10-CM

## 2021-01-01 DIAGNOSIS — J96.01 ACUTE RESPIRATORY FAILURE WITH HYPOXIA: ICD-10-CM

## 2021-01-01 DIAGNOSIS — F03.90 UNSPECIFIED DEMENTIA WITHOUT BEHAVIORAL DISTURBANCE: ICD-10-CM

## 2021-01-01 DIAGNOSIS — Z51.5 ENCOUNTER FOR PALLIATIVE CARE: ICD-10-CM

## 2021-01-01 DIAGNOSIS — G93.40 ENCEPHALOPATHY, UNSPECIFIED: ICD-10-CM

## 2021-01-01 DIAGNOSIS — A41.9 SEPSIS, UNSPECIFIED ORGANISM: ICD-10-CM

## 2021-01-01 DIAGNOSIS — F41.9 ANXIETY DISORDER, UNSPECIFIED: ICD-10-CM

## 2021-01-01 DIAGNOSIS — I48.91 UNSPECIFIED ATRIAL FIBRILLATION: ICD-10-CM

## 2021-01-01 DIAGNOSIS — K21.9 GASTRO-ESOPHAGEAL REFLUX DISEASE WITHOUT ESOPHAGITIS: ICD-10-CM

## 2021-01-01 DIAGNOSIS — R53.81 OTHER MALAISE: ICD-10-CM

## 2021-01-01 DIAGNOSIS — E78.5 HYPERLIPIDEMIA, UNSPECIFIED: ICD-10-CM

## 2021-01-01 DIAGNOSIS — R63.8 OTHER SYMPTOMS AND SIGNS CONCERNING FOOD AND FLUID INTAKE: ICD-10-CM

## 2021-01-01 LAB
ALBUMIN SERPL ELPH-MCNC: 1.8 G/DL — LOW (ref 3.3–5)
ALBUMIN SERPL ELPH-MCNC: 2.1 G/DL — LOW (ref 3.3–5)
ALBUMIN SERPL ELPH-MCNC: 3.1 G/DL — LOW (ref 3.3–5)
ALP SERPL-CCNC: 109 U/L — SIGNIFICANT CHANGE UP (ref 40–120)
ALP SERPL-CCNC: 92 U/L — SIGNIFICANT CHANGE UP (ref 40–120)
ALP SERPL-CCNC: 95 U/L — SIGNIFICANT CHANGE UP (ref 40–120)
ALT FLD-CCNC: 18 U/L — SIGNIFICANT CHANGE UP (ref 10–45)
ALT FLD-CCNC: 21 U/L — SIGNIFICANT CHANGE UP (ref 10–45)
ALT FLD-CCNC: 23 U/L — SIGNIFICANT CHANGE UP (ref 10–45)
ANION GAP SERPL CALC-SCNC: 11 MMOL/L — SIGNIFICANT CHANGE UP (ref 5–17)
ANION GAP SERPL CALC-SCNC: 11 MMOL/L — SIGNIFICANT CHANGE UP (ref 5–17)
ANION GAP SERPL CALC-SCNC: 12 MMOL/L — SIGNIFICANT CHANGE UP (ref 5–17)
ANION GAP SERPL CALC-SCNC: 12 MMOL/L — SIGNIFICANT CHANGE UP (ref 5–17)
ANION GAP SERPL CALC-SCNC: 13 MMOL/L — SIGNIFICANT CHANGE UP (ref 5–17)
ANION GAP SERPL CALC-SCNC: 13 MMOL/L — SIGNIFICANT CHANGE UP (ref 5–17)
ANION GAP SERPL CALC-SCNC: 8 MMOL/L — SIGNIFICANT CHANGE UP (ref 5–17)
ANISOCYTOSIS BLD QL: SLIGHT — SIGNIFICANT CHANGE UP
ANISOCYTOSIS BLD QL: SLIGHT — SIGNIFICANT CHANGE UP
APPEARANCE UR: CLEAR — SIGNIFICANT CHANGE UP
APTT BLD: 32.9 SEC — SIGNIFICANT CHANGE UP (ref 27.5–35.5)
AST SERPL-CCNC: 43 U/L — HIGH (ref 10–40)
AST SERPL-CCNC: 45 U/L — HIGH (ref 10–40)
AST SERPL-CCNC: 54 U/L — HIGH (ref 10–40)
BACTERIA # UR AUTO: PRESENT /HPF
BASE EXCESS BLDA CALC-SCNC: 4.1 MMOL/L — HIGH (ref -2–3)
BASE EXCESS BLDV CALC-SCNC: -0.8 MMOL/L — SIGNIFICANT CHANGE UP (ref -2–3)
BASOPHILS # BLD AUTO: 0 K/UL — SIGNIFICANT CHANGE UP (ref 0–0.2)
BASOPHILS # BLD AUTO: 0 K/UL — SIGNIFICANT CHANGE UP (ref 0–0.2)
BASOPHILS # BLD AUTO: 0.02 K/UL — SIGNIFICANT CHANGE UP (ref 0–0.2)
BASOPHILS # BLD AUTO: 0.02 K/UL — SIGNIFICANT CHANGE UP (ref 0–0.2)
BASOPHILS # BLD AUTO: 0.05 K/UL — SIGNIFICANT CHANGE UP (ref 0–0.2)
BASOPHILS # BLD AUTO: 0.07 K/UL — SIGNIFICANT CHANGE UP (ref 0–0.2)
BASOPHILS NFR BLD AUTO: 0 % — SIGNIFICANT CHANGE UP (ref 0–2)
BASOPHILS NFR BLD AUTO: 0 % — SIGNIFICANT CHANGE UP (ref 0–2)
BASOPHILS NFR BLD AUTO: 0.1 % — SIGNIFICANT CHANGE UP (ref 0–2)
BASOPHILS NFR BLD AUTO: 0.2 % — SIGNIFICANT CHANGE UP (ref 0–2)
BASOPHILS NFR BLD AUTO: 0.3 % — SIGNIFICANT CHANGE UP (ref 0–2)
BASOPHILS NFR BLD AUTO: 0.4 % — SIGNIFICANT CHANGE UP (ref 0–2)
BILIRUB SERPL-MCNC: 0.5 MG/DL — SIGNIFICANT CHANGE UP (ref 0.2–1.2)
BILIRUB SERPL-MCNC: 0.7 MG/DL — SIGNIFICANT CHANGE UP (ref 0.2–1.2)
BILIRUB SERPL-MCNC: 0.8 MG/DL — SIGNIFICANT CHANGE UP (ref 0.2–1.2)
BILIRUB UR-MCNC: ABNORMAL
BUN SERPL-MCNC: 19 MG/DL — SIGNIFICANT CHANGE UP (ref 7–23)
BUN SERPL-MCNC: 21 MG/DL — SIGNIFICANT CHANGE UP (ref 7–23)
BUN SERPL-MCNC: 26 MG/DL — HIGH (ref 7–23)
BUN SERPL-MCNC: 26 MG/DL — HIGH (ref 7–23)
BUN SERPL-MCNC: 28 MG/DL — HIGH (ref 7–23)
BUN SERPL-MCNC: 29 MG/DL — HIGH (ref 7–23)
BUN SERPL-MCNC: 29 MG/DL — HIGH (ref 7–23)
BURR CELLS BLD QL SMEAR: PRESENT — SIGNIFICANT CHANGE UP
CA-I SERPL-SCNC: 1.36 MMOL/L — HIGH (ref 1.15–1.33)
CA-I SERPL-SCNC: SIGNIFICANT CHANGE UP MMOL/L (ref 1.15–1.33)
CALCIUM SERPL-MCNC: 10.1 MG/DL — SIGNIFICANT CHANGE UP (ref 8.4–10.5)
CALCIUM SERPL-MCNC: 10.2 MG/DL — SIGNIFICANT CHANGE UP (ref 8.4–10.5)
CALCIUM SERPL-MCNC: 10.3 MG/DL — SIGNIFICANT CHANGE UP (ref 8.4–10.5)
CALCIUM SERPL-MCNC: 10.7 MG/DL — HIGH (ref 8.4–10.5)
CALCIUM SERPL-MCNC: 9.7 MG/DL — SIGNIFICANT CHANGE UP (ref 8.4–10.5)
CALCIUM SERPL-MCNC: 9.8 MG/DL — SIGNIFICANT CHANGE UP (ref 8.4–10.5)
CALCIUM SERPL-MCNC: 9.8 MG/DL — SIGNIFICANT CHANGE UP (ref 8.4–10.5)
CHLORIDE SERPL-SCNC: 103 MMOL/L — SIGNIFICANT CHANGE UP (ref 96–108)
CHLORIDE SERPL-SCNC: 104 MMOL/L — SIGNIFICANT CHANGE UP (ref 96–108)
CHLORIDE SERPL-SCNC: 105 MMOL/L — SIGNIFICANT CHANGE UP (ref 96–108)
CHLORIDE SERPL-SCNC: 105 MMOL/L — SIGNIFICANT CHANGE UP (ref 96–108)
CHLORIDE SERPL-SCNC: 106 MMOL/L — SIGNIFICANT CHANGE UP (ref 96–108)
CHLORIDE SERPL-SCNC: 106 MMOL/L — SIGNIFICANT CHANGE UP (ref 96–108)
CHLORIDE SERPL-SCNC: 108 MMOL/L — SIGNIFICANT CHANGE UP (ref 96–108)
CO2 BLDA-SCNC: 30 MMOL/L — HIGH (ref 19–24)
CO2 BLDV-SCNC: 25.6 MMOL/L — SIGNIFICANT CHANGE UP (ref 22–26)
CO2 SERPL-SCNC: 21 MMOL/L — LOW (ref 22–31)
CO2 SERPL-SCNC: 21 MMOL/L — LOW (ref 22–31)
CO2 SERPL-SCNC: 23 MMOL/L — SIGNIFICANT CHANGE UP (ref 22–31)
CO2 SERPL-SCNC: 23 MMOL/L — SIGNIFICANT CHANGE UP (ref 22–31)
CO2 SERPL-SCNC: 26 MMOL/L — SIGNIFICANT CHANGE UP (ref 22–31)
CO2 SERPL-SCNC: 26 MMOL/L — SIGNIFICANT CHANGE UP (ref 22–31)
CO2 SERPL-SCNC: 28 MMOL/L — SIGNIFICANT CHANGE UP (ref 22–31)
COLOR SPEC: YELLOW — SIGNIFICANT CHANGE UP
COMMENT - URINE: SIGNIFICANT CHANGE UP
COVID-19 SPIKE DOMAIN AB INTERP: POSITIVE
COVID-19 SPIKE DOMAIN ANTIBODY RESULT: >250 U/ML — HIGH
CREAT SERPL-MCNC: 0.69 MG/DL — SIGNIFICANT CHANGE UP (ref 0.5–1.3)
CREAT SERPL-MCNC: 0.71 MG/DL — SIGNIFICANT CHANGE UP (ref 0.5–1.3)
CREAT SERPL-MCNC: 0.73 MG/DL — SIGNIFICANT CHANGE UP (ref 0.5–1.3)
CREAT SERPL-MCNC: 0.76 MG/DL — SIGNIFICANT CHANGE UP (ref 0.5–1.3)
CREAT SERPL-MCNC: 0.8 MG/DL — SIGNIFICANT CHANGE UP (ref 0.5–1.3)
CREAT SERPL-MCNC: 0.83 MG/DL — SIGNIFICANT CHANGE UP (ref 0.5–1.3)
CREAT SERPL-MCNC: 0.98 MG/DL — SIGNIFICANT CHANGE UP (ref 0.5–1.3)
CRYOGLOB SERPL-MCNC: NEGATIVE — SIGNIFICANT CHANGE UP
CULTURE RESULTS: NO GROWTH — SIGNIFICANT CHANGE UP
CULTURE RESULTS: SIGNIFICANT CHANGE UP
CULTURE RESULTS: SIGNIFICANT CHANGE UP
DIFF PNL FLD: ABNORMAL
EOSINOPHIL # BLD AUTO: 0 K/UL — SIGNIFICANT CHANGE UP (ref 0–0.5)
EOSINOPHIL # BLD AUTO: 0.01 K/UL — SIGNIFICANT CHANGE UP (ref 0–0.5)
EOSINOPHIL # BLD AUTO: 0.02 K/UL — SIGNIFICANT CHANGE UP (ref 0–0.5)
EOSINOPHIL # BLD AUTO: 0.03 K/UL — SIGNIFICANT CHANGE UP (ref 0–0.5)
EOSINOPHIL # BLD AUTO: 0.09 K/UL — SIGNIFICANT CHANGE UP (ref 0–0.5)
EOSINOPHIL # BLD AUTO: 0.1 K/UL — SIGNIFICANT CHANGE UP (ref 0–0.5)
EOSINOPHIL NFR BLD AUTO: 0 % — SIGNIFICANT CHANGE UP (ref 0–6)
EOSINOPHIL NFR BLD AUTO: 0.1 % — SIGNIFICANT CHANGE UP (ref 0–6)
EOSINOPHIL NFR BLD AUTO: 0.1 % — SIGNIFICANT CHANGE UP (ref 0–6)
EOSINOPHIL NFR BLD AUTO: 0.2 % — SIGNIFICANT CHANGE UP (ref 0–6)
EOSINOPHIL NFR BLD AUTO: 0.7 % — SIGNIFICANT CHANGE UP (ref 0–6)
EOSINOPHIL NFR BLD AUTO: 0.9 % — SIGNIFICANT CHANGE UP (ref 0–6)
EPI CELLS # UR: SIGNIFICANT CHANGE UP /HPF (ref 0–5)
GAS PNL BLDV: 136 MMOL/L — SIGNIFICANT CHANGE UP (ref 136–145)
GAS PNL BLDV: SIGNIFICANT CHANGE UP
GAS PNL BLDV: SIGNIFICANT CHANGE UP
GAS PNL BLDV: SIGNIFICANT CHANGE UP MMOL/L (ref 136–145)
GIANT PLATELETS BLD QL SMEAR: PRESENT — SIGNIFICANT CHANGE UP
GIANT PLATELETS BLD QL SMEAR: PRESENT — SIGNIFICANT CHANGE UP
GLUCOSE BLDC GLUCOMTR-MCNC: 90 MG/DL — SIGNIFICANT CHANGE UP (ref 70–99)
GLUCOSE SERPL-MCNC: 101 MG/DL — HIGH (ref 70–99)
GLUCOSE SERPL-MCNC: 126 MG/DL — HIGH (ref 70–99)
GLUCOSE SERPL-MCNC: 169 MG/DL — HIGH (ref 70–99)
GLUCOSE SERPL-MCNC: 81 MG/DL — SIGNIFICANT CHANGE UP (ref 70–99)
GLUCOSE SERPL-MCNC: 88 MG/DL — SIGNIFICANT CHANGE UP (ref 70–99)
GLUCOSE SERPL-MCNC: 90 MG/DL — SIGNIFICANT CHANGE UP (ref 70–99)
GLUCOSE SERPL-MCNC: 95 MG/DL — SIGNIFICANT CHANGE UP (ref 70–99)
GLUCOSE UR QL: NEGATIVE — SIGNIFICANT CHANGE UP
HCO3 BLDA-SCNC: 28 MMOL/L — SIGNIFICANT CHANGE UP (ref 21–28)
HCO3 BLDV-SCNC: 24 MMOL/L — SIGNIFICANT CHANGE UP (ref 22–29)
HCT VFR BLD CALC: 34.2 % — LOW (ref 34.5–45)
HCT VFR BLD CALC: 37.2 % — SIGNIFICANT CHANGE UP (ref 34.5–45)
HCT VFR BLD CALC: 37.8 % — SIGNIFICANT CHANGE UP (ref 34.5–45)
HCT VFR BLD CALC: 40 % — SIGNIFICANT CHANGE UP (ref 34.5–45)
HCT VFR BLD CALC: 41.8 % — SIGNIFICANT CHANGE UP (ref 34.5–45)
HCT VFR BLD CALC: 42 % — SIGNIFICANT CHANGE UP (ref 34.5–45)
HCT VFR BLD CALC: 42.7 % — SIGNIFICANT CHANGE UP (ref 34.5–45)
HGB BLD-MCNC: 11.1 G/DL — LOW (ref 11.5–15.5)
HGB BLD-MCNC: 11.7 G/DL — SIGNIFICANT CHANGE UP (ref 11.5–15.5)
HGB BLD-MCNC: 11.8 G/DL — SIGNIFICANT CHANGE UP (ref 11.5–15.5)
HGB BLD-MCNC: 13 G/DL — SIGNIFICANT CHANGE UP (ref 11.5–15.5)
HGB BLD-MCNC: 13 G/DL — SIGNIFICANT CHANGE UP (ref 11.5–15.5)
HGB BLD-MCNC: 13.2 G/DL — SIGNIFICANT CHANGE UP (ref 11.5–15.5)
HGB BLD-MCNC: 13.6 G/DL — SIGNIFICANT CHANGE UP (ref 11.5–15.5)
HYPOCHROMIA BLD QL: SLIGHT — SIGNIFICANT CHANGE UP
IMM GRANULOCYTES NFR BLD AUTO: 0.8 % — SIGNIFICANT CHANGE UP (ref 0–1.5)
IMM GRANULOCYTES NFR BLD AUTO: 0.9 % — SIGNIFICANT CHANGE UP (ref 0–1.5)
IMM GRANULOCYTES NFR BLD AUTO: 1 % — SIGNIFICANT CHANGE UP (ref 0–1.5)
IMM GRANULOCYTES NFR BLD AUTO: 1.1 % — SIGNIFICANT CHANGE UP (ref 0–1.5)
INR BLD: 1.93 — HIGH (ref 0.88–1.16)
KAPPA LC SER QL IFE: 4.28 MG/DL — HIGH (ref 0.33–1.94)
KAPPA/LAMBDA FREE LIGHT CHAIN RATIO, SERUM: 2.18 RATIO — HIGH (ref 0.26–1.65)
KETONES UR-MCNC: ABNORMAL MG/DL
LACTATE SERPL-SCNC: 1.6 MMOL/L — SIGNIFICANT CHANGE UP (ref 0.5–2)
LACTATE SERPL-SCNC: 1.7 MMOL/L — SIGNIFICANT CHANGE UP (ref 0.5–2)
LAMBDA LC SER QL IFE: 1.96 MG/DL — SIGNIFICANT CHANGE UP (ref 0.57–2.63)
LEGIONELLA AG UR QL: NEGATIVE — SIGNIFICANT CHANGE UP
LEUKOCYTE ESTERASE UR-ACNC: NEGATIVE — SIGNIFICANT CHANGE UP
LYMPHOCYTES # BLD AUTO: 0.09 K/UL — LOW (ref 1–3.3)
LYMPHOCYTES # BLD AUTO: 0.49 K/UL — LOW (ref 1–3.3)
LYMPHOCYTES # BLD AUTO: 0.53 K/UL — LOW (ref 1–3.3)
LYMPHOCYTES # BLD AUTO: 0.85 K/UL — LOW (ref 1–3.3)
LYMPHOCYTES # BLD AUTO: 0.85 K/UL — LOW (ref 1–3.3)
LYMPHOCYTES # BLD AUTO: 0.9 % — LOW (ref 13–44)
LYMPHOCYTES # BLD AUTO: 1.02 K/UL — SIGNIFICANT CHANGE UP (ref 1–3.3)
LYMPHOCYTES # BLD AUTO: 3.6 % — LOW (ref 13–44)
LYMPHOCYTES # BLD AUTO: 4.9 % — LOW (ref 13–44)
LYMPHOCYTES # BLD AUTO: 5.3 % — LOW (ref 13–44)
LYMPHOCYTES # BLD AUTO: 5.9 % — LOW (ref 13–44)
LYMPHOCYTES # BLD AUTO: 6.3 % — LOW (ref 13–44)
MACROCYTES BLD QL: SLIGHT — SIGNIFICANT CHANGE UP
MACROCYTES BLD QL: SLIGHT — SIGNIFICANT CHANGE UP
MAGNESIUM SERPL-MCNC: 1.9 MG/DL — SIGNIFICANT CHANGE UP (ref 1.6–2.6)
MAGNESIUM SERPL-MCNC: 2 MG/DL — SIGNIFICANT CHANGE UP (ref 1.6–2.6)
MANUAL SMEAR VERIFICATION: SIGNIFICANT CHANGE UP
MANUAL SMEAR VERIFICATION: SIGNIFICANT CHANGE UP
MCHC RBC-ENTMCNC: 30.9 GM/DL — LOW (ref 32–36)
MCHC RBC-ENTMCNC: 31 GM/DL — LOW (ref 32–36)
MCHC RBC-ENTMCNC: 31.2 GM/DL — LOW (ref 32–36)
MCHC RBC-ENTMCNC: 31.5 GM/DL — LOW (ref 32–36)
MCHC RBC-ENTMCNC: 32.1 PG — SIGNIFICANT CHANGE UP (ref 27–34)
MCHC RBC-ENTMCNC: 32.4 PG — SIGNIFICANT CHANGE UP (ref 27–34)
MCHC RBC-ENTMCNC: 32.5 GM/DL — SIGNIFICANT CHANGE UP (ref 32–36)
MCHC RBC-ENTMCNC: 32.7 PG — SIGNIFICANT CHANGE UP (ref 27–34)
MCHC RBC-ENTMCNC: 33 PG — SIGNIFICANT CHANGE UP (ref 27–34)
MCHC RBC-ENTMCNC: 33.5 PG — SIGNIFICANT CHANGE UP (ref 27–34)
MCHC RBC-ENTMCNC: 33.5 PG — SIGNIFICANT CHANGE UP (ref 27–34)
MCHC RBC-ENTMCNC: 33.9 PG — SIGNIFICANT CHANGE UP (ref 27–34)
MCV RBC AUTO: 101.8 FL — HIGH (ref 80–100)
MCV RBC AUTO: 102.7 FL — HIGH (ref 80–100)
MCV RBC AUTO: 103 FL — HIGH (ref 80–100)
MCV RBC AUTO: 103 FL — HIGH (ref 80–100)
MCV RBC AUTO: 103.1 FL — HIGH (ref 80–100)
MCV RBC AUTO: 105.7 FL — HIGH (ref 80–100)
MCV RBC AUTO: 109.7 FL — HIGH (ref 80–100)
MONOCYTES # BLD AUTO: 0.56 K/UL — SIGNIFICANT CHANGE UP (ref 0–0.9)
MONOCYTES # BLD AUTO: 0.65 K/UL — SIGNIFICANT CHANGE UP (ref 0–0.9)
MONOCYTES # BLD AUTO: 0.73 K/UL — SIGNIFICANT CHANGE UP (ref 0–0.9)
MONOCYTES # BLD AUTO: 0.92 K/UL — HIGH (ref 0–0.9)
MONOCYTES # BLD AUTO: 1.05 K/UL — HIGH (ref 0–0.9)
MONOCYTES # BLD AUTO: 1.16 K/UL — HIGH (ref 0–0.9)
MONOCYTES NFR BLD AUTO: 3.5 % — SIGNIFICANT CHANGE UP (ref 2–14)
MONOCYTES NFR BLD AUTO: 6.1 % — SIGNIFICANT CHANGE UP (ref 2–14)
MONOCYTES NFR BLD AUTO: 6.4 % — SIGNIFICANT CHANGE UP (ref 2–14)
MONOCYTES NFR BLD AUTO: 6.5 % — SIGNIFICANT CHANGE UP (ref 2–14)
MONOCYTES NFR BLD AUTO: 7 % — SIGNIFICANT CHANGE UP (ref 2–14)
MONOCYTES NFR BLD AUTO: 8.4 % — SIGNIFICANT CHANGE UP (ref 2–14)
MRSA PCR RESULT.: NEGATIVE — SIGNIFICANT CHANGE UP
NEUTROPHILS # BLD AUTO: 11.93 K/UL — HIGH (ref 1.8–7.4)
NEUTROPHILS # BLD AUTO: 12.27 K/UL — HIGH (ref 1.8–7.4)
NEUTROPHILS # BLD AUTO: 13.79 K/UL — HIGH (ref 1.8–7.4)
NEUTROPHILS # BLD AUTO: 14.59 K/UL — HIGH (ref 1.8–7.4)
NEUTROPHILS # BLD AUTO: 9.42 K/UL — HIGH (ref 1.8–7.4)
NEUTROPHILS # BLD AUTO: 9.53 K/UL — HIGH (ref 1.8–7.4)
NEUTROPHILS NFR BLD AUTO: 85.5 % — HIGH (ref 43–77)
NEUTROPHILS NFR BLD AUTO: 85.9 % — HIGH (ref 43–77)
NEUTROPHILS NFR BLD AUTO: 86.8 % — HIGH (ref 43–77)
NEUTROPHILS NFR BLD AUTO: 87.8 % — HIGH (ref 43–77)
NEUTROPHILS NFR BLD AUTO: 91.2 % — HIGH (ref 43–77)
NEUTROPHILS NFR BLD AUTO: 91.2 % — HIGH (ref 43–77)
NITRITE UR-MCNC: NEGATIVE — SIGNIFICANT CHANGE UP
NRBC # BLD: 0 /100 WBCS — SIGNIFICANT CHANGE UP (ref 0–0)
NRBC # BLD: 1 /100 — HIGH (ref 0–0)
NRBC # BLD: SIGNIFICANT CHANGE UP /100 WBCS (ref 0–0)
NT-PROBNP SERPL-SCNC: HIGH PG/ML (ref 0–300)
OVALOCYTES BLD QL SMEAR: SLIGHT — SIGNIFICANT CHANGE UP
OVALOCYTES BLD QL SMEAR: SLIGHT — SIGNIFICANT CHANGE UP
PCO2 BLDA: 41 MMHG — HIGH (ref 32–35)
PCO2 BLDV: 41 MMHG — SIGNIFICANT CHANGE UP (ref 39–42)
PCO2 BLDV: SIGNIFICANT CHANGE UP MMHG (ref 39–42)
PH BLDA: 7.45 — SIGNIFICANT CHANGE UP (ref 7.35–7.45)
PH BLDV: 7.38 — SIGNIFICANT CHANGE UP (ref 7.32–7.43)
PH BLDV: SIGNIFICANT CHANGE UP (ref 7.32–7.43)
PH UR: 5.5 — SIGNIFICANT CHANGE UP (ref 5–8)
PHOSPHATE SERPL-MCNC: 1.6 MG/DL — LOW (ref 2.5–4.5)
PHOSPHATE SERPL-MCNC: 2.3 MG/DL — LOW (ref 2.5–4.5)
PHOSPHATE SERPL-MCNC: 2.4 MG/DL — LOW (ref 2.5–4.5)
PHOSPHATE SERPL-MCNC: 2.5 MG/DL — SIGNIFICANT CHANGE UP (ref 2.5–4.5)
PHOSPHATE SERPL-MCNC: 2.8 MG/DL — SIGNIFICANT CHANGE UP (ref 2.5–4.5)
PHOSPHATE SERPL-MCNC: 2.8 MG/DL — SIGNIFICANT CHANGE UP (ref 2.5–4.5)
PLAT MORPH BLD: ABNORMAL
PLAT MORPH BLD: ABNORMAL
PLATELET # BLD AUTO: 148 K/UL — LOW (ref 150–400)
PLATELET # BLD AUTO: 166 K/UL — SIGNIFICANT CHANGE UP (ref 150–400)
PLATELET # BLD AUTO: 166 K/UL — SIGNIFICANT CHANGE UP (ref 150–400)
PLATELET # BLD AUTO: 169 K/UL — SIGNIFICANT CHANGE UP (ref 150–400)
PLATELET # BLD AUTO: 178 K/UL — SIGNIFICANT CHANGE UP (ref 150–400)
PLATELET # BLD AUTO: 189 K/UL — SIGNIFICANT CHANGE UP (ref 150–400)
PLATELET # BLD AUTO: 193 K/UL — SIGNIFICANT CHANGE UP (ref 150–400)
PO2 BLDA: 62 MMHG — LOW (ref 83–108)
PO2 BLDV: <35 MMHG — SIGNIFICANT CHANGE UP (ref 25–45)
PO2 BLDV: SIGNIFICANT CHANGE UP MMHG (ref 25–45)
POIKILOCYTOSIS BLD QL AUTO: SLIGHT — SIGNIFICANT CHANGE UP
POLYCHROMASIA BLD QL SMEAR: SLIGHT — SIGNIFICANT CHANGE UP
POLYCHROMASIA BLD QL SMEAR: SLIGHT — SIGNIFICANT CHANGE UP
POTASSIUM BLDV-SCNC: 4 MMOL/L — SIGNIFICANT CHANGE UP (ref 3.5–5.1)
POTASSIUM BLDV-SCNC: SIGNIFICANT CHANGE UP MMOL/L (ref 3.5–5.1)
POTASSIUM SERPL-MCNC: 3.2 MMOL/L — LOW (ref 3.5–5.3)
POTASSIUM SERPL-MCNC: 3.3 MMOL/L — LOW (ref 3.5–5.3)
POTASSIUM SERPL-MCNC: 3.7 MMOL/L — SIGNIFICANT CHANGE UP (ref 3.5–5.3)
POTASSIUM SERPL-MCNC: 3.8 MMOL/L — SIGNIFICANT CHANGE UP (ref 3.5–5.3)
POTASSIUM SERPL-MCNC: 3.9 MMOL/L — SIGNIFICANT CHANGE UP (ref 3.5–5.3)
POTASSIUM SERPL-MCNC: 4 MMOL/L — SIGNIFICANT CHANGE UP (ref 3.5–5.3)
POTASSIUM SERPL-MCNC: 4 MMOL/L — SIGNIFICANT CHANGE UP (ref 3.5–5.3)
POTASSIUM SERPL-SCNC: 3.2 MMOL/L — LOW (ref 3.5–5.3)
POTASSIUM SERPL-SCNC: 3.3 MMOL/L — LOW (ref 3.5–5.3)
POTASSIUM SERPL-SCNC: 3.7 MMOL/L — SIGNIFICANT CHANGE UP (ref 3.5–5.3)
POTASSIUM SERPL-SCNC: 3.8 MMOL/L — SIGNIFICANT CHANGE UP (ref 3.5–5.3)
POTASSIUM SERPL-SCNC: 3.9 MMOL/L — SIGNIFICANT CHANGE UP (ref 3.5–5.3)
POTASSIUM SERPL-SCNC: 4 MMOL/L — SIGNIFICANT CHANGE UP (ref 3.5–5.3)
POTASSIUM SERPL-SCNC: 4 MMOL/L — SIGNIFICANT CHANGE UP (ref 3.5–5.3)
PROT SERPL-MCNC: 5.7 G/DL — LOW (ref 6–8.3)
PROT SERPL-MCNC: 6.2 G/DL — SIGNIFICANT CHANGE UP (ref 6–8.3)
PROT SERPL-MCNC: 7 G/DL — SIGNIFICANT CHANGE UP (ref 6–8.3)
PROT UR-MCNC: 100 MG/DL
PROTHROM AB SERPL-ACNC: 22.4 SEC — HIGH (ref 10.6–13.6)
RAPID RVP RESULT: SIGNIFICANT CHANGE UP
RBC # BLD: 3.36 M/UL — LOW (ref 3.8–5.2)
RBC # BLD: 3.61 M/UL — LOW (ref 3.8–5.2)
RBC # BLD: 3.68 M/UL — LOW (ref 3.8–5.2)
RBC # BLD: 3.83 M/UL — SIGNIFICANT CHANGE UP (ref 3.8–5.2)
RBC # BLD: 3.88 M/UL — SIGNIFICANT CHANGE UP (ref 3.8–5.2)
RBC # BLD: 4.04 M/UL — SIGNIFICANT CHANGE UP (ref 3.8–5.2)
RBC # BLD: 4.06 M/UL — SIGNIFICANT CHANGE UP (ref 3.8–5.2)
RBC # FLD: 14.4 % — SIGNIFICANT CHANGE UP (ref 10.3–14.5)
RBC # FLD: 14.5 % — SIGNIFICANT CHANGE UP (ref 10.3–14.5)
RBC # FLD: 14.6 % — HIGH (ref 10.3–14.5)
RBC # FLD: 14.7 % — HIGH (ref 10.3–14.5)
RBC # FLD: 14.9 % — HIGH (ref 10.3–14.5)
RBC BLD AUTO: ABNORMAL
RBC BLD AUTO: ABNORMAL
RBC CASTS # UR COMP ASSIST: < 5 /HPF — SIGNIFICANT CHANGE UP
S AUREUS DNA NOSE QL NAA+PROBE: NEGATIVE — SIGNIFICANT CHANGE UP
SAO2 % BLDA: 94.4 % — SIGNIFICANT CHANGE UP (ref 94–98)
SAO2 % BLDV: 54.4 % — LOW (ref 67–88)
SAO2 % BLDV: SIGNIFICANT CHANGE UP % (ref 67–88)
SARS-COV-2 IGG+IGM SERPL QL IA: >250 U/ML — HIGH
SARS-COV-2 IGG+IGM SERPL QL IA: POSITIVE
SARS-COV-2 RNA SPEC QL NAA+PROBE: NEGATIVE — SIGNIFICANT CHANGE UP
SARS-COV-2 RNA SPEC QL NAA+PROBE: SIGNIFICANT CHANGE UP
SODIUM SERPL-SCNC: 137 MMOL/L — SIGNIFICANT CHANGE UP (ref 135–145)
SODIUM SERPL-SCNC: 139 MMOL/L — SIGNIFICANT CHANGE UP (ref 135–145)
SODIUM SERPL-SCNC: 142 MMOL/L — SIGNIFICANT CHANGE UP (ref 135–145)
SODIUM SERPL-SCNC: 144 MMOL/L — SIGNIFICANT CHANGE UP (ref 135–145)
SODIUM SERPL-SCNC: 145 MMOL/L — SIGNIFICANT CHANGE UP (ref 135–145)
SP GR SPEC: >=1.03 — SIGNIFICANT CHANGE UP (ref 1–1.03)
SPECIMEN SOURCE: SIGNIFICANT CHANGE UP
SPHEROCYTES BLD QL SMEAR: SLIGHT — SIGNIFICANT CHANGE UP
TROPONIN T SERPL-MCNC: 0.02 NG/ML — HIGH (ref 0–0.01)
UROBILINOGEN FLD QL: 1 E.U./DL — SIGNIFICANT CHANGE UP
WBC # BLD: 10.45 K/UL — SIGNIFICANT CHANGE UP (ref 3.8–10.5)
WBC # BLD: 10.73 K/UL — HIGH (ref 3.8–10.5)
WBC # BLD: 12.01 K/UL — HIGH (ref 3.8–10.5)
WBC # BLD: 13.76 K/UL — HIGH (ref 3.8–10.5)
WBC # BLD: 14.31 K/UL — HIGH (ref 3.8–10.5)
WBC # BLD: 16 K/UL — HIGH (ref 3.8–10.5)
WBC # BLD: 16.14 K/UL — HIGH (ref 3.8–10.5)
WBC # FLD AUTO: 10.45 K/UL — SIGNIFICANT CHANGE UP (ref 3.8–10.5)
WBC # FLD AUTO: 10.73 K/UL — HIGH (ref 3.8–10.5)
WBC # FLD AUTO: 12.01 K/UL — HIGH (ref 3.8–10.5)
WBC # FLD AUTO: 13.76 K/UL — HIGH (ref 3.8–10.5)
WBC # FLD AUTO: 14.31 K/UL — HIGH (ref 3.8–10.5)
WBC # FLD AUTO: 16 K/UL — HIGH (ref 3.8–10.5)
WBC # FLD AUTO: 16.14 K/UL — HIGH (ref 3.8–10.5)
WBC UR QL: > 10 /HPF

## 2021-01-01 PROCEDURE — 71045 X-RAY EXAM CHEST 1 VIEW: CPT | Mod: 26

## 2021-01-01 PROCEDURE — 93010 ELECTROCARDIOGRAM REPORT: CPT

## 2021-01-01 PROCEDURE — 99223 1ST HOSP IP/OBS HIGH 75: CPT

## 2021-01-01 PROCEDURE — 99222 1ST HOSP IP/OBS MODERATE 55: CPT | Mod: GC

## 2021-01-01 PROCEDURE — 93306 TTE W/DOPPLER COMPLETE: CPT | Mod: 26

## 2021-01-01 PROCEDURE — 99358 PROLONG SERVICE W/O CONTACT: CPT

## 2021-01-01 PROCEDURE — 71045 X-RAY EXAM CHEST 1 VIEW: CPT | Mod: 26,77

## 2021-01-01 PROCEDURE — 99285 EMERGENCY DEPT VISIT HI MDM: CPT | Mod: CS

## 2021-01-01 PROCEDURE — 99232 SBSQ HOSP IP/OBS MODERATE 35: CPT | Mod: GC

## 2021-01-01 RX ORDER — MIRTAZAPINE 45 MG/1
7.5 TABLET, ORALLY DISINTEGRATING ORAL AT BEDTIME
Refills: 0 | Status: DISCONTINUED | OUTPATIENT
Start: 2021-01-01 | End: 2021-01-01

## 2021-01-01 RX ORDER — CEFTRIAXONE 500 MG/1
1000 INJECTION, POWDER, FOR SOLUTION INTRAMUSCULAR; INTRAVENOUS EVERY 24 HOURS
Refills: 0 | Status: COMPLETED | OUTPATIENT
Start: 2021-01-01 | End: 2021-01-01

## 2021-01-01 RX ORDER — LEVOTHYROXINE SODIUM 125 MCG
50 TABLET ORAL AT BEDTIME
Refills: 0 | Status: DISCONTINUED | OUTPATIENT
Start: 2021-01-01 | End: 2021-01-01

## 2021-01-01 RX ORDER — ENOXAPARIN SODIUM 100 MG/ML
30 INJECTION SUBCUTANEOUS EVERY 24 HOURS
Refills: 0 | Status: DISCONTINUED | OUTPATIENT
Start: 2021-01-01 | End: 2021-01-01

## 2021-01-01 RX ORDER — MORPHINE SULFATE 50 MG/1
2 CAPSULE, EXTENDED RELEASE ORAL EVERY 4 HOURS
Refills: 0 | Status: DISCONTINUED | OUTPATIENT
Start: 2021-01-01 | End: 2021-01-01

## 2021-01-01 RX ORDER — PIPERACILLIN AND TAZOBACTAM 4; .5 G/20ML; G/20ML
4.5 INJECTION, POWDER, LYOPHILIZED, FOR SOLUTION INTRAVENOUS ONCE
Refills: 0 | Status: COMPLETED | OUTPATIENT
Start: 2021-01-01 | End: 2021-01-01

## 2021-01-01 RX ORDER — APIXABAN 2.5 MG/1
5 TABLET, FILM COATED ORAL EVERY 12 HOURS
Refills: 0 | Status: DISCONTINUED | OUTPATIENT
Start: 2021-01-01 | End: 2021-01-01

## 2021-01-01 RX ORDER — ACETAMINOPHEN 500 MG
650 TABLET ORAL ONCE
Refills: 0 | Status: COMPLETED | OUTPATIENT
Start: 2021-01-01 | End: 2021-01-01

## 2021-01-01 RX ORDER — FUROSEMIDE 40 MG
20 TABLET ORAL ONCE
Refills: 0 | Status: DISCONTINUED | OUTPATIENT
Start: 2021-01-01 | End: 2021-01-01

## 2021-01-01 RX ORDER — FUROSEMIDE 40 MG
40 TABLET ORAL ONCE
Refills: 0 | Status: COMPLETED | OUTPATIENT
Start: 2021-01-01 | End: 2021-01-01

## 2021-01-01 RX ORDER — CEFTRIAXONE 500 MG/1
1000 INJECTION, POWDER, FOR SOLUTION INTRAMUSCULAR; INTRAVENOUS ONCE
Refills: 0 | Status: COMPLETED | OUTPATIENT
Start: 2021-01-01 | End: 2021-01-01

## 2021-01-01 RX ORDER — PIPERACILLIN AND TAZOBACTAM 4; .5 G/20ML; G/20ML
4.5 INJECTION, POWDER, LYOPHILIZED, FOR SOLUTION INTRAVENOUS EVERY 12 HOURS
Refills: 0 | Status: DISCONTINUED | OUTPATIENT
Start: 2021-01-01 | End: 2021-01-01

## 2021-01-01 RX ORDER — ENOXAPARIN SODIUM 100 MG/ML
40 INJECTION SUBCUTANEOUS EVERY 12 HOURS
Refills: 0 | Status: DISCONTINUED | OUTPATIENT
Start: 2021-01-01 | End: 2021-01-01

## 2021-01-01 RX ORDER — AZITHROMYCIN 500 MG/1
500 TABLET, FILM COATED ORAL ONCE
Refills: 0 | Status: COMPLETED | OUTPATIENT
Start: 2021-01-01 | End: 2021-01-01

## 2021-01-01 RX ORDER — POTASSIUM CHLORIDE 20 MEQ
40 PACKET (EA) ORAL ONCE
Refills: 0 | Status: COMPLETED | OUTPATIENT
Start: 2021-01-01 | End: 2021-01-01

## 2021-01-01 RX ORDER — ATORVASTATIN CALCIUM 80 MG/1
20 TABLET, FILM COATED ORAL AT BEDTIME
Refills: 0 | Status: DISCONTINUED | OUTPATIENT
Start: 2021-01-01 | End: 2021-01-01

## 2021-01-01 RX ORDER — POTASSIUM PHOSPHATE, MONOBASIC POTASSIUM PHOSPHATE, DIBASIC 236; 224 MG/ML; MG/ML
30 INJECTION, SOLUTION INTRAVENOUS ONCE
Refills: 0 | Status: COMPLETED | OUTPATIENT
Start: 2021-01-01 | End: 2021-01-01

## 2021-01-01 RX ORDER — SODIUM CHLORIDE 9 MG/ML
250 INJECTION INTRAMUSCULAR; INTRAVENOUS; SUBCUTANEOUS ONCE
Refills: 0 | Status: COMPLETED | OUTPATIENT
Start: 2021-01-01 | End: 2021-01-01

## 2021-01-01 RX ORDER — ENOXAPARIN SODIUM 100 MG/ML
40 INJECTION SUBCUTANEOUS DAILY
Refills: 0 | Status: DISCONTINUED | OUTPATIENT
Start: 2021-01-01 | End: 2021-01-01

## 2021-01-01 RX ORDER — AMIODARONE HYDROCHLORIDE 400 MG/1
200 TABLET ORAL EVERY 24 HOURS
Refills: 0 | Status: DISCONTINUED | OUTPATIENT
Start: 2021-01-01 | End: 2021-01-01

## 2021-01-01 RX ORDER — ACETAMINOPHEN 500 MG
650 TABLET ORAL EVERY 6 HOURS
Refills: 0 | Status: DISCONTINUED | OUTPATIENT
Start: 2021-01-01 | End: 2021-01-01

## 2021-01-01 RX ORDER — ROSUVASTATIN CALCIUM 5 MG/1
1 TABLET ORAL
Qty: 0 | Refills: 0 | DISCHARGE

## 2021-01-01 RX ORDER — SODIUM,POTASSIUM PHOSPHATES 278-250MG
1 POWDER IN PACKET (EA) ORAL ONCE
Refills: 0 | Status: COMPLETED | OUTPATIENT
Start: 2021-01-01 | End: 2021-01-01

## 2021-01-01 RX ORDER — AMIODARONE HYDROCHLORIDE 400 MG/1
200 TABLET ORAL DAILY
Refills: 0 | Status: DISCONTINUED | OUTPATIENT
Start: 2021-01-01 | End: 2021-01-01

## 2021-01-01 RX ORDER — ACETAMINOPHEN 500 MG
1000 TABLET ORAL ONCE
Refills: 0 | Status: COMPLETED | OUTPATIENT
Start: 2021-01-01 | End: 2021-01-01

## 2021-01-01 RX ORDER — SODIUM CHLORIDE 9 MG/ML
1000 INJECTION INTRAMUSCULAR; INTRAVENOUS; SUBCUTANEOUS ONCE
Refills: 0 | Status: COMPLETED | OUTPATIENT
Start: 2021-01-01 | End: 2021-01-01

## 2021-01-01 RX ORDER — APIXABAN 2.5 MG/1
2.5 TABLET, FILM COATED ORAL EVERY 12 HOURS
Refills: 0 | Status: DISCONTINUED | OUTPATIENT
Start: 2021-01-01 | End: 2021-01-01

## 2021-01-01 RX ORDER — MORPHINE SULFATE 50 MG/1
2 CAPSULE, EXTENDED RELEASE ORAL ONCE
Refills: 0 | Status: DISCONTINUED | OUTPATIENT
Start: 2021-01-01 | End: 2021-01-01

## 2021-01-01 RX ORDER — MIRTAZAPINE 45 MG/1
1 TABLET, ORALLY DISINTEGRATING ORAL
Qty: 0 | Refills: 0 | DISCHARGE

## 2021-01-01 RX ORDER — POTASSIUM CHLORIDE 20 MEQ
10 PACKET (EA) ORAL
Refills: 0 | Status: COMPLETED | OUTPATIENT
Start: 2021-01-01 | End: 2021-01-01

## 2021-01-01 RX ORDER — METOPROLOL TARTRATE 50 MG
5 TABLET ORAL EVERY 6 HOURS
Refills: 0 | Status: DISCONTINUED | OUTPATIENT
Start: 2021-01-01 | End: 2021-01-01

## 2021-01-01 RX ORDER — ENOXAPARIN SODIUM 100 MG/ML
50 INJECTION SUBCUTANEOUS EVERY 24 HOURS
Refills: 0 | Status: DISCONTINUED | OUTPATIENT
Start: 2021-01-01 | End: 2021-01-01

## 2021-01-01 RX ORDER — SODIUM CHLORIDE 9 MG/ML
1000 INJECTION, SOLUTION INTRAVENOUS ONCE
Refills: 0 | Status: COMPLETED | OUTPATIENT
Start: 2021-01-01 | End: 2021-01-01

## 2021-01-01 RX ORDER — APIXABAN 5 MG/1
5 TABLET, FILM COATED ORAL
Qty: 180 | Refills: 1 | Status: ACTIVE | COMMUNITY
Start: 2020-01-01 | End: 1900-01-01

## 2021-01-01 RX ORDER — METOPROLOL TARTRATE 50 MG
5 TABLET ORAL ONCE
Refills: 0 | Status: COMPLETED | OUTPATIENT
Start: 2021-01-01 | End: 2021-01-01

## 2021-01-01 RX ORDER — PANTOPRAZOLE SODIUM 20 MG/1
40 TABLET, DELAYED RELEASE ORAL DAILY
Refills: 0 | Status: DISCONTINUED | OUTPATIENT
Start: 2021-01-01 | End: 2021-01-01

## 2021-01-01 RX ORDER — AZITHROMYCIN 500 MG/1
500 TABLET, FILM COATED ORAL EVERY 24 HOURS
Refills: 0 | Status: DISCONTINUED | OUTPATIENT
Start: 2021-01-01 | End: 2021-01-01

## 2021-01-01 RX ORDER — MEMANTINE HYDROCHLORIDE 10 MG/1
10 TABLET ORAL
Refills: 0 | Status: DISCONTINUED | OUTPATIENT
Start: 2021-01-01 | End: 2021-01-01

## 2021-01-01 RX ORDER — ACETAMINOPHEN 500 MG
750 TABLET ORAL ONCE
Refills: 0 | Status: COMPLETED | OUTPATIENT
Start: 2021-01-01 | End: 2021-01-01

## 2021-01-01 RX ORDER — PIPERACILLIN AND TAZOBACTAM 4; .5 G/20ML; G/20ML
4.5 INJECTION, POWDER, LYOPHILIZED, FOR SOLUTION INTRAVENOUS EVERY 8 HOURS
Refills: 0 | Status: DISCONTINUED | OUTPATIENT
Start: 2021-01-01 | End: 2021-01-01

## 2021-01-01 RX ADMIN — PANTOPRAZOLE SODIUM 40 MILLIGRAM(S): 20 TABLET, DELAYED RELEASE ORAL at 12:13

## 2021-01-01 RX ADMIN — CEFTRIAXONE 100 MILLIGRAM(S): 500 INJECTION, POWDER, FOR SOLUTION INTRAMUSCULAR; INTRAVENOUS at 15:20

## 2021-01-01 RX ADMIN — Medication 40 MILLIGRAM(S): at 22:27

## 2021-01-01 RX ADMIN — PIPERACILLIN AND TAZOBACTAM 25 GRAM(S): 4; .5 INJECTION, POWDER, LYOPHILIZED, FOR SOLUTION INTRAVENOUS at 19:49

## 2021-01-01 RX ADMIN — ATORVASTATIN CALCIUM 20 MILLIGRAM(S): 80 TABLET, FILM COATED ORAL at 23:05

## 2021-01-01 RX ADMIN — CEFTRIAXONE 100 MILLIGRAM(S): 500 INJECTION, POWDER, FOR SOLUTION INTRAMUSCULAR; INTRAVENOUS at 16:51

## 2021-01-01 RX ADMIN — Medication 5 MILLIGRAM(S): at 06:56

## 2021-01-01 RX ADMIN — Medication 750 MILLIGRAM(S): at 09:25

## 2021-01-01 RX ADMIN — AMIODARONE HYDROCHLORIDE 200 MILLIGRAM(S): 400 TABLET ORAL at 17:58

## 2021-01-01 RX ADMIN — CEFTRIAXONE 100 MILLIGRAM(S): 500 INJECTION, POWDER, FOR SOLUTION INTRAMUSCULAR; INTRAVENOUS at 15:14

## 2021-01-01 RX ADMIN — APIXABAN 2.5 MILLIGRAM(S): 2.5 TABLET, FILM COATED ORAL at 23:05

## 2021-01-01 RX ADMIN — MORPHINE SULFATE 2 MILLIGRAM(S): 50 CAPSULE, EXTENDED RELEASE ORAL at 07:29

## 2021-01-01 RX ADMIN — Medication 100 MILLIEQUIVALENT(S): at 13:17

## 2021-01-01 RX ADMIN — Medication 40 MILLIGRAM(S): at 17:16

## 2021-01-01 RX ADMIN — ENOXAPARIN SODIUM 50 MILLIGRAM(S): 100 INJECTION SUBCUTANEOUS at 23:10

## 2021-01-01 RX ADMIN — Medication 50 MICROGRAM(S): at 23:47

## 2021-01-01 RX ADMIN — PIPERACILLIN AND TAZOBACTAM 25 GRAM(S): 4; .5 INJECTION, POWDER, LYOPHILIZED, FOR SOLUTION INTRAVENOUS at 20:55

## 2021-01-01 RX ADMIN — SODIUM CHLORIDE 500 MILLILITER(S): 9 INJECTION INTRAMUSCULAR; INTRAVENOUS; SUBCUTANEOUS at 22:25

## 2021-01-01 RX ADMIN — ENOXAPARIN SODIUM 50 MILLIGRAM(S): 100 INJECTION SUBCUTANEOUS at 22:57

## 2021-01-01 RX ADMIN — Medication 50 MICROGRAM(S): at 22:25

## 2021-01-01 RX ADMIN — APIXABAN 2.5 MILLIGRAM(S): 2.5 TABLET, FILM COATED ORAL at 11:46

## 2021-01-01 RX ADMIN — POTASSIUM PHOSPHATE, MONOBASIC POTASSIUM PHOSPHATE, DIBASIC 83.33 MILLIMOLE(S): 236; 224 INJECTION, SOLUTION INTRAVENOUS at 14:32

## 2021-01-01 RX ADMIN — Medication 1 PACKET(S): at 17:37

## 2021-01-01 RX ADMIN — MEMANTINE HYDROCHLORIDE 10 MILLIGRAM(S): 10 TABLET ORAL at 19:05

## 2021-01-01 RX ADMIN — PANTOPRAZOLE SODIUM 40 MILLIGRAM(S): 20 TABLET, DELAYED RELEASE ORAL at 11:59

## 2021-01-01 RX ADMIN — MORPHINE SULFATE 2 MILLIGRAM(S): 50 CAPSULE, EXTENDED RELEASE ORAL at 07:14

## 2021-01-01 RX ADMIN — PANTOPRAZOLE SODIUM 40 MILLIGRAM(S): 20 TABLET, DELAYED RELEASE ORAL at 23:47

## 2021-01-01 RX ADMIN — Medication 5 MILLIGRAM(S): at 21:17

## 2021-01-01 RX ADMIN — Medication 40 MILLIGRAM(S): at 12:46

## 2021-01-01 RX ADMIN — AMIODARONE HYDROCHLORIDE 200 MILLIGRAM(S): 400 TABLET ORAL at 17:40

## 2021-01-01 RX ADMIN — MIRTAZAPINE 7.5 MILLIGRAM(S): 45 TABLET, ORALLY DISINTEGRATING ORAL at 23:05

## 2021-01-01 RX ADMIN — Medication 5 MILLIGRAM(S): at 13:50

## 2021-01-01 RX ADMIN — PIPERACILLIN AND TAZOBACTAM 25 GRAM(S): 4; .5 INJECTION, POWDER, LYOPHILIZED, FOR SOLUTION INTRAVENOUS at 08:13

## 2021-01-01 RX ADMIN — Medication 400 MILLIGRAM(S): at 14:24

## 2021-01-01 RX ADMIN — PANTOPRAZOLE SODIUM 40 MILLIGRAM(S): 20 TABLET, DELAYED RELEASE ORAL at 12:00

## 2021-01-01 RX ADMIN — Medication 5 MILLIGRAM(S): at 06:59

## 2021-01-01 RX ADMIN — Medication 50 MICROGRAM(S): at 21:45

## 2021-01-01 RX ADMIN — Medication 40 MILLIGRAM(S): at 16:52

## 2021-01-01 RX ADMIN — Medication 5 MILLIGRAM(S): at 00:34

## 2021-01-01 RX ADMIN — Medication 400 MILLIGRAM(S): at 09:18

## 2021-01-01 RX ADMIN — PANTOPRAZOLE SODIUM 40 MILLIGRAM(S): 20 TABLET, DELAYED RELEASE ORAL at 11:46

## 2021-01-01 RX ADMIN — Medication 50 MICROGRAM(S): at 23:30

## 2021-01-01 RX ADMIN — PANTOPRAZOLE SODIUM 40 MILLIGRAM(S): 20 TABLET, DELAYED RELEASE ORAL at 13:48

## 2021-01-01 RX ADMIN — Medication 50 MICROGRAM(S): at 22:57

## 2021-01-01 RX ADMIN — ENOXAPARIN SODIUM 30 MILLIGRAM(S): 100 INJECTION SUBCUTANEOUS at 23:48

## 2021-01-01 RX ADMIN — Medication 5 MILLIGRAM(S): at 15:05

## 2021-01-01 RX ADMIN — Medication 50 MICROGRAM(S): at 23:04

## 2021-01-01 RX ADMIN — MEMANTINE HYDROCHLORIDE 10 MILLIGRAM(S): 10 TABLET ORAL at 07:04

## 2021-01-01 RX ADMIN — AZITHROMYCIN 255 MILLIGRAM(S): 500 TABLET, FILM COATED ORAL at 16:06

## 2021-01-01 RX ADMIN — SODIUM CHLORIDE 1000 MILLILITER(S): 9 INJECTION INTRAMUSCULAR; INTRAVENOUS; SUBCUTANEOUS at 15:20

## 2021-01-01 RX ADMIN — Medication 50 MICROGRAM(S): at 23:10

## 2021-01-01 RX ADMIN — Medication 100 MILLIEQUIVALENT(S): at 12:01

## 2021-01-01 RX ADMIN — CEFTRIAXONE 100 MILLIGRAM(S): 500 INJECTION, POWDER, FOR SOLUTION INTRAMUSCULAR; INTRAVENOUS at 15:21

## 2021-01-01 RX ADMIN — PIPERACILLIN AND TAZOBACTAM 200 GRAM(S): 4; .5 INJECTION, POWDER, LYOPHILIZED, FOR SOLUTION INTRAVENOUS at 12:46

## 2021-01-01 RX ADMIN — SODIUM CHLORIDE 1000 MILLILITER(S): 9 INJECTION, SOLUTION INTRAVENOUS at 10:26

## 2021-01-01 RX ADMIN — Medication 650 MILLIGRAM(S): at 15:20

## 2021-01-01 RX ADMIN — Medication 40 MILLIGRAM(S): at 07:14

## 2021-01-01 RX ADMIN — CEFTRIAXONE 100 MILLIGRAM(S): 500 INJECTION, POWDER, FOR SOLUTION INTRAMUSCULAR; INTRAVENOUS at 14:12

## 2021-01-01 RX ADMIN — Medication 100 MILLIEQUIVALENT(S): at 15:17

## 2021-01-01 RX ADMIN — PANTOPRAZOLE SODIUM 40 MILLIGRAM(S): 20 TABLET, DELAYED RELEASE ORAL at 11:28

## 2021-01-01 RX ADMIN — Medication 1000 MILLIGRAM(S): at 18:58

## 2021-01-01 RX ADMIN — MEMANTINE HYDROCHLORIDE 10 MILLIGRAM(S): 10 TABLET ORAL at 17:59

## 2021-01-10 NOTE — DIETITIAN INITIAL EVALUATION ADULT. - PROBLEM/PLAN-4
Well appearing, awake, alert, oriented to person, place, time/situation and in no apparent distress. normal... DISPLAY PLAN FREE TEXT

## 2021-07-14 NOTE — ED PROVIDER NOTE - NS ED ROS FT
Constitutional: No fever or chills.   Eyes: No pain, blurry vision, or discharge.  ENMT: No hearing changes, pain, discharge or infections. No neck pain or stiffness.  Cardiac: No chest pain, SOB or edema.   Respiratory: No cough or respiratory distress.   GI: No nausea, vomiting, diarrhea or abdominal pain.  : No dysuria, frequency or burning.  MS: No myalgia, muscle weakness, joint pain or back pain.  Neuro: No headache or weakness. No LOC.  Skin: No skin rash. no

## 2021-10-03 PROBLEM — I48.91 UNSPECIFIED ATRIAL FIBRILLATION: Chronic | Status: ACTIVE | Noted: 2020-06-01

## 2021-10-03 NOTE — PROGRESS NOTE ADULT - SUBJECTIVE AND OBJECTIVE BOX
Patient remains unresponsive Temp down BP better VR 60s Chest is clear ant Abd is soft Slight edema

## 2021-10-03 NOTE — PROGRESS NOTE ADULT - ASSESSMENT
Probable sepsis  Unclear source  To cont IV antibiotics and O2  Discussed in detail with the patients family who are aware of grave prognosis

## 2021-10-03 NOTE — H&P ADULT - NSHPPHYSICALEXAM_GEN_ALL_CORE
VITAL SIGNS:  T(C): 36.6 (10-03-21 @ 17:45), Max: 39.1 (10-03-21 @ 15:36)  T(F): 97.9 (10-03-21 @ 17:45), Max: 102.4 (10-03-21 @ 15:36)  HR: 60 (10-03-21 @ 17:45) (60 - 72)  BP: 147/69 (10-03-21 @ 18:21) (103/51 - 165/70)  BP(mean): --  RR: 19 (10-03-21 @ 18:21) (18 - 20)  SpO2: 99% (10-03-21 @ 18:21) (94% - 100%)  Wt(kg): --    PHYSICAL EXAM:    Constitutional: elderly, thin, female  Head: NC/AT  Eyes: PERRL, EOMI, anicteric sclera  ENT: no nasal discharge; uvula midline, no oropharyngeal erythema or exudates; MMM  Neck: supple; no JVD or thyromegaly  Respiratory: CTA B/L; no W/R/R, no retractions  Cardiac: +S1/S2; RRR; no M/R/G  Gastrointestinal: abdomen soft, NT/ND; no rebound or guarding; +BSx4  Back: spine midline, no bony tenderness or step-offs  Extremities: WWP, no clubbing or cyanosis; no peripheral edema  Musculoskeletal: NROM x4; no joint swelling, tenderness or erythema  Vascular: 2+ radial, DP/PT pulses B/L  Dermatologic: skin warm, dry and intact; no rashes, wounds, or scars  Lymphatic: no submandibular or cervical LAD  Neurologic: responsive to painful stimuli. Ox3; CNII-XII grossly intact; no focal deficits  Psychiatric: affect and characteristics of appearance, verbalizations, behaviors are appropriate VITAL SIGNS:  T(C): 36.6 (10-03-21 @ 17:45), Max: 39.1 (10-03-21 @ 15:36)  T(F): 97.9 (10-03-21 @ 17:45), Max: 102.4 (10-03-21 @ 15:36)  HR: 60 (10-03-21 @ 17:45) (60 - 72)  BP: 147/69 (10-03-21 @ 18:21) (103/51 - 165/70)  BP(mean): --  RR: 19 (10-03-21 @ 18:21) (18 - 20)  SpO2: 99% (10-03-21 @ 18:21) (94% - 100%)  Wt(kg): --    PHYSICAL EXAM:    Constitutional: elderly, thin, female  Head: NC/AT  Eyes: PERRL  ENT: no nasal discharge; dry mucous membranes  Neck: supple; no JVD or thyromegaly  Respiratory: CTA B/L anteriorly; no W/R/R, no retractions  Cardiac: +S1/S2; irregularly irregular; no M/R/G  Gastrointestinal: abdomen soft, NT/ND; no rebound or guarding; +BSx4  Extremities: WWP, no clubbing or cyanosis; trace peripheral edema, b/l ecchymosis  Musculoskeletal:  no joint swelling, tenderness or erythema  Vascular: 2+ radial, DP/PT pulses B/L  Dermatologic: skin warm, dry and intact  Lymphatic: no submandibular or cervical LAD  Neurologic: responsive to painful stimuli, pt unable to participate

## 2021-10-03 NOTE — ED ADULT NURSE NOTE - OBJECTIVE STATEMENT
89y bedbound female BIBA accompanied by daughter for fever and chills. pt fully vaccinated for covid. As per daughter, pt was 99.5F orally at home. daughter states, "I brought her in because she has been shaking a lot since yesterday. usually she speaks a little and she hasn't been talking." hx of HTN, HLD, dementia. as per EMS, pt was 80% on RA. however in ED 89% on RA. 100% on 3L NC. received pt as upgrade in bed 7 to dr. khan.  rectal temp of 102.4F. sacral redness noted. rectal Tylenol administered. EKG completed. pt placed on ccm. pt placed on primafit. daughter denies any other noticeable difference in pt's behavior. warm blankets provided.

## 2021-10-03 NOTE — PATIENT PROFILE ADULT - FALL HARM RISK
debility/age(85 years old or older)/bones(Osteoporosis,prev fx,steroid use,metastatic bone ca)/other

## 2021-10-03 NOTE — ED ADULT TRIAGE NOTE - CHIEF COMPLAINT QUOTE
Patient c/o generalized weakness, palpitations, feeling anxious today because her son is getting a medical procedure done this week.  Denies active chest pain, worsening shortness of breath, cough, fever, n/v/d.  EKG in progress. Patient's daughter called ems because patient is more altered than usual, hot to touch, and worsening shortness of breath x few days.  spo2 80% on room air / sp02 94% on non-rebreather.  Hx: cva, dementia (per daughter patient is non-verbal at baseline)

## 2021-10-03 NOTE — ED PROVIDER NOTE - PHYSICAL EXAMINATION
CONSTITUTIONAL: elderly chronically ill appearing  HEAD: Normocephalic; atraumatic.   EYES:  conjunctiva and sclera clear  ENT: normal nose; no rhinorrhea; normal pharynx with no erythema or lesions. dry MM  NECK: Supple; non-tender;   CARDIOVASCULAR: Normal S1, S2; no murmurs, rubs, or gallops. Regular rate and rhythm.   RESPIRATORY: increased rate and effort, +crackles  GI: Soft; non-distended; non-tender; no palpable organomegaly.   EXT: No cyanosis or edema; N/V intact  SKIN: Normal for age and race; warm; dry; good turgor; no apparent lesions or rash.   NEURO: somnolent but maintains airway, once in a while will respond but not consistently or appropriately, withdraws to pain or cold

## 2021-10-03 NOTE — H&P ADULT - PROBLEM SELECTOR PLAN 2
- hypoxic, requiring 3L NC  - possibly 2/2 to pna vs fluid overload however pt appears dry on exam.  - wean O2 as toleratedd  - continue with abx as above - hypoxic, requiring 3L NC  - possibly 2/2 to pna vs fluid overload however pt appears dry on exam. Last TTE 6/2020 w/ LVEF 72%  - wean O2 as tolerated  - continue with abx as above

## 2021-10-03 NOTE — ED PROVIDER NOTE - OBJECTIVE STATEMENT
89 y/o F with h/o Afib (on Eliquis/ Toprol), s/p DCCVin 2019,  dementia, hypothyroidism, GERD  accompanied by daughter for weakness, confusion x several days, tactile fevers and rigors. temps at home were afebrile but done orally. overall pt has been declining over the past 2 yrs, nearly non verbal now. sees dr simpson q 6 wks and they have been able to keep her out of the hospital.   daughter at bedside clarifies that they recognize that she is 89 years old, would like to treat any reversible illness, but big picture, want her to die at home, no extraordinary measures.  pcp dr simpson  cards dona cevallos

## 2021-10-03 NOTE — H&P ADULT - NSHPLABSRESULTS_GEN_ALL_CORE
.  LABS:                         13.6   16.14 )-----------( 178      ( 03 Oct 2021 15:15 )             41.8     10-03    137  |  103  |  19  ----------------------------<  90  4.0   |  23  |  0.98    Ca    10.3      03 Oct 2021 15:15    TPro  7.0  /  Alb  3.1<L>  /  TBili  0.8  /  DBili  x   /  AST  54<H>  /  ALT  21  /  AlkPhos  109  10-03    PT/INR - ( 03 Oct 2021 15:15 )   PT: 22.4 sec;   INR: 1.93          PTT - ( 03 Oct 2021 15:15 )  PTT:32.9 sec    CARDIAC MARKERS ( 03 Oct 2021 15:15 )  x     / 0.02 ng/mL / x     / x     / x          Serum Pro-Brain Natriuretic Peptide: 66116 pg/mL (10-03 @ 15:15)    Lactate, Blood: 1.6 mmol/L (10-03 @ 15:11)      RADIOLOGY, EKG & ADDITIONAL TESTS: Reviewed.

## 2021-10-03 NOTE — H&P ADULT - PROBLEM SELECTOR PLAN 3
- on home eliquis 5 mg BID and amiodarone 200 mg QD  - hold for now and restart when pt's mental status improves

## 2021-10-03 NOTE — H&P ADULT - HISTORY OF PRESENT ILLNESS
In ED, vitals T 102.4 rectal, HR 72, /82, RR 19, O2 94% on NRB --> 100% on 3L NC  Labs significant for wbc 16.14, INR 1.93, trop 0.02, BNP 13,000  CXR: b/l opacities, pulmonary congestion  EKG: AF  ED management: Ceftriaxone x1, azithro x1, 1 L NS, tylenol Pt is a 89 y/o F with h/o Afib (on Eliquis/ Toprol), s/p DCCVin 2019,  dementia, hypothyroidism, GERD presenting with altered mental status, generalized weakness x3 days. Daughters at bedside reports pt with decreased PO intake. Reports tactile fevers and chills however oral temperature was normal. Per family, pt at baseline is A&Ox1-2, ?nonverbal. Has had progressive decline the past 2 years.  Pt sees Dr. Marmolejo and was told to come to ED for further workup and management. Unable to obtain ROS from pt or HPI.      In ED, vitals T 102.4 rectal, HR 72, /82, RR 19, O2 94% on NRB --> 100% on 3L NC  Labs significant for wbc 16.14, INR 1.93, trop 0.02, BNP 13,000  CXR: ?b/l opacities, pulmonary congestion  EKG: AF  ED management: Ceftriaxone x1, azithro x1, 1 L NS, tylenol

## 2021-10-03 NOTE — GOALS OF CARE CONVERSATION - ADVANCED CARE PLANNING - CONVERSATION DETAILS
Discussion with daughters stating that they would not want any "heroic measures" to be performed Discussion with daughters stating that they would not want any "heroic measures" to be performed, DNR/DNI. No feeding tube. Would like trial of abx and IV fluids. Fermin arredondo

## 2021-10-03 NOTE — ED PROVIDER NOTE - CLINICAL SUMMARY MEDICAL DECISION MAKING FREE TEXT BOX
here w/ hypoxia, dehdyration, febrile on rectal temp to 102. covered for CAP. blood cultures sent. doing well on 3L NC, o2 100%. think safe for regional, can get palliative involved as well tomorrow for dc planning

## 2021-10-03 NOTE — ED ADULT NURSE NOTE - CHIEF COMPLAINT QUOTE
Patient's daughter called ems because patient is more altered than usual, hot to touch, and worsening shortness of breath x few days.  spo2 80% on room air / sp02 94% on non-rebreather.  Hx: cva, dementia (per daughter patient is non-verbal at baseline)

## 2021-10-04 NOTE — DIETITIAN INITIAL EVALUATION ADULT. - OTHER INFO
89y/o female with history of AFib,S/p DCCV in 2019,Dementia,Hypothyroidism,GERD admitted with change in mental status weakness and poor po.GOC noted.No aggressive intervention .No TF .No N/V/D .?BM ,Skin with stage 1 of left and right hip.Per aide, patient usually weights about 150lbs,Noted [facility] admission weights:June 2020:155lbs, January 2019:159lbs,Recorded present weight:108lbs,Bedscale weights 114lbs,Patient visually appears heavier than 114lbs.Pending bedside swallow evaluation when patient is more alert.

## 2021-10-04 NOTE — DIETITIAN INITIAL EVALUATION ADULT. - PROBLEM SELECTOR PLAN 2
- hypoxic, requiring 3L NC  - possibly 2/2 to pna vs fluid overload however pt appears dry on exam. Last TTE 6/2020 w/ LVEF 72%  - wean O2 as tolerated  - continue with abx as above

## 2021-10-04 NOTE — CONSULT NOTE ADULT - PROBLEM SELECTOR RECOMMENDATION 2
- Likely multifactorial in the setting of PNA, delirium, confounding dementia.  - Supportive care.  - Will follow during the hospitalization to evaluate if improves.

## 2021-10-04 NOTE — CONSULT NOTE ADULT - PROBLEM SELECTOR RECOMMENDATION 4
- Patient with known advanced dementia.  - As per HHA at bedside, has been progressively declining.  - At this point, does not appear to be a hospice candidate, but will follow during the hospitalization.  - DNR/DNI. MOLST in chart.

## 2021-10-04 NOTE — PROGRESS NOTE ADULT - PROBLEM SELECTOR PLAN 2
Hypoxic, requiring 3L NC, possibly 2/2 to pna vs fluid overload however pt appears dry on exam. Last TTE 6/2020 w/ LVEF 72%  - wean O2 as tolerated  - continue with abx as above

## 2021-10-04 NOTE — PROGRESS NOTE ADULT - SUBJECTIVE AND OBJECTIVE BOX
OVERNIGHT EVENTS:    SUBJECTIVE / INTERVAL HPI: Patient seen and examined at bedside.     VITAL SIGNS:  Vital Signs Last 24 Hrs  T(C): 36.6 (04 Oct 2021 05:51), Max: 39.1 (03 Oct 2021 15:36)  T(F): 97.9 (04 Oct 2021 05:51), Max: 102.4 (03 Oct 2021 15:36)  HR: 69 (04 Oct 2021 05:51) (60 - 73)  BP: 118/84 (04 Oct 2021 05:51) (103/51 - 170/81)  BP(mean): --  RR: 20 (04 Oct 2021 05:51) (18 - 20)  SpO2: 98% (04 Oct 2021 05:51) (94% - 100%)    PHYSICAL EXAM:    General: WDWN  HEENT: NC/AT; PERRL, anicteric sclera; MMM  Neck: supple  Cardiovascular: +S1/S2; RRR  Respiratory: CTA B/L; no W/R/R  Gastrointestinal: soft, NT/ND; +BSx4  Extremities: WWP; no edema, clubbing or cyanosis  Vascular: 2+ radial, DP/PT pulses B/L  Neurological: AAOx3; no focal deficits    MEDICATIONS:  MEDICATIONS  (STANDING):  cefTRIAXone   IVPB 1000 milliGRAM(s) IV Intermittent every 24 hours  enoxaparin Injectable 30 milliGRAM(s) SubCutaneous every 24 hours  levothyroxine Injectable 50 MICROGram(s) IV Push at bedtime  pantoprazole  Injectable 40 milliGRAM(s) IV Push daily    MEDICATIONS  (PRN):  acetaminophen   Tablet .. 650 milliGRAM(s) Oral every 6 hours PRN Temp greater or equal to 38C (100.4F), Mild Pain (1 - 3)      ALLERGIES:  Allergies    No Known Allergies    Intolerances        LABS:                        13.0   14.31 )-----------( 166      ( 04 Oct 2021 08:42 )             40.0     10-04    139  |  104  |  21  ----------------------------<  88  3.7   |  23  |  0.80    Ca    9.8      04 Oct 2021 08:42  Phos  2.3     10-04  Mg     2.0     10-04    TPro  7.0  /  Alb  3.1<L>  /  TBili  0.8  /  DBili  x   /  AST  54<H>  /  ALT  21  /  AlkPhos  109  10-03    PT/INR - ( 03 Oct 2021 15:15 )   PT: 22.4 sec;   INR: 1.93          PTT - ( 03 Oct 2021 15:15 )  PTT:32.9 sec  Urinalysis Basic - ( 04 Oct 2021 00:45 )    Color: Yellow / Appearance: Clear / SG: >=1.030 / pH: x  Gluc: x / Ketone: Trace mg/dL  / Bili: Small / Urobili: 1.0 E.U./dL   Blood: x / Protein: 100 mg/dL / Nitrite: NEGATIVE   Leuk Esterase: NEGATIVE / RBC: < 5 /HPF / WBC > 10 /HPF   Sq Epi: x / Non Sq Epi: 0-5 /HPF / Bacteria: Present /HPF      CAPILLARY BLOOD GLUCOSE          RADIOLOGY & ADDITIONAL TESTS: Reviewed.   OVERNIGHT EVENTS: no acute events reported    SUBJECTIVE / INTERVAL HPI: Patient seen and examined at bedside.     VITAL SIGNS:  Vital Signs Last 24 Hrs  T(C): 36.6 (04 Oct 2021 05:51), Max: 39.1 (03 Oct 2021 15:36)  T(F): 97.9 (04 Oct 2021 05:51), Max: 102.4 (03 Oct 2021 15:36)  HR: 69 (04 Oct 2021 05:51) (60 - 73)  BP: 118/84 (04 Oct 2021 05:51) (103/51 - 170/81)  BP(mean): --  RR: 20 (04 Oct 2021 05:51) (18 - 20)  SpO2: 98% (04 Oct 2021 05:51) (94% - 100%)    PHYSICAL EXAM:    General: WDWN, in NAD, minimally participatory on examination   HEENT: NC/AT; anicteric sclera; MM dry  Neck: supple without palpable nodularity   Cardiovascular: +S1/S2; RRR  Respiratory: bibasilar crackles appreciable; no W/R/R  Gastrointestinal: soft, NT/ND; +BSx4  Extremities: WWP; B/L UE 1+ & LE 2+ pitting edema without clubbing or cyanosis  Vascular: 2+ radial, DP pulses B/L  Neurological: could not assess AAO as patient would not participate with questioning; no focal deficits    Primafit appropriately placed    MEDICATIONS:  MEDICATIONS  (STANDING):  cefTRIAXone   IVPB 1000 milliGRAM(s) IV Intermittent every 24 hours  enoxaparin Injectable 30 milliGRAM(s) SubCutaneous every 24 hours  levothyroxine Injectable 50 MICROGram(s) IV Push at bedtime  pantoprazole  Injectable 40 milliGRAM(s) IV Push daily    MEDICATIONS  (PRN):  acetaminophen   Tablet .. 650 milliGRAM(s) Oral every 6 hours PRN Temp greater or equal to 38C (100.4F), Mild Pain (1 - 3)      ALLERGIES:  Allergies    No Known Allergies    Intolerances        LABS:                        13.0   14.31 )-----------( 166      ( 04 Oct 2021 08:42 )             40.0     10-04    139  |  104  |  21  ----------------------------<  88  3.7   |  23  |  0.80    Ca    9.8      04 Oct 2021 08:42  Phos  2.3     10-04  Mg     2.0     10-04    TPro  7.0  /  Alb  3.1<L>  /  TBili  0.8  /  DBili  x   /  AST  54<H>  /  ALT  21  /  AlkPhos  109  10-03    PT/INR - ( 03 Oct 2021 15:15 )   PT: 22.4 sec;   INR: 1.93          PTT - ( 03 Oct 2021 15:15 )  PTT:32.9 sec  Urinalysis Basic - ( 04 Oct 2021 00:45 )    Color: Yellow / Appearance: Clear / SG: >=1.030 / pH: x  Gluc: x / Ketone: Trace mg/dL  / Bili: Small / Urobili: 1.0 E.U./dL   Blood: x / Protein: 100 mg/dL / Nitrite: NEGATIVE   Leuk Esterase: NEGATIVE / RBC: < 5 /HPF / WBC > 10 /HPF   Sq Epi: x / Non Sq Epi: 0-5 /HPF / Bacteria: Present /HPF      CAPILLARY BLOOD GLUCOSE          RADIOLOGY & ADDITIONAL TESTS: Reviewed.

## 2021-10-04 NOTE — PROGRESS NOTE ADULT - PROBLEM SELECTOR PLAN 1
Pt meeting sepsis criteria with fever 102.4 and wbc of 16.14 and possible pulmonary source. Lactate wnl at 1.6  - s/p ceftriaxone and azithro in ED, 1 L NS, and tylenol  - c/w CFX 1g q24h x 5d (10/3-)  - azithro D/C'd i/s/o negative U legionella  - U/A NGTD 1 day continue to follow  - bcx NGTD 1 day continue to follow

## 2021-10-04 NOTE — CONSULT NOTE ADULT - SUBJECTIVE AND OBJECTIVE BOX
HPI:  Pt is a 89 y/o F with h/o Afib (on Eliquis/ Toprol), s/p DCCVin 2019,  dementia, hypothyroidism, GERD presenting with altered mental status, generalized weakness x3 days. Daughters at bedside reports pt with decreased PO intake. Reports tactile fevers and chills however oral temperature was normal. Per family, pt at baseline is A&Ox1-2, ?nonverbal. Has had progressive decline the past 2 years.  Pt sees Dr. Marmolejo and was told to come to ED for further workup and management. Unable to obtain ROS from pt or HPI.      In ED, vitals T 102.4 rectal, HR 72, /82, RR 19, O2 94% on NRB --> 100% on 3L NC  Labs significant for wbc 16.14, INR 1.93, trop 0.02, BNP 13,000  CXR: ?b/l opacities, pulmonary congestion  EKG: AF  ED management: Ceftriaxone x1, azithro x1, 1 L NS, tylenol (03 Oct 2021 17:28)    PERTINENT PM/SXH:   Hypothyroid    Dementia    GERD (gastroesophageal reflux disease)    Atrial fibrillation      History of hip replacement, total, left      FAMILY HISTORY:  No pertinent family history in first degree relatives      ITEMS NOT CHECKED ARE NOT PRESENT    SOCIAL HISTORY:   Significant other/partner:  [ ]  Children:  [ ]  Hoahaoism/Spirituality:  Substance hx:  [ ]   Tobacco hx:  [ ]   Alcohol hx: [ ]   Home Opioid hx:  [ ] I-Stop Reference No:  Living Situation: [ ]Home  [ ]Long term care  [ ]Rehab [ ]Other    ADVANCE DIRECTIVES:    DNR  MOLST  [ ]  Living Will  [ ]   DECISION MAKER(s):  [ ] Health Care Proxy(s)  [ ] Surrogate(s)  [ ] Guardian           Name(s): Phone Number(s):    BASELINE (I)ADL(s) (prior to admission):  Star: [ ]Total  [ ] Moderate [ ]Dependent    Allergies    No Known Allergies    Intolerances    MEDICATIONS  (STANDING):  cefTRIAXone   IVPB 1000 milliGRAM(s) IV Intermittent every 24 hours  enoxaparin Injectable 50 milliGRAM(s) SubCutaneous every 24 hours  levothyroxine Injectable 50 MICROGram(s) IV Push at bedtime  pantoprazole  Injectable 40 milliGRAM(s) IV Push daily    MEDICATIONS  (PRN):  acetaminophen   Tablet .. 650 milliGRAM(s) Oral every 6 hours PRN Temp greater or equal to 38C (100.4F), Mild Pain (1 - 3)    PRESENT SYMPTOMS: [ ]Unable to obtain due to poor mentation   Source if other than patient:  [ ]Family   [ ]Team     Pain (Impact on QOL):    Location -         Minimal acceptable level (0-10 scale):                    Aggravating factors -  Quality -  Radiation -  Severity (0-10 scale) -    Timing -    PAIN AD Score:     [email or web address] (press ctrl +  left click to view)    Dyspnea:                           [ ]Mild [ ]Moderate [ ]Severe  Anxiety:                             [ ]Mild [ ]Moderate [ ]Severe  Fatigue:                             [ ]Mild [ ]Moderate [ ]Severe  Nausea:                             [ ]Mild [ ]Moderate [ ]Severe  Loss of appetite:              [ ]Mild [ ]Moderate [ ]Severe  Constipation:                    [ ]Mild [ ]Moderate [ ]Severe  Grief Present                    [ ] Yes   [ ] No   Other Symptoms:  [ ]All other review of systems negative     Karnofsky Performance Score/Palliative Performance Status Version 2:         %    http://palliative.info/resource_material/PPSv2.pdf  PHYSICAL EXAM:  Vital Signs Last 24 Hrs  T(C): 37.1 (04 Oct 2021 20:42), Max: 37.1 (04 Oct 2021 20:42)  T(F): 98.8 (04 Oct 2021 20:42), Max: 98.8 (04 Oct 2021 20:42)  HR: 60 (04 Oct 2021 20:42) (60 - 70)  BP: 119/64 (04 Oct 2021 20:42) (118/84 - 144/85)  BP(mean): --  RR: 17 (04 Oct 2021 20:42) (17 - 20)  SpO2: 96% (04 Oct 2021 20:42) (96% - 98%) I&O's Summary    03 Oct 2021 07:01  -  04 Oct 2021 07:00  --------------------------------------------------------  IN: 0 mL / OUT: 50 mL / NET: -50 mL    GENERAL:  [ ]Alert  [ ]Oriented x   [ ]Lethargic  [ ]Cachexia  [ ]Unarousable  [ ]Verbal  [ ]Non-Verbal  Behavioral:   [ ] Anxiety  [ ] Delirium [ ] Agitation [ ] Other  HEENT:  [ ]Normal   [ ]Dry mouth   [ ]ET Tube/Trach  [ ]Oral lesions  PULMONARY:   [ ]Clear [ ]Tachypnea  [ ]Audible excessive secretions   [ ]Rhonchi        [ ]Right [ ]Left [ ]Bilateral  [ ]Crackles        [ ]Right [ ]Left [ ]Bilateral  [ ]Wheezing     [ ]Right [ ]Left [ ]Bilateral  CARDIOVASCULAR:    [ ]Regular [ ]Irregular [ ]Tachy  [ ]Joshua [ ]Murmur [ ]Other  GASTROINTESTINAL:  [ ]Soft  [ ]Distended   [ ]+BS  [ ]Non tender [ ]Tender  [ ]PEG [ ]OGT/ NGT  Last BM: GENITOURINARY:  [ ]Normal [ ] Incontinent   [ ]Oliguria/Anuria   [ ]Becker  MUSCULOSKELETAL:   [ ]Normal   [ ]Weakness  [ ]Bed/Wheelchair bound [ ]Edema  NEUROLOGIC:   [ ]No focal deficits  [ ] Cognitive impairment  [ ] Dysphagia [ ]Dysarthria [ ] Paresis [ ]Other   SKIN:   [ ]Normal   [ ]Pressure ulcer(s)  [ ]Rash    CRITICAL CARE:  [ ] Shock Present  [ ]Septic [ ]Cardiogenic [ ]Neurologic [ ]Hypovolemic  [ ]  Vasopressors [ ]  Inotropes   [ ] Respiratory failure present  [ ] Acute  [ ] Chronic [ ] Hypoxic  [ ] Hypercarbic [ ] Other  [ ] Other organ failure     LABS:                        13.0   14.31 )-----------( 166      ( 04 Oct 2021 08:42 )             40.0   10-04    139  |  104  |  21  ----------------------------<  88  3.7   |  23  |  0.80    Ca    9.8      04 Oct 2021 08:42  Phos  2.3     10-04  Mg     2.0     10-04    TPro  7.0  /  Alb  3.1<L>  /  TBili  0.8  /  DBili  x   /  AST  54<H>  /  ALT  21  /  AlkPhos  109  10-03  PT/INR - ( 03 Oct 2021 15:15 )   PT: 22.4 sec;   INR: 1.93          PTT - ( 03 Oct 2021 15:15 )  PTT:32.9 sec    Urinalysis Basic - ( 04 Oct 2021 00:45 )    Color: Yellow / Appearance: Clear / SG: >=1.030 / pH: x  Gluc: x / Ketone: Trace mg/dL  / Bili: Small / Urobili: 1.0 E.U./dL   Blood: x / Protein: 100 mg/dL / Nitrite: NEGATIVE   Leuk Esterase: NEGATIVE / RBC: < 5 /HPF / WBC > 10 /HPF   Sq Epi: x / Non Sq Epi: 0-5 /HPF / Bacteria: Present /HPF      RADIOLOGY & ADDITIONAL STUDIES:    PROTEIN CALORIE MALNUTRITION PRESENT: [ ] Yes [ ] No  [ ] PPSV2 < or = to 30% [ ] significant weight loss  [ ] poor nutritional intake [ ] catabolic state [ ] anasarca     Artificial Nutrition [ ]     REFERRALS:   [ ]Chaplaincy  [ ] Hospice  [ ]Child Life  [ ]Social Work  [ ]Case management [ ]Holistic Therapy   Goals of Care Discussion Document:  HPI:  Pt is a 89 y/o F with h/o Afib (on Eliquis/ Toprol), s/p DCCVin 2019,  dementia, hypothyroidism, GERD presenting with altered mental status, generalized weakness x3 days. Daughters at bedside reports pt with decreased PO intake. Reports tactile fevers and chills however oral temperature was normal. Per family, pt at baseline is A&Ox1-2, ?nonverbal. Has had progressive decline the past 2 years.  Pt sees Dr. Marmolejo and was told to come to ED for further workup and management. Unable to obtain ROS from pt or HPI.      In ED, vitals T 102.4 rectal, HR 72, /82, RR 19, O2 94% on NRB --> 100% on 3L NC  Labs significant for wbc 16.14, INR 1.93, trop 0.02, BNP 13,000  CXR: ?b/l opacities, pulmonary congestion  EKG: AF  ED management: Ceftriaxone x1, azithro x1, 1 L NS, tylenol (03 Oct 2021 17:28)    PERTINENT PM/SXH:   Hypothyroid    Dementia    GERD (gastroesophageal reflux disease)    Atrial fibrillation      History of hip replacement, total, left      FAMILY HISTORY:  No pertinent family history in first degree relatives      ITEMS NOT CHECKED ARE NOT PRESENT    SOCIAL HISTORY:   Significant other/partner:  [ ]  Children:  [x ]  Jewish/Spirituality:  Rastafari  Substance hx:  [ ]   Tobacco hx:  [ ]   Alcohol hx: [ ]   Home Opioid hx:  [ ] I-Stop Reference No:  Living Situation: [ ]Home  [ ]Long term care  [ ]Rehab [ ]Other    ADVANCE DIRECTIVES:    DNR  MOLST  [ ]  Living Will  [ ]   DECISION MAKER(s):  [x ] Health Care Proxy(s)  [ ] Surrogate(s)  [ ] Guardian           Name(s): Phone Number(s): Fredi Chinchilla (daughter) [phone]    BASELINE (I)ADL(s) (prior to admission):  [place]: [ ]Total  [ ] Moderate [x ]Dependent    Allergies    No Known Allergies    Intolerances    MEDICATIONS  (STANDING):  cefTRIAXone   IVPB 1000 milliGRAM(s) IV Intermittent every 24 hours  enoxaparin Injectable 50 milliGRAM(s) SubCutaneous every 24 hours  levothyroxine Injectable 50 MICROGram(s) IV Push at bedtime  pantoprazole  Injectable 40 milliGRAM(s) IV Push daily    MEDICATIONS  (PRN):  acetaminophen   Tablet .. 650 milliGRAM(s) Oral every 6 hours PRN Temp greater or equal to 38C (100.4F), Mild Pain (1 - 3)    PRESENT SYMPTOMS: [x ]Unable to obtain due to poor mentation   Source if other than patient:  [ ]Family   [ ]Team     Pain (Impact on QOL):  Unable  Location -         Minimal acceptable level (0-10 scale):                    Aggravating factors -  Quality -  Radiation -  Severity (0-10 scale) -    Timing -    PAIN AD Score:     [email or web address]/[email or web address] (press ctrl +  left click to view)    Dyspnea:                           [ ]Mild [ ]Moderate [ ]Severe  Anxiety:                             [ ]Mild [ ]Moderate [ ]Severe  Fatigue:                             [ ]Mild [ ]Moderate [ ]Severe  Nausea:                             [ ]Mild [ ]Moderate [ ]Severe  Loss of appetite:              [ ]Mild [ ]Moderate [ ]Severe  Constipation:                    [ ]Mild [ ]Moderate [ ]Severe  Grief Present                    [ ] Yes   [ ] No   Other Symptoms:  [ x]All other review of systems negative     Karnofsky Performance Score/Palliative Performance Status Version 2: 30         %    http://palliative.info/resource_material/PPSv2.pdf  PHYSICAL EXAM:  Vital Signs Last 24 Hrs  T(C): 37.1 (04 Oct 2021 20:42), Max: 37.1 (04 Oct 2021 20:42)  T(F): 98.8 (04 Oct 2021 20:42), Max: 98.8 (04 Oct 2021 20:42)  HR: 60 (04 Oct 2021 20:42) (60 - 70)  BP: 119/64 (04 Oct 2021 20:42) (118/84 - 144/85)  BP(mean): --  RR: 17 (04 Oct 2021 20:42) (17 - 20)  SpO2: 96% (04 Oct 2021 20:42) (96% - 98%) I&O's Summary    03 Oct 2021 07:01  -  04 Oct 2021 07:00  --------------------------------------------------------  IN: 0 mL / OUT: 50 mL / NET: -50 mL    GENERAL:  [ ]Alert  [ ]Oriented x   [x ]Lethargic  [ ]Cachexia  [ ]Unarousable  [ ]Verbal  [ ]Non-Verbal  Behavioral:   [ ] Anxiety  [ ] Delirium [ ] Agitation [x ] Other  HEENT:  [ ]Normal   [ x]Dry mouth   [ ]ET Tube/Trach  [ ]Oral lesions  PULMONARY:   [ ]Clear [ ]Tachypnea  [ ]Audible excessive secretions   [x ]Rhonchi        [ ]Right [ ]Left [ ]Bilateral  [ ]Crackles        [ ]Right [ ]Left [ ]Bilateral  [ ]Wheezing     [ ]Right [ ]Left [ ]Bilateral  CARDIOVASCULAR:    [x ]Regular [ ]Irregular [ ]Tachy  [ ]Joshua [ ]Murmur [ ]Other  GASTROINTESTINAL:  [ x]Soft  [ ]Distended   [x ]+BS  [x ]Non tender [ ]Tender  [ ]PEG [ ]OGT/ NGT  Last BM: GENITOURINARY:  [ ]Normal [ ] Incontinent   [ ]Oliguria/Anuria   [ ]Becker  MUSCULOSKELETAL:   [ ]Normal   [ ]Weakness  [x ]Bed/Wheelchair bound [ ]Edema  NEUROLOGIC:   [ ]No focal deficits  [x ] Cognitive impairment  [ ] Dysphagia [ ]Dysarthria [ ] Paresis [ ]Other   SKIN:   [x ]Normal   [ ]Pressure ulcer(s)  [ ]Rash    CRITICAL CARE:  [ ] Shock Present  [ ]Septic [ ]Cardiogenic [ ]Neurologic [ ]Hypovolemic  [ ]  Vasopressors [ ]  Inotropes   [ ] Respiratory failure present  [ ] Acute  [ ] Chronic [ ] Hypoxic  [ ] Hypercarbic [ ] Other  [ ] Other organ failure     LABS:                        13.0   14.31 )-----------( 166      ( 04 Oct 2021 08:42 )             40.0   10-04    139  |  104  |  21  ----------------------------<  88  3.7   |  23  |  0.80    Ca    9.8      04 Oct 2021 08:42  Phos  2.3     10-04  Mg     2.0     10-04    TPro  7.0  /  Alb  3.1<L>  /  TBili  0.8  /  DBili  x   /  AST  54<H>  /  ALT  21  /  AlkPhos  109  10-03  PT/INR - ( 03 Oct 2021 15:15 )   PT: 22.4 sec;   INR: 1.93          PTT - ( 03 Oct 2021 15:15 )  PTT:32.9 sec    Urinalysis Basic - ( 04 Oct 2021 00:45 )    Color: Yellow / Appearance: Clear / SG: >=1.030 / pH: x  Gluc: x / Ketone: Trace mg/dL  / Bili: Small / Urobili: 1.0 E.U./dL   Blood: x / Protein: 100 mg/dL / Nitrite: NEGATIVE   Leuk Esterase: NEGATIVE / RBC: < 5 /HPF / WBC > 10 /HPF   Sq Epi: x / Non Sq Epi: 0-5 /HPF / Bacteria: Present /HPF      RADIOLOGY & ADDITIONAL STUDIES:    PROTEIN CALORIE MALNUTRITION PRESENT: [ ] Yes [ ] No  [ ] PPSV2 < or = to 30% [ ] significant weight loss  [ ] poor nutritional intake [ ] catabolic state [ ] anasarca     Artificial Nutrition [ ]     REFERRALS:   [ ]Chaplaincy  [ ] Hospice  [ ]Child Life  [ ]Social Work  [ ]Case management [ ]Holistic Therapy   Goals of Care Discussion Document:

## 2021-10-04 NOTE — CHART NOTE - NSCHARTNOTEFT_GEN_A_CORE
PALLIATIVE MEDICINE COORDINATION OF CARE NOTE FOR [facility]  [  ] ED Trigger   [  ] MICU Trigger     [ x ] Consult    Patient last assessed: ___10/4/21__  to manage: GOC/AD, Symptoms, and Support was recommended:__10/4/21____    ____40__ Minutes; Start: __0920___  End: 1000___, of non-face-to-face prolonged service provided that relates to (face-to-face) care that has or will occur and ongoing patient management, including one or more of the following:   - Reviewed records from other physicians or other health care professional services, including one or more of the following: other medical records and diagnostic / radiology study results     HPI:  Pt is a 89 y/o F with h/o Afib (on Eliquis/ Toprol), s/p DCCVin 2019,  dementia, hypothyroidism, GERD presenting with altered mental status, generalized weakness x3 days. Daughters at bedside reports pt with decreased PO intake. Reports tactile fevers and chills however oral temperature was normal. Per family, pt at baseline is A&Ox1-2, ?nonverbal. Has had progressive decline the past 2 years.  Pt sees Dr. Marmolejo and was told to come to ED for further workup and management. Unable to obtain ROS from pt or HPI.      In ED, vitals T 102.4 rectal, HR 72, /82, RR 19, O2 94% on NRB --> 100% on 3L NC  Labs significant for wbc 16.14, INR 1.93, trop 0.02, BNP 13,000  CXR: ?b/l opacities, pulmonary congestion  EKG: AF  ED management: Ceftriaxone x1, azithro x1, 1 L NS, tylenol (03 Oct 2021 17:28)      - Other: iStop reviewed.    - Other: Medication reviewed.    The patient HAS NOT used PRN's in the last 24h.    MEDICATIONS  (STANDING):    MEDICATIONS  (PRN):      - Other: Advanced directives     DNR DNI     No documented MOLST form found on [facility]     No documented HCP form found on [facility]     No Living will / POA / Advance directives found on [place] / [facility].     No documented GOC notes on [facility]    - Other: Coordination/Plan of care     1____ admissions in 1 year     Current admission LOS: _1__ days     LACE score: __13__ ADVANCE ILLNESS PATIENT.     Patient NOT previously seen by palliative medicine consult service.      Discussed with  Consult request for: "  Dementia  "    Patient is an Advanced Illness patient hence the primary team will need to complete GOC document of any prior GOC discussions that were done during this admission so far as well as information available for Proxy/surrogates/NOK/guardians prior to a palliative contact.    Full consult to follow within 24h.    Will reassess later today during bedside rounds.

## 2021-10-04 NOTE — CONSULT NOTE ADULT - ASSESSMENT
Patient seen and examined.  Full consult to follow.    Palliative care consulted for evaluation of hospice.   Upon discussion with HHA, patient has been ambulatory, although limited at home, and is still able to communicate her needs.   FAST score likely 6+. Although dementia is progressive, it does not seem end stage at this moment, as her encephalopathy is likely confounded by her infection. Will continue to evaluate through the hospitalization.  89 F with encephalopathy, dementia, debility, encounter for palliative care.

## 2021-10-04 NOTE — CONSULT NOTE ADULT - PROBLEM SELECTOR RECOMMENDATION 9
- Patient with known dementia.  - Difficult to ascertain true baseline, but likely FAST 6.   - Still attempts to ambulate at home.  - Decreased PO intake, but still able to eat.  - Still communicates, and is able to make needs known.  - Has difficult time with short term memory.  -

## 2021-10-04 NOTE — PROGRESS NOTE ADULT - PROBLEM SELECTOR PLAN 3
- on home eliquis 5 mg BID and amiodarone 200 mg QD  - hold for now and restart when pt's mental status improves  - started lovenox 30mg q24h

## 2021-10-04 NOTE — PROGRESS NOTE ADULT - ASSESSMENT
Pt is a 89 y/o F with h/o Afib (on Eliquis/ Toprol), s/p DCCV in 2019, dementia, hypothyroidism, GERD presenting with altered mental status. Pt admitted for hypoxic respiratory failure and sepsis 2/2 to possible pna.

## 2021-10-04 NOTE — DIETITIAN INITIAL EVALUATION ADULT. - ORAL INTAKE PTA/DIET HISTORY
Per home healthy aide, patient has been eating less last few weeks with her need to almost force feed her.She stated patient did not require pureed foods.She also stated she does not believe patient has lost 40+lb weight loss.Patient does appear heavier than stated 108lbs

## 2021-10-05 NOTE — PHYSICAL THERAPY INITIAL EVALUATION ADULT - GENERAL OBSERVATIONS, REHAB EVAL
PT IE completed. Chart reviewed. Patient without complaints of pain at rest, agreeable to PT with encouragement. Patient received semi-supine, NAD, +IV hep lock, +2LO2NC, +B/L UE edema (L>R, noted with (L)ring finger and wrist jewelry donned), daughter (Akiko) and HHA (Renetta) at bedside, TARA Dominguez cleared patient for treatment.

## 2021-10-05 NOTE — CONSULT NOTE ADULT - SUBJECTIVE AND OBJECTIVE BOX
Patient is a 89y old  Female who presents with a chief complaint of sepsis (05 Oct 2021 08:07)       HPI:  Pt is a 89 y/o F with h/o Afib (on Eliquis/ Toprol), s/p DCCVin 2019,  dementia, hypothyroidism, GERD presenting with altered mental status, generalized weakness x3 days. Daughters at bedside reports pt with decreased PO intake. Reports tactile fevers and chills however oral temperature was normal. Per family, pt at baseline is A&Ox1-2, ?nonverbal. Has had progressive decline the past 2 years.  Pt sees Dr. Marmolejo and was told to come to ED for further workup and management. Unable to obtain ROS from pt or HPI.      In ED, vitals T 102.4 rectal, HR 72, /82, RR 19, O2 94% on NRB --> 100% on 3L NC  Labs significant for wbc 16.14, INR 1.93, trop 0.02, BNP 13,000  CXR: ?b/l opacities, pulmonary congestion  EKG: AF  ED management: Ceftriaxone x1, azithro x1, 1 L NS, tylenol (03 Oct 2021 17:28)      PAST MEDICAL & SURGICAL HISTORY:  Hypothyroid    Dementia    GERD (gastroesophageal reflux disease)    Atrial fibrillation    History of hip replacement, total, left        MEDICATIONS  (STANDING):  cefTRIAXone   IVPB 1000 milliGRAM(s) IV Intermittent every 24 hours  enoxaparin Injectable 50 milliGRAM(s) SubCutaneous every 24 hours  levothyroxine Injectable 50 MICROGram(s) IV Push at bedtime  pantoprazole  Injectable 40 milliGRAM(s) IV Push daily    MEDICATIONS  (PRN):  acetaminophen   Tablet .. 650 milliGRAM(s) Oral every 6 hours PRN Temp greater or equal to 38C (100.4F), Mild Pain (1 - 3)        FAMILY HISTORY:  No pertinent family history in first degree relatives        CBC Full  -  ( 05 Oct 2021 07:46 )  WBC Count : 10.45 K/uL  RBC Count : 3.61 M/uL  Hemoglobin : 11.7 g/dL  Hematocrit : 37.2 %  Platelet Count - Automated : 169 K/uL  Mean Cell Volume : 103.0 fl  Mean Cell Hemoglobin : 32.4 pg  Mean Cell Hemoglobin Concentration : 31.5 gm/dL  Auto Neutrophil # : 9.53 K/uL  Auto Lymphocyte # : 0.09 K/uL  Auto Monocyte # : 0.73 K/uL  Auto Eosinophil # : 0.09 K/uL  Auto Basophil # : 0.00 K/uL  Auto Neutrophil % : 91.2 %  Auto Lymphocyte % : 0.9 %  Auto Monocyte % : 7.0 %  Auto Eosinophil % : 0.9 %  Auto Basophil % : 0.0 %      10-05    139  |  105  |  26<H>  ----------------------------<  81  3.9   |  21<L>  |  0.71    Ca    10.1      05 Oct 2021 07:46  Phos  2.5     10-05  Mg     2.0     10-05    TPro  5.7<L>  /  Alb  2.1<L>  /  TBili  0.5  /  DBili  x   /  AST  45<H>  /  ALT  23  /  AlkPhos  95  10-05      Urinalysis Basic - ( 04 Oct 2021 00:45 )    Color: Yellow / Appearance: Clear / SG: >=1.030 / pH: x  Gluc: x / Ketone: Trace mg/dL  / Bili: Small / Urobili: 1.0 E.U./dL   Blood: x / Protein: 100 mg/dL / Nitrite: NEGATIVE   Leuk Esterase: NEGATIVE / RBC: < 5 /HPF / WBC > 10 /HPF   Sq Epi: x / Non Sq Epi: 0-5 /HPF / Bacteria: Present /HPF          Radiology:                Vital Signs Last 24 Hrs  T(C): 37.2 (05 Oct 2021 13:46), Max: 37.2 (05 Oct 2021 13:46)  T(F): 98.9 (05 Oct 2021 13:46), Max: 98.9 (05 Oct 2021 13:46)  HR: 72 (05 Oct 2021 13:46) (60 - 72)  BP: 146/74 (05 Oct 2021 13:46) (119/64 - 160/76)  BP(mean): --  RR: 18 (05 Oct 2021 13:46) (17 - 18)  SpO2: 95% (05 Oct 2021 13:46) (90% - 96%)        REVIEW OF SYSTEMS: as per HPI          Physical Exam:  frail 89 yo  woman lying in bed, somnolent, arousable with sternal rub, minimally verbal    Head: normocephalic, atraumatic    Eyes: PERRLA, EOMI, no nystagmus, sclera anicteric    ENT: nasal discharge, uvula midline, no oropharyngeal erythema/exudate    Neck: supple, negative JVD, negative carotid bruits, no thyromegaly    Chest:  R crackles    Cardiovascular: irregular rate and rhythm, neg murmurs/rubs/gallops    Abdomen: soft, non distended, non tender to palpation in all 4 quadrants, negative rebound/guarding, normal bowel sounds    Extremities:  1 + LE edema, LE ecchymosis    Neurologic Exam:    somnolent mumbling      Motor Exam:    Right UE:              suboptimal effort secondary to somnolence > 3+/5    Left UE:                suboptimal effort secondary to somnolence > 3+/5      Right LE:              suboptimal effort secondary to somnolence > 3+/5    Left LE:                suboptimal effort secondary to somnolence > 3+/5               Sensation:           intact to light touch x 4 extremities                                                 DTR:                   biceps/brachioradialis: equal                                                      patella/ankle: equal                                                        neg clonus                           neg Babinski      Gait:  not tested        PM&R Impression:    1) deconditioned  2) no focal weakness    Recommendations/ Plan :    1) Physical therapy focusing on therapeutic exercises, bed mobility/transfer out of bed evaluation, progressive ambulation with assistive devices prn.    2) Anticipated Disposition Plan/Recs:    pending functional progress

## 2021-10-05 NOTE — PHYSICAL THERAPY INITIAL EVALUATION ADULT - ASR EQUIP NEEDS DISCH PT EVAL
Patient owns rolling walker, transport chair, commode, shower chair. Aide denies patient owning hospital bed although patient and side may both benefit for increased safety with mobility.

## 2021-10-05 NOTE — PHYSICAL THERAPY INITIAL EVALUATION ADULT - LEVEL OF CONSCIOUSNESS, REHAB EVAL
Patient demo spontaneous opening of (R)eye (maintains (L)eye closed), although unable to maintain arousal unless max verbal and tactile cues were provided./lethargic/somnolent

## 2021-10-05 NOTE — PROGRESS NOTE ADULT - SUBJECTIVE AND OBJECTIVE BOX
OVERNIGHT EVENTS: no acute events reported     SUBJECTIVE / INTERVAL HPI: Patient seen and examined at bedside. Patient denies pain, cough, CP, SOB, dyspnea, abdominal pain, n/v/d. Patient has no complaints at this time.    VITAL SIGNS:  Vital Signs Last 24 Hrs  T(C): 37.2 (05 Oct 2021 13:46), Max: 37.2 (05 Oct 2021 13:46)  T(F): 98.9 (05 Oct 2021 13:46), Max: 98.9 (05 Oct 2021 13:46)  HR: 72 (05 Oct 2021 13:46) (60 - 72)  BP: 146/74 (05 Oct 2021 13:46) (119/64 - 160/76)  BP(mean): --  RR: 18 (05 Oct 2021 13:46) (17 - 18)  SpO2: 95% (05 Oct 2021 13:46) (90% - 96%)    PHYSICAL EXAM:    General: WDWN, in NAD, participatory on examination   HEENT: NC/AT; anicteric sclera; MM dry   Neck: supple without palpable nodularity, no JVD  Cardiovascular: +S1/S2; RRR  Respiratory: faintly appreciable bibasilar inspiratory crackles; no W/R/R  Gastrointestinal: soft, NT/ND; +BSx4  Extremities: WWP; no edema, clubbing or cyanosis, tenderness to palpation on anterior tibia B/L   Vascular: 2+ radial, DP pulses B/L  Neurological: AAOx2; no focal deficits    MEDICATIONS:  MEDICATIONS  (STANDING):  cefTRIAXone   IVPB 1000 milliGRAM(s) IV Intermittent every 24 hours  enoxaparin Injectable 50 milliGRAM(s) SubCutaneous every 24 hours  levothyroxine Injectable 50 MICROGram(s) IV Push at bedtime  pantoprazole  Injectable 40 milliGRAM(s) IV Push daily    MEDICATIONS  (PRN):  acetaminophen   Tablet .. 650 milliGRAM(s) Oral every 6 hours PRN Temp greater or equal to 38C (100.4F), Mild Pain (1 - 3)      ALLERGIES:  Allergies    No Known Allergies    Intolerances        LABS:                        11.7   10.45 )-----------( 169      ( 05 Oct 2021 07:46 )             37.2     10-05    139  |  105  |  26<H>  ----------------------------<  81  3.9   |  21<L>  |  0.71    Ca    10.1      05 Oct 2021 07:46  Phos  2.5     10-05  Mg     2.0     10-05    TPro  5.7<L>  /  Alb  2.1<L>  /  TBili  0.5  /  DBili  x   /  AST  45<H>  /  ALT  23  /  AlkPhos  95  10-05      Urinalysis Basic - ( 04 Oct 2021 00:45 )    Color: Yellow / Appearance: Clear / SG: >=1.030 / pH: x  Gluc: x / Ketone: Trace mg/dL  / Bili: Small / Urobili: 1.0 E.U./dL   Blood: x / Protein: 100 mg/dL / Nitrite: NEGATIVE   Leuk Esterase: NEGATIVE / RBC: < 5 /HPF / WBC > 10 /HPF   Sq Epi: x / Non Sq Epi: 0-5 /HPF / Bacteria: Present /HPF      CAPILLARY BLOOD GLUCOSE          RADIOLOGY & ADDITIONAL TESTS: Reviewed.   Hospital Course: Pt is a 89 y/o F with h/o Afib (on Eliquis/ Toprol), s/p DCCV in 2019, dementia, hypothyroidism, GERD presenting with altered mental status. Pt admitted for hypoxic respiratory failure and sepsis 2/2 to possible pna. CXR on admission revealed new left-sided pleural effusion & underlying left lower lobe pneumonia and/or atelectasis cannot be excluded. Patient initiated on Ceftriaxone 1g q24h x 4d to end 10/8. Mental status improving to be D/C home with home PT + 1 person assist.     OVERNIGHT EVENTS: no acute events reported     SUBJECTIVE / INTERVAL HPI: Patient seen and examined at bedside. Patient denies pain, cough, CP, SOB, dyspnea, abdominal pain, n/v/d. Patient has no complaints at this time.    VITAL SIGNS:  Vital Signs Last 24 Hrs  T(C): 37.2 (05 Oct 2021 13:46), Max: 37.2 (05 Oct 2021 13:46)  T(F): 98.9 (05 Oct 2021 13:46), Max: 98.9 (05 Oct 2021 13:46)  HR: 72 (05 Oct 2021 13:46) (60 - 72)  BP: 146/74 (05 Oct 2021 13:46) (119/64 - 160/76)  BP(mean): --  RR: 18 (05 Oct 2021 13:46) (17 - 18)  SpO2: 95% (05 Oct 2021 13:46) (90% - 96%)    PHYSICAL EXAM:    General: WDWN, in NAD, participatory on examination   HEENT: NC/AT; anicteric sclera; MM dry   Neck: supple without palpable nodularity, no JVD  Cardiovascular: +S1/S2; RRR  Respiratory: faintly appreciable bibasilar inspiratory crackles; no W/R/R  Gastrointestinal: soft, NT/ND; +BSx4  Extremities: WWP; no edema, clubbing or cyanosis, tenderness to palpation on anterior tibia B/L   Vascular: 2+ radial, DP pulses B/L  Neurological: AAOx2; no focal deficits    MEDICATIONS:  MEDICATIONS  (STANDING):  cefTRIAXone   IVPB 1000 milliGRAM(s) IV Intermittent every 24 hours  enoxaparin Injectable 50 milliGRAM(s) SubCutaneous every 24 hours  levothyroxine Injectable 50 MICROGram(s) IV Push at bedtime  pantoprazole  Injectable 40 milliGRAM(s) IV Push daily    MEDICATIONS  (PRN):  acetaminophen   Tablet .. 650 milliGRAM(s) Oral every 6 hours PRN Temp greater or equal to 38C (100.4F), Mild Pain (1 - 3)      ALLERGIES:  Allergies    No Known Allergies    Intolerances        LABS:                        11.7   10.45 )-----------( 169      ( 05 Oct 2021 07:46 )             37.2     10-05    139  |  105  |  26<H>  ----------------------------<  81  3.9   |  21<L>  |  0.71    Ca    10.1      05 Oct 2021 07:46  Phos  2.5     10-05  Mg     2.0     10-05    TPro  5.7<L>  /  Alb  2.1<L>  /  TBili  0.5  /  DBili  x   /  AST  45<H>  /  ALT  23  /  AlkPhos  95  10-05      Urinalysis Basic - ( 04 Oct 2021 00:45 )    Color: Yellow / Appearance: Clear / SG: >=1.030 / pH: x  Gluc: x / Ketone: Trace mg/dL  / Bili: Small / Urobili: 1.0 E.U./dL   Blood: x / Protein: 100 mg/dL / Nitrite: NEGATIVE   Leuk Esterase: NEGATIVE / RBC: < 5 /HPF / WBC > 10 /HPF   Sq Epi: x / Non Sq Epi: 0-5 /HPF / Bacteria: Present /HPF      CAPILLARY BLOOD GLUCOSE          RADIOLOGY & ADDITIONAL TESTS: Reviewed.

## 2021-10-05 NOTE — PROGRESS NOTE ADULT - PROBLEM SELECTOR PLAN 2
Hypoxic, requiring 3L NC, possibly 2/2 to pna vs fluid overload however pt appears dry on exam. Last TTE 6/2020 w/ LVEF 72%. Currently utilizing 2LNC @ 95%  - wean O2 as tolerated --> attempted RA where upon patient desaturated to 86%  - determine patient's home O2 requirements  - continue with abx as above

## 2021-10-05 NOTE — PHYSICAL THERAPY INITIAL EVALUATION ADULT - IMPAIRMENTS FOUND, PT EVAL
aerobic capacity/endurance/fine motor/gait, locomotion, and balance/gross motor/integumentary integrity/muscle strength/posture/ROM

## 2021-10-05 NOTE — PROGRESS NOTE ADULT - PROBLEM SELECTOR PLAN 9
F: None  E: replete prn  N: NPO for now  DVT: lovenox 30mg SQ q24h   Code: DNR/DNI.  Dispo: Home with home PT

## 2021-10-05 NOTE — PROGRESS NOTE ADULT - PROBLEM SELECTOR PLAN 1
Pt meeting sepsis criteria on admission with fever 102.4 and wbc of 16.14 and possible pulmonary source. Lactate wnl at 1.6. Has been afebrile since admission.   - s/p ceftriaxone and azithro in ED, 1 L NS, and tylenol  - c/w CFX 1g q24h x 5d (10/3-)  - azithro D/C'd i/s/o negative U legionella  - U/A NGTD 2 day continue to follow  - bcx NGTD 2 day continue to follow

## 2021-10-05 NOTE — PHYSICAL THERAPY INITIAL EVALUATION ADULT - IMPAIRMENTS CONTRIBUTING IMPAIRED BED MOBILITY, REHAB EVAL
patient verbalizes "my back hurts" with assisted mobility/impaired balance/decreased flexibility/pain/impaired postural control/decreased ROM/decreased strength

## 2021-10-05 NOTE — PHYSICAL THERAPY INITIAL EVALUATION ADULT - THERAPY FREQUENCY, PT EVAL
Patient, daughter, and aide educated on frequency of inpatient physical therapy at [facility], daughter and aide verbalized understanding./2-3x/week
(2) assistive person

## 2021-10-05 NOTE — PHYSICAL THERAPY INITIAL EVALUATION ADULT - PERTINENT HX OF CURRENT PROBLEM, REHAB EVAL
Pt is a 89 y/o F with h/o Afib (on Eliquis/ Toprol), s/p DCCVin 2019,  dementia, hypothyroidism, GERD presenting with altered mental status, generalized weakness x3 days. Daughters at bedside reports pt with decreased PO intake. Reports tactile fevers and chills however oral temperature was normal. Per family, pt at baseline is A&Ox1-2. Please refer to H&P on [place] for remaining.

## 2021-10-05 NOTE — PHYSICAL THERAPY INITIAL EVALUATION ADULT - PATIENT/FAMILY AGREES WITH PLAN
Patient's daughter (Akiko) states "they will be taking their mother home, Subacute Rehab is out of the question".

## 2021-10-05 NOTE — PHYSICAL THERAPY INITIAL EVALUATION ADULT - ADDITIONAL COMMENTS
PLOF and social history obtained from daughter (Akiko) at bedside: as of about 3 weeks ago, patient was ambulatory with rolling walker and 1 person assist within apartment and apartment hallway for exercise. Within the last week/recently, patient has primarily been bed bound although aides have been mobilizing her with 1 person squat-pivot to transport chair for mobility throughout the home. Aide at bedside endorses bathing patient in bed as patient is no longer able to safely mobilize in bathroom with 1 person assist. Patient was receiving physical therapy within the home. Daughter and aide both endorse patient has been resistant to help and mildly combative at home.

## 2021-10-05 NOTE — CONSULT NOTE ADULT - ASSESSMENT
per Internal Medicine    89 y/o F with h/o Afib (on Eliquis/ Toprol), s/p DCCV in 2019, dementia, hypothyroidism, GERD presenting with altered mental status. Pt admitted for hypoxic respiratory failure and sepsis 2/2 to possible pna.     Problem/Plan - 1:  ·  Problem: Sepsis.   ·  Plan: Pt meeting sepsis criteria with fever 102.4 and wbc of 16.14 and possible pulmonary source. Lactate wnl at 1.6  - s/p ceftriaxone and azithro in ED, 1 L NS, and tylenol  - c/w CFX 1g q24h x 5d (10/3-)  - azithro D/C'd i/s/o negative U legionella  - U/A NGTD 1 day continue to follow  - bcx NGTD 1 day continue to follow.    Problem/Plan - 2:  ·  Problem: Acute respiratory failure with hypoxia.   ·  Plan: Hypoxic, requiring 3L NC, possibly 2/2 to pna vs fluid overload however pt appears dry on exam. Last TTE 6/2020 w/ LVEF 72%  - wean O2 as tolerated  - continue with abx as above.    Problem/Plan - 3:  ·  Problem: Atrial fibrillation.   ·  Plan: - on home eliquis 5 mg BID and amiodarone 200 mg QD  - hold for now and restart when pt's mental status improves  - started lovenox 30mg q24h.    Problem/Plan - 4:  ·  Problem: Hypothyroidism.   ·  Plan: - on home levothyroxine 75 mcg  - can convert to IV levothyroxine 50 mcg QD  - f/u TSH.    Problem/Plan - 5:  ·  Problem: GERD (gastroesophageal reflux disease).   ·  Plan: - on home dexilant 60 mg QD  - protonix 40 mg interchange IV.    Problem/Plan - 6:  ·  Problem: Dementia.   ·  Plan: - hold home namenda 10 mg BID as pt is NPO  - restart if mental status improves.    Problem/Plan - 7:  ·  Problem: Hyperlipidemia.   ·  Plan: - hold home rosuvastatin 5 mg   - restart if mental status improves.    Problem/Plan - 8:  ·  Problem: Anxiety and depression.   ·  Plan: - hold home mirtazipine 7.5 mg QD  - resume if mental status improves.    Problem/Plan - 9:  ·  Problem: Nutrition, metabolism, and development symptoms.   ·  Plan: F: s/p 1 L NS  E: replete prn  N: NPO for now  DVT: lovenox 30mg SQ q24h   Code: DNR/DNI.

## 2021-10-05 NOTE — PHYSICAL THERAPY INITIAL EVALUATION ADULT - PHYSICAL ASSIST/NONPHYSICAL ASSIST: SUPINE/SIT, REHAB EVAL
able to bring B/L LEs to EOB with ~25% effort; *significantly increased assist for trunk mobility/verbal cues/nonverbal cues (demo/gestures)/1 person assist

## 2021-10-06 PROBLEM — I10 ESSENTIAL HYPERTENSION: Status: ACTIVE | Noted: 2019-12-13

## 2021-10-06 NOTE — PROGRESS NOTE ADULT - SUBJECTIVE AND OBJECTIVE BOX
OVERNIGHT EVENTS:    SUBJECTIVE:  Patient seen and examined at bedside.    Vital Signs Last 12 Hrs  T(F): 98.1 (10-06-21 @ 06:13), Max: 98.6 (10-05-21 @ 20:23)  HR: 67 (10-06-21 @ 06:13) (67 - 68)  BP: 150/69 (10-06-21 @ 06:13) (147/65 - 150/69)  BP(mean): --  RR: 18 (10-06-21 @ 06:13) (18 - 19)  SpO2: 92% (10-06-21 @ 06:13) (91% - 92%)  I&O's Summary      PHYSICAL EXAM:  Constitutional: NAD, comfortable in bed.  HEENT: NC/AT, PERRLA, EOMI, no conjunctival pallor or scleral icterus, MMM  Neck: Supple, no JVD  Respiratory: CTA B/L. No w/r/r.   Cardiovascular: RRR, normal S1 and S2, no m/r/g.   Gastrointestinal: +BS, soft NTND, no guarding or rebound tenderness, no palpable masses   Extremities: wwp; no cyanosis, clubbing or edema.   Vascular: Pulses equal and strong throughout.   Neurological: AAOx3, no CN deficits, strength and sensation intact throughout.   Skin: No gross skin abnormalities or rashes        LABS:                        11.7   10.45 )-----------( 169      ( 05 Oct 2021 07:46 )             37.2     10-05    139  |  105  |  26<H>  ----------------------------<  81  3.9   |  21<L>  |  0.71    Ca    10.1      05 Oct 2021 07:46  Phos  2.5     10-05  Mg     2.0     10-05    TPro  5.7<L>  /  Alb  2.1<L>  /  TBili  0.5  /  DBili  x   /  AST  45<H>  /  ALT  23  /  AlkPhos  95  10-05            RADIOLOGY & ADDITIONAL TESTS:    MEDICATIONS  (STANDING):  atorvastatin 20 milliGRAM(s) Oral at bedtime  cefTRIAXone   IVPB 1000 milliGRAM(s) IV Intermittent every 24 hours  enoxaparin Injectable 50 milliGRAM(s) SubCutaneous every 24 hours  levothyroxine Injectable 50 MICROGram(s) IV Push at bedtime  mirtazapine 7.5 milliGRAM(s) Oral at bedtime  pantoprazole  Injectable 40 milliGRAM(s) IV Push daily    MEDICATIONS  (PRN):  acetaminophen   Tablet .. 650 milliGRAM(s) Oral every 6 hours PRN Temp greater or equal to 38C (100.4F), Mild Pain (1 - 3)   OVERNIGHT EVENTS: RANDA    SUBJECTIVE:  Patient seen and examined at bedside. AAOx1 at baseline, unable to obtain questioning.     Vital Signs Last 12 Hrs  T(F): 98.1 (10-06-21 @ 06:13), Max: 98.6 (10-05-21 @ 20:23)  HR: 67 (10-06-21 @ 06:13) (67 - 68)  BP: 150/69 (10-06-21 @ 06:13) (147/65 - 150/69)  BP(mean): --  RR: 18 (10-06-21 @ 06:13) (18 - 19)  SpO2: 92% (10-06-21 @ 06:13) (91% - 92%)  I&O's Summary      PHYSICAL EXAM:  Constitutional: NAD, comfortable in bed.  HEENT: NC/AT, PERRLA, EOMI, no conjunctival pallor or scleral icterus, MMM  Neck: Supple, no JVD  Respiratory: CTA B/L. No w/r/r.   Cardiovascular: RRR, normal S1 and S2, no m/r/g.   Gastrointestinal: +BS, soft NTND, no guarding or rebound tenderness, no palpable masses   Extremities: wwp; no cyanosis, clubbing or edema.   Vascular: Pulses equal and strong throughout.   Neurological: AAOx3, no CN deficits, strength and sensation intact throughout.   Skin: No gross skin abnormalities or rashes        LABS:                        11.7   10.45 )-----------( 169      ( 05 Oct 2021 07:46 )             37.2     10-05    139  |  105  |  26<H>  ----------------------------<  81  3.9   |  21<L>  |  0.71    Ca    10.1      05 Oct 2021 07:46  Phos  2.5     10-05  Mg     2.0     10-05    TPro  5.7<L>  /  Alb  2.1<L>  /  TBili  0.5  /  DBili  x   /  AST  45<H>  /  ALT  23  /  AlkPhos  95  10-05            RADIOLOGY & ADDITIONAL TESTS:    MEDICATIONS  (STANDING):  atorvastatin 20 milliGRAM(s) Oral at bedtime  cefTRIAXone   IVPB 1000 milliGRAM(s) IV Intermittent every 24 hours  enoxaparin Injectable 50 milliGRAM(s) SubCutaneous every 24 hours  levothyroxine Injectable 50 MICROGram(s) IV Push at bedtime  mirtazapine 7.5 milliGRAM(s) Oral at bedtime  pantoprazole  Injectable 40 milliGRAM(s) IV Push daily    MEDICATIONS  (PRN):  acetaminophen   Tablet .. 650 milliGRAM(s) Oral every 6 hours PRN Temp greater or equal to 38C (100.4F), Mild Pain (1 - 3)   OVERNIGHT EVENTS: RANDA    SUBJECTIVE:  Patient seen and examined at bedside. AAOx1 at baseline, unable to obtain questioning.     Vital Signs Last 12 Hrs  T(F): 98.1 (10-06-21 @ 06:13), Max: 98.6 (10-05-21 @ 20:23)  HR: 67 (10-06-21 @ 06:13) (67 - 68)  BP: 150/69 (10-06-21 @ 06:13) (147/65 - 150/69)  BP(mean): --  RR: 18 (10-06-21 @ 06:13) (18 - 19)  SpO2: 92% (10-06-21 @ 06:13) (91% - 92%)  I&O's Summary    PHYSICAL EXAM:  General: WDWN, in NAD, participatory on examination   HEENT: NC/AT; anicteric sclera; MM dry   Neck: supple without palpable nodularity, no JVD  Cardiovascular: +S1/S2; RRR  Respiratory: faintly appreciable bibasilar inspiratory crackles; no W/R/R  Gastrointestinal: soft, NT/ND; +BSx4  Extremities: WWP; no edema, clubbing or cyanosis, tenderness to palpation on anterior tibia B/L   Vascular: 2+ radial, DP pulses B/L  Neurological: AAOx2; no focal deficits    LABS:                        11.7   10.45 )-----------( 169      ( 05 Oct 2021 07:46 )             37.2     10-05    139  |  105  |  26<H>  ----------------------------<  81  3.9   |  21<L>  |  0.71    Ca    10.1      05 Oct 2021 07:46  Phos  2.5     10-05  Mg     2.0     10-05    TPro  5.7<L>  /  Alb  2.1<L>  /  TBili  0.5  /  DBili  x   /  AST  45<H>  /  ALT  23  /  AlkPhos  95  10-05    RADIOLOGY & ADDITIONAL TESTS:    MEDICATIONS  (STANDING):  atorvastatin 20 milliGRAM(s) Oral at bedtime  cefTRIAXone   IVPB 1000 milliGRAM(s) IV Intermittent every 24 hours  enoxaparin Injectable 50 milliGRAM(s) SubCutaneous every 24 hours  levothyroxine Injectable 50 MICROGram(s) IV Push at bedtime  mirtazapine 7.5 milliGRAM(s) Oral at bedtime  pantoprazole  Injectable 40 milliGRAM(s) IV Push daily    MEDICATIONS  (PRN):  acetaminophen   Tablet .. 650 milliGRAM(s) Oral every 6 hours PRN Temp greater or equal to 38C (100.4F), Mild Pain (1 - 3)

## 2021-10-06 NOTE — PROGRESS NOTE ADULT - PROBLEM SELECTOR PLAN 2
Hypoxic, requiring 3L NC, possibly 2/2 to pna vs fluid overload however pt appears dry on exam. Last TTE 6/2020 w/ LVEF 72%. Currently utilizing 2LNC @ 95%  - wean O2 as tolerated --> attempted RA where upon patient desaturated to 86%  - determine patient's home O2 requirements  - continue with abx as above IMPROVING  possibly 2/2 to pna vs fluid overload however pt appears dry on exam.  Last TTE 6/2020 w/ LVEF 72%.   Plan:  Currently utilizing 2LNC @ 95%  - wean O2 as tolerated --> attempted RA where upon patient desaturated to 86%  - determine patient's home O2 requirements  - continue with abx as above

## 2021-10-06 NOTE — PROGRESS NOTE ADULT - PROBLEM SELECTOR PLAN 4
- on home levothyroxine 75 mcg  - can convert to IV levothyroxine 50 mcg QD  - f/u TSH - on home levothyroxine 75 mcg  Plan:  - C/w IV levothyroxine 50 mcg QD  - f/u TSH

## 2021-10-06 NOTE — PROGRESS NOTE ADULT - PROBLEM SELECTOR PLAN 1
Pt meeting sepsis criteria on admission with fever 102.4 and wbc of 16.14 and possible pulmonary source. Lactate wnl at 1.6. Has been afebrile since admission.   - s/p ceftriaxone and azithro in ED, 1 L NS, and tylenol  - c/w CFX 1g q24h x 5d (10/3-)  - azithro D/C'd i/s/o negative U legionella  - U/A NGTD 2 day continue to follow  - bcx NGTD 2 day continue to follow Pt meeting sepsis criteria on admission with fever 102.4 and wbc of 16.14 and possible pulmonary source. Lactate wnl at 1.6. Has been afebrile since admission.   - s/p ceftriaxone and azithro in ED, 1 L NS, and tylenol  - azithro D/C'd i/s/o negative U legionella  Plan:  - c/w CFX 1g q24h x 5d (10/3-)  - U/A NGTD  - bcx NGTD

## 2021-10-06 NOTE — PROGRESS NOTE ADULT - PROBLEM SELECTOR PLAN 8
Home mirtazipine 7.5 mg QD  - restarted 10/5 c/t monitor mental status Home mirtazipine 7.5 mg QD  C/w mirtazapine 7.5

## 2021-10-06 NOTE — PROGRESS NOTE ADULT - ASSESSMENT
Pt is a 89 y/o F with h/o Afib (on Eliquis/ Toprol), s/p DCCV in 2019, dementia, hypothyroidism, GERD presenting with altered mental status. Pt admitted for hypoxic respiratory failure and sepsis 2/2 to possible pna.

## 2021-10-06 NOTE — PROGRESS NOTE ADULT - PROBLEM SELECTOR PLAN 3
- on home eliquis 5 mg BID and amiodarone 200 mg QD  - hold for now and restart when pt's mental status improves  - started lovenox 30mg q24h - on home eliquis 5 mg BID and amiodarone 200 mg QD  - hold for now and restart when pt's mental status improves  Plan:  - started lovenox 30mg q24h

## 2021-10-07 NOTE — PROGRESS NOTE ADULT - PROBLEM SELECTOR PLAN 9
F: None  E: replete prn  N: NPO for now  DVT: lovenox 30mg SQ q24h   Code: DNR/DNI.  Dispo: Home with home PT F: None  E: replete prn  N: Pureed diet  DVT: lovenox 30mg SQ q24h   Code: DNR/DNI.  Dispo: Home with home PT

## 2021-10-07 NOTE — CONSULT NOTE ADULT - SUBJECTIVE AND OBJECTIVE BOX
*** INCOMPLETE ***    Patient is a 89y old  Female who presents with a chief complaint of sepsis (07 Oct 2021 13:19)    HPI/HOSPITAL COURSE:  HPI: Pt is a 89 y/o F with h/o Afib (on Eliquis/ Toprol), s/p DCCVin 2019,  dementia, hypothyroidism, GERD presenting with altered mental status, generalized weakness x3 days. Daughters at bedside reports pt with decreased PO intake. Reports tactile fevers and chills however oral temperature was normal. Per family, pt at baseline is A&Ox1-2, ?nonverbal. Has had progressive decline the past 2 years.  Pt sees Dr. Marmolejo and was told to come to ED for further workup and management. Unable to obtain ROS from pt or HPI.    HOSPITAL COURSE: In the ED, met SIRS criteria (T 102.4, WBC 16.14) with CXR showing bilateral effusions and pulmonary congestion. Labs also s/f BNP 13,000. Initially on NRB then titrated to 3L nasal cannula. Started on CAP treatment (ceftriaxone + azithromycin), azithromycin discontinued after urine strep and legionella negative.     Overnight pt in afib with RVR (pt with afib on admission, however rate-controlled with HR 60s-70s) requiring IVP lopressor. Oxygen requirements increasing to 6L nasal cannula to maintain O2sat 92-95%. Pulmonary team consulted for worsening hypoxia.      SUBJECTIVE HPI: Patient seen and examined at bedside.      PAST MEDICAL & SURGICAL HISTORY:  Hypothyroid  Dementia  GERD (gastroesophageal reflux disease)  Atrial fibrillation  History of hip replacement, total, left      FAMILY HISTORY: No pertinent family history in first degree relatives      Home Medications:  Dexilant 60 mg oral delayed release capsule: 1 cap(s) orally once a day (03 Oct 2021 18:57)  mirtazapine 7.5 mg oral tablet: 1 tab(s) orally once a day (at bedtime) (03 Oct 2021 18:57)  Namenda 10 mg oral tablet: 1 tab(s) orally 2 times a day (03 Oct 2021 18:57)  rosuvastatin 5 mg oral tablet: 1 tab(s) orally once a day (03 Oct 2021 18:57)      MEDICATIONS  (STANDING):  aMIOdarone    Tablet 200 milliGRAM(s) Oral every 24 hours  apixaban 2.5 milliGRAM(s) Oral every 12 hours  atorvastatin 20 milliGRAM(s) Oral at bedtime  cefTRIAXone   IVPB 1000 milliGRAM(s) IV Intermittent every 24 hours  levothyroxine Injectable 50 MICROGram(s) IV Push at bedtime  memantine 10 milliGRAM(s) Oral two times a day  mirtazapine 7.5 milliGRAM(s) Oral at bedtime  pantoprazole  Injectable 40 milliGRAM(s) IV Push daily  potassium phosphate IVPB 30 milliMole(s) IV Intermittent once    MEDICATIONS  (PRN):  acetaminophen   Tablet .. 650 milliGRAM(s) Oral every 6 hours PRN Temp greater or equal to 38C (100.4F), Mild Pain (1 - 3)      VITAL SIGNS:  Vital Signs Last 24 Hrs  T(C): 36.7 (07 Oct 2021 06:27), Max: 37.1 (06 Oct 2021 21:32)  T(F): 98.1 (07 Oct 2021 06:27), Max: 98.7 (06 Oct 2021 21:32)  HR: 95 (07 Oct 2021 06:27) (95 - 120)  BP: 125/76 (07 Oct 2021 06:27) (109/69 - 125/76)  BP(mean): --  RR: 19 (07 Oct 2021 06:27) (17 - 19)  SpO2: 90% (07 Oct 2021 06:27) (90% - 91%)      PHYSICAL EXAM:  General: Comfortable, pleasant/anxious/agitated, Ill-appearing, well-nourished/frail/cachectic, comfortable / in distress  Neurological: AAOx3, no focal deficits  HEENT: NC/AT; EOMI, PERRL, clear conjunctiva, no nasal or oropharyngeal discharge or exudates, MMM  Neck: supple, no cervical or post-auricular lymphadenopathy  Cardiovascular: +S1/S2, no murmurs/rubs/gallops, RRR  Respiratory: CTA B/L, no diminished breath sounds, no wheezes/rales/rhonchi, no increased work of breathing or accessory muscle use  Gastrointestinal: soft, NT/ND; active BSx4 quadrants  Genitourinary: no suprapubic tenderness, no CVA tenderness  Extremities: WWP; no edema, clubbing or cyanosis  Vascular: 2+ radial, DP/PT pulses B/L  Skin: no rashes  Lines/Drains:       LABS:                        11.1   13.76 )-----------( 189      ( 07 Oct 2021 07:02 )             34.2     10-07    142  |  108  |  29<H>  ----------------------------<  169<H>  3.2<L>   |  26  |  0.73    Ca    10.2      07 Oct 2021 07:02  Phos  1.6     10-07  Mg     1.9     10-07        ABG - ( 07 Oct 2021 11:52 )  pH, Arterial: 7.45  pH, Blood: x     /  pCO2: 41    /  pO2: 62    / HCO3: 28    / Base Excess: 4.1   /  SaO2: 94.4        Microbiology:  Culture - Urine (collected 10-03-21 @ 22:18)  Source: Clean Catch Clean Catch (Midstream)  Final Report (10-05-21 @ 12:27):  No growth    Culture - Blood (collected 10-03-21 @ 15:52)  Source: .Blood Blood-Peripheral  Preliminary Report (10-06-21 @ 16:01):  No growth at 3 days.    Culture - Blood (collected 10-03-21 @ 15:52)  Source: .Blood Blood-Peripheral  Preliminary Report (10-06-21 @ 16:01):  No growth at 3 days.      RADIOLOGY & ADDITIONAL STUDIES: Reviewed.   *** INCOMPLETE ***    Patient is a 89y old  Female who presents with a chief complaint of sepsis (07 Oct 2021 13:19)    HPI/HOSPITAL COURSE:  HPI: Pt is a 89 y/o F with h/o Afib (on Eliquis/ Toprol), s/p DCCVin 2019,  dementia, hypothyroidism, GERD presenting with altered mental status, generalized weakness x3 days. Daughters at bedside reports pt with decreased PO intake. Reports tactile fevers and chills however oral temperature was normal. Per family, pt at baseline is A&Ox1-2, ?nonverbal. Has had progressive decline the past 2 years.  Pt sees Dr. Marmolejo and was told to come to ED for further workup and management. Unable to obtain ROS from pt or HPI.    HOSPITAL COURSE: In the ED, met SIRS criteria (T 102.4, WBC 16.14) with CXR showing bilateral effusions and pulmonary congestion. Labs also s/f BNP 13,000. Initially on NRB then titrated to 3L nasal cannula. Started on CAP treatment (ceftriaxone + azithromycin), azithromycin discontinued after urine strep and legionella negative.     Overnight pt in afib with RVR (pt with afib on admission, however rate-controlled with HR 60s-70s) requiring IVP lopressor. Oxygen requirements increasing to 6L nasal cannula to maintain O2sat 92-95%. Pulmonary team consulted for worsening hypoxia.      SUBJECTIVE HPI: Patient seen and examined at bedside.      PAST MEDICAL & SURGICAL HISTORY:  Hypothyroid  Dementia  GERD (gastroesophageal reflux disease)  Atrial fibrillation  History of hip replacement, total, left      FAMILY HISTORY: No pertinent family history in first degree relatives      Home Medications:  Dexilant 60 mg oral delayed release capsule: 1 cap(s) orally once a day (03 Oct 2021 18:57)  mirtazapine 7.5 mg oral tablet: 1 tab(s) orally once a day (at bedtime) (03 Oct 2021 18:57)  Namenda 10 mg oral tablet: 1 tab(s) orally 2 times a day (03 Oct 2021 18:57)  rosuvastatin 5 mg oral tablet: 1 tab(s) orally once a day (03 Oct 2021 18:57)      MEDICATIONS  (STANDING):  aMIOdarone    Tablet 200 milliGRAM(s) Oral every 24 hours  apixaban 2.5 milliGRAM(s) Oral every 12 hours  atorvastatin 20 milliGRAM(s) Oral at bedtime  cefTRIAXone   IVPB 1000 milliGRAM(s) IV Intermittent every 24 hours  levothyroxine Injectable 50 MICROGram(s) IV Push at bedtime  memantine 10 milliGRAM(s) Oral two times a day  mirtazapine 7.5 milliGRAM(s) Oral at bedtime  pantoprazole  Injectable 40 milliGRAM(s) IV Push daily  potassium phosphate IVPB 30 milliMole(s) IV Intermittent once    MEDICATIONS  (PRN):  acetaminophen   Tablet .. 650 milliGRAM(s) Oral every 6 hours PRN Temp greater or equal to 38C (100.4F), Mild Pain (1 - 3)      VITAL SIGNS:  Vital Signs Last 24 Hrs  T(C): 36.7 (07 Oct 2021 06:27), Max: 37.1 (06 Oct 2021 21:32)  T(F): 98.1 (07 Oct 2021 06:27), Max: 98.7 (06 Oct 2021 21:32)  HR: 95 (07 Oct 2021 06:27) (95 - 120)  BP: 125/76 (07 Oct 2021 06:27) (109/69 - 125/76)  BP(mean): --  RR: 19 (07 Oct 2021 06:27) (17 - 19)  SpO2: 90% (07 Oct 2021 06:27) (90% - 91%)      PHYSICAL EXAM:  General:  ill-appearing elderly female appears older than stated age.  Neurological: somnolent, groans to sternal rub, nonverbal  HEENT: PERRL, clear conjunctiva, no nasal or oropharyngeal discharge or exudates, MMM  Neck: no JVD  Cardiovascular: +S1/S2, irregular rhythm, tachycardic  Respiratory: CTA in anterior lung fields bilaterally, difficult to auscultate posterior lung fields, no increased work of breathing or accessory muscle use  Gastrointestinal: soft, NT/ND; active BSx4 quadrants  Genitourinary: no suprapubic tenderness  Extremities: WWP; no edema bilaterally  Vascular: 2+ radial, DP/PT pulses B/L      LABS:                        11.1   13.76 )-----------( 189      ( 07 Oct 2021 07:02 )             34.2     10-07    142  |  108  |  29<H>  ----------------------------<  169<H>  3.2<L>   |  26  |  0.73    Ca    10.2      07 Oct 2021 07:02  Phos  1.6     10-07  Mg     1.9     10-07      ABG - ( 07 Oct 2021 11:52 ) - pH, Arterial: 7.45  pH, Blood: x     /  pCO2: 41    /  pO2: 62    / HCO3: 28    / Base Excess: 4.1   /  SaO2: 94.4        Microbiology:  Culture - Urine (collected 10-03-21 @ 22:18)  Source: Clean Catch Clean Catch (Midstream)  Final Report (10-05-21 @ 12:27):  No growth    Culture - Blood (collected 10-03-21 @ 15:52)  Source: .Blood Blood-Peripheral  Preliminary Report (10-06-21 @ 16:01):  No growth at 3 days.    Culture - Blood (collected 10-03-21 @ 15:52)  Source: .Blood Blood-Peripheral  Preliminary Report (10-06-21 @ 16:01):  No growth at 3 days.      RADIOLOGY & ADDITIONAL STUDIES: Reviewed. Patient is a 89y old  Female who presents with a chief complaint of sepsis (07 Oct 2021 13:19)    HPI/HOSPITAL COURSE:  HPI: Pt is a 89 y/o F with h/o Afib (on Eliquis/ Toprol), s/p DCCVin 2019,  dementia, hypothyroidism, GERD presenting with altered mental status, generalized weakness x3 days. Daughters at bedside reports pt with decreased PO intake. Reports tactile fevers and chills however oral temperature was normal. Per family, pt at baseline is A&Ox1-2, ?nonverbal. Has had progressive decline the past 2 years.  Pt sees Dr. Marmolejo and was told to come to ED for further workup and management. Unable to obtain ROS from pt or HPI.    HOSPITAL COURSE: In the ED, met SIRS criteria (T 102.4, WBC 16.14) with CXR showing bilateral effusions and pulmonary congestion. Labs also s/f BNP 13,000. Initially on NRB then titrated to 3L nasal cannula. Started on CAP treatment (ceftriaxone + azithromycin), azithromycin discontinued after urine strep and legionella negative.     Overnight pt in afib with RVR (pt with afib on admission, however rate-controlled with HR 60s-70s) requiring IVP lopressor. Oxygen requirements increasing to 6L nasal cannula to maintain O2sat 92-95%. Pulmonary team consulted for worsening hypoxia.      SUBJECTIVE HPI: Patient seen and examined at bedside.      PAST MEDICAL & SURGICAL HISTORY:  Hypothyroid  Dementia  GERD (gastroesophageal reflux disease)  Atrial fibrillation  History of hip replacement, total, left      FAMILY HISTORY: No pertinent family history in first degree relatives      Home Medications:  Dexilant 60 mg oral delayed release capsule: 1 cap(s) orally once a day (03 Oct 2021 18:57)  mirtazapine 7.5 mg oral tablet: 1 tab(s) orally once a day (at bedtime) (03 Oct 2021 18:57)  Namenda 10 mg oral tablet: 1 tab(s) orally 2 times a day (03 Oct 2021 18:57)  rosuvastatin 5 mg oral tablet: 1 tab(s) orally once a day (03 Oct 2021 18:57)      MEDICATIONS  (STANDING):  aMIOdarone    Tablet 200 milliGRAM(s) Oral every 24 hours  apixaban 2.5 milliGRAM(s) Oral every 12 hours  atorvastatin 20 milliGRAM(s) Oral at bedtime  cefTRIAXone   IVPB 1000 milliGRAM(s) IV Intermittent every 24 hours  levothyroxine Injectable 50 MICROGram(s) IV Push at bedtime  memantine 10 milliGRAM(s) Oral two times a day  mirtazapine 7.5 milliGRAM(s) Oral at bedtime  pantoprazole  Injectable 40 milliGRAM(s) IV Push daily  potassium phosphate IVPB 30 milliMole(s) IV Intermittent once    MEDICATIONS  (PRN):  acetaminophen   Tablet .. 650 milliGRAM(s) Oral every 6 hours PRN Temp greater or equal to 38C (100.4F), Mild Pain (1 - 3)      VITAL SIGNS:  Vital Signs Last 24 Hrs  T(C): 36.7 (07 Oct 2021 06:27), Max: 37.1 (06 Oct 2021 21:32)  T(F): 98.1 (07 Oct 2021 06:27), Max: 98.7 (06 Oct 2021 21:32)  HR: 95 (07 Oct 2021 06:27) (95 - 120)  BP: 125/76 (07 Oct 2021 06:27) (109/69 - 125/76)  BP(mean): --  RR: 19 (07 Oct 2021 06:27) (17 - 19)  SpO2: 90% (07 Oct 2021 06:27) (90% - 91%)      PHYSICAL EXAM:  General:  ill-appearing elderly female appears older than stated age.  Neurological: somnolent, groans to sternal rub, nonverbal  HEENT: PERRL, clear conjunctiva, no nasal or oropharyngeal discharge or exudates, MMM  Neck: no JVD  Cardiovascular: +S1/S2, irregular rhythm, tachycardic  Respiratory: CTA in anterior lung fields bilaterally, difficult to auscultate posterior lung fields, no increased work of breathing or accessory muscle use  Gastrointestinal: soft, NT/ND; active BSx4 quadrants  Genitourinary: no suprapubic tenderness  Extremities: WWP; no edema bilaterally  Vascular: 2+ radial, DP/PT pulses B/L      LABS:                        11.1   13.76 )-----------( 189      ( 07 Oct 2021 07:02 )             34.2     10-07    142  |  108  |  29<H>  ----------------------------<  169<H>  3.2<L>   |  26  |  0.73    Ca    10.2      07 Oct 2021 07:02  Phos  1.6     10-07  Mg     1.9     10-07      ABG - ( 07 Oct 2021 11:52 ) - pH, Arterial: 7.45  pH, Blood: x     /  pCO2: 41    /  pO2: 62    / HCO3: 28    / Base Excess: 4.1   /  SaO2: 94.4        Microbiology:  Culture - Urine (collected 10-03-21 @ 22:18)  Source: Clean Catch Clean Catch (Midstream)  Final Report (10-05-21 @ 12:27):  No growth    Culture - Blood (collected 10-03-21 @ 15:52)  Source: .Blood Blood-Peripheral  Preliminary Report (10-06-21 @ 16:01):  No growth at 3 days.    Culture - Blood (collected 10-03-21 @ 15:52)  Source: .Blood Blood-Peripheral  Preliminary Report (10-06-21 @ 16:01):  No growth at 3 days.      RADIOLOGY & ADDITIONAL STUDIES: Reviewed.

## 2021-10-07 NOTE — CONSULT NOTE ADULT - ASSESSMENT
**INCOMPLETE NOTE**    89F with Afib (on Eliquis/ Toprol), s/p DCCV in 2019, dementia, hypothyroidism, GERD presenting with altered mental status, generalized weakness x3 days. Admitted for sepsis 2/2 pneumonia, hospital course c/b worsening hypoxia for which pulmonary team was consulted.    #Hypoxia  Not on home oxygen, initially requiring 2-3L nasal cannula. Overnight increasing oxygen requirements, now on 6L nasal cannula to maintain O2sat 92-95%. Required IV lopressor x1 for afib with RVR (previously had been rate-controlled). CXR with worsening pleural effusions and pulmonary congestion since admission. Etiology of worsening hypoxia most likely 2/2 pulmonary edema and fluid overload. There is possibly a new developing RUL infiltrate, which may suggest concomitant aspiration pneumonia. This is supported by pts dementia, aspiration precautions, and worsening leukocytosis (however pt remains afebrile).  Recommendations:    -Increase diuresis, give additional lasix 40mg IVP. Maintain strict I/O to monitor response to lasix.    -Repeat CXR in AM    -Obtain MRSA swab    -If pt becomes febrile, or evidence of worsening hypoxia or sepsis consider broadening abx to cover for pseudomonas    - **INCOMPLETE NOTE**    89F with Afib (on Eliquis/ Toprol), s/p DCCV in 2019, dementia, hypothyroidism, GERD presenting with altered mental status, generalized weakness x3 days. Admitted for sepsis 2/2 pneumonia, hospital course c/b worsening hypoxia for which pulmonary team was consulted.    #Hypoxia  Not on home oxygen, initially requiring 2-3L nasal cannula. Overnight increasing oxygen requirements, now on 6L nasal cannula to maintain O2sat 92-95%. Required IV lopressor x1 for afib with RVR (previously had been rate-controlled). CXR with worsening pleural effusions and pulmonary congestion since admission. Etiology of worsening hypoxia most likely 2/2 pulmonary edema and fluid overload. There is possibly a new developing RUL infiltrate on CXR, which may suggest concomitant aspiration pneumonia however this is lower on differential. This is supported by pts dementia, aspiration precautions, and worsening leukocytosis (however pt remains afebrile). Pt off of full anticoagulation until last night however unlikely to be pulmonary embolism - physical exam without lower extremity edema or asymmetry and pt had been on DVT ppx dosing of lovenox until eliquis was restarted.   Recommendations:    -Increase diuresis, give additional lasix 40mg IVP. Maintain strict I/O to monitor response to lasix.    -Repeat CXR in AM    -Obtain MRSA swab    -If pt becomes febrile, or evidence of worsening hypoxia or sepsis consider broadening abx to cover for pseudomonas    -If possible, obtain sputum culture 89F with Afib (on Eliquis/ Toprol), s/p DCCV in 2019, dementia, hypothyroidism, GERD presenting with altered mental status, generalized weakness x3 days. Admitted for sepsis 2/2 pneumonia, hospital course c/b worsening hypoxia for which pulmonary team was consulted.    #Hypoxia  Not on home oxygen, initially requiring 2-3L nasal cannula. Overnight increasing oxygen requirements, now on 6L nasal cannula to maintain O2sat 92-95%. Required IV lopressor x1 for afib with RVR (previously had been rate-controlled). CXR with worsening pleural effusions and pulmonary congestion since admission. Etiology of worsening hypoxia most likely 2/2 pulmonary edema and fluid overload. There is possibly a new developing RUL infiltrate on CXR, which may suggest concomitant aspiration pneumonia however this is lower on differential. This is supported by pts dementia, aspiration precautions, and worsening leukocytosis (however pt remains afebrile). Pt off of full anticoagulation until last night however unlikely to be pulmonary embolism - physical exam without lower extremity edema or asymmetry and pt had been on DVT ppx dosing of lovenox until eliquis was restarted.   -bedside ultrasound performed, bilateral b line pattern suggestive of pulmonary edema. large bilateral effusions, L>R, simple-appearing. Also with jagged pleura in RUL which may indicate pneumonia.  Recommendations:    -Increase diuresis, give additional lasix 40mg IVP. Maintain strict I/O to monitor response to lasix.    -Repeat CXR in AM    -Obtain MRSA swab    -If pt becomes febrile, or evidence of worsening hypoxia or sepsis consider broadening abx to cover for pseudomonas    -If possible, obtain sputum culture    -maintain aspiration precautions, elevate angle of bed    #Pleural effusions  Pt found to have worsening pleural effusions and pulmonary edema on CXR since admission. BNP 13,000. Pt without known history of heart failure however appears to be fluid overload. Bedside ultrasound with large bilateral effusions, L>R, simple-appearing. Ddx includes parapneumonic effusion however less likely as effusions are bilateral and L>R, and ?aspiration pneumonia appears to be on the R apical region. More likely 2/2 fluid overload due to fluid resuscitation i/s/o sepsis on admission, compounded by hypoalbuminemia (albumin 2.1 on admission).  Recommendations:    -plan as above for hypoxia    Case discussed with attending and with primary team.  Will continue to follow.

## 2021-10-07 NOTE — PROGRESS NOTE ADULT - PROBLEM SELECTOR PLAN 2
IMPROVING  possibly 2/2 to pna vs fluid overload however pt appears dry on exam.  Last TTE 6/2020 w/ LVEF 72%.   Plan:  Currently utilizing 2LNC @ 95%  - wean O2 as tolerated --> attempted RA where upon patient desaturated to 86%  - determine patient's home O2 requirements  - continue with abx as above Pt met sepsis criteria on admission with fever 102.4 and wbc of 16.14. Lactate wnl at 1.6. Has been afebrile since admission.   - possible pulmonary source as BCx and UCx NGTD  - s/p ceftriaxone and azithro in ED, 1 L NS, and tylenol  - CXR showed bilateral opacities, b/l pleural effusions (unchanged from previous)  - azithro D/C'd i/s/o negative U legionella  - WBC 14->12->13    Plan:  - c/w CFX 1g q24h x 5d (10/3-)  - U/A NGTD  - bcx NGTD

## 2021-10-07 NOTE — PROGRESS NOTE ADULT - SUBJECTIVE AND OBJECTIVE BOX
OVERNIGHT EVENTS:    SUBJECTIVE:  Patient seen and examined at bedside.    Vital Signs Last 12 Hrs  T(F): 98.1 (10-07-21 @ 06:27), Max: 98.7 (10-06-21 @ 21:32)  HR: 95 (10-07-21 @ 06:27) (95 - 109)  BP: 125/76 (10-07-21 @ 06:27) (110/63 - 125/76)  BP(mean): --  RR: 19 (10-07-21 @ 06:27) (17 - 19)  SpO2: 90% (10-07-21 @ 06:27) (90% - 90%)  I&O's Summary    06 Oct 2021 07:01  -  07 Oct 2021 07:00  --------------------------------------------------------  IN: 0 mL / OUT: 200 mL / NET: -200 mL        PHYSICAL EXAM:  Constitutional: NAD, comfortable in bed.  HEENT: NC/AT, PERRLA, EOMI, no conjunctival pallor or scleral icterus, MMM  Neck: Supple, no JVD  Respiratory: CTA B/L. No w/r/r.   Cardiovascular: RRR, normal S1 and S2, no m/r/g.   Gastrointestinal: +BS, soft NTND, no guarding or rebound tenderness, no palpable masses   Extremities: wwp; no cyanosis, clubbing or edema.   Vascular: Pulses equal and strong throughout.   Neurological: AAOx3, no CN deficits, strength and sensation intact throughout.   Skin: No gross skin abnormalities or rashes        LABS:                        11.1   13.76 )-----------( 189      ( 07 Oct 2021 07:02 )             34.2     10-06    139  |  106  |  29<H>  ----------------------------<  95  4.0   |  21<L>  |  0.69    Ca    10.7<H>      06 Oct 2021 07:23  Phos  2.4     10-06  Mg     2.0     10-06    TPro  5.7<L>  /  Alb  2.1<L>  /  TBili  0.5  /  DBili  x   /  AST  45<H>  /  ALT  23  /  AlkPhos  95  10-05            RADIOLOGY & ADDITIONAL TESTS:    MEDICATIONS  (STANDING):  aMIOdarone    Tablet 200 milliGRAM(s) Oral every 24 hours  apixaban 2.5 milliGRAM(s) Oral every 12 hours  atorvastatin 20 milliGRAM(s) Oral at bedtime  cefTRIAXone   IVPB 1000 milliGRAM(s) IV Intermittent every 24 hours  levothyroxine Injectable 50 MICROGram(s) IV Push at bedtime  memantine 10 milliGRAM(s) Oral two times a day  mirtazapine 7.5 milliGRAM(s) Oral at bedtime  pantoprazole  Injectable 40 milliGRAM(s) IV Push daily    MEDICATIONS  (PRN):  acetaminophen   Tablet .. 650 milliGRAM(s) Oral every 6 hours PRN Temp greater or equal to 38C (100.4F), Mild Pain (1 - 3)   OVERNIGHT EVENTS: One episode of afib w RVR yesterday around 2PM, s/p 1 x lopressor 12.5mg IV, now rate controlled. Restarted home meds amiodarone and eliquis 2.5 (adjusted for age and Cr). O2 requirements also increased from 2L to 6L, satting 90%.     SUBJECTIVE:  Patient seen and examined at bedside. Sleepy, unable to obtain further questioning.     Vital Signs Last 12 Hrs  T(F): 98.1 (10-07-21 @ 06:27), Max: 98.7 (10-06-21 @ 21:32)  HR: 95 (10-07-21 @ 06:27) (95 - 109)  BP: 125/76 (10-07-21 @ 06:27) (110/63 - 125/76)  BP(mean): --  RR: 19 (10-07-21 @ 06:27) (17 - 19)  SpO2: 90% (10-07-21 @ 06:27) (90% - 90%)  I&O's Summary    06 Oct 2021 07:01  -  07 Oct 2021 07:00  --------------------------------------------------------  IN: 0 mL / OUT: 200 mL / NET: -200 mL    PHYSICAL EXAM:  General: WDWN, in NAD, participatory on examination   HEENT: NC/AT; anicteric sclera; MM dry   Neck: supple without palpable nodularity, no JVD  Cardiovascular: +S1/S2; RRR  Respiratory: faintly appreciable bibasilar inspiratory crackles; no W/R/R  Gastrointestinal: soft, NT/ND; +BSx4  Extremities: WWP; no edema, clubbing or cyanosis, tenderness to palpation on anterior tibia B/L   Vascular: 2+ radial, DP pulses B/L  Neurological: AAOx2; no focal deficits    LABS:                        11.1   13.76 )-----------( 189      ( 07 Oct 2021 07:02 )             34.2     10-06    139  |  106  |  29<H>  ----------------------------<  95  4.0   |  21<L>  |  0.69    Ca    10.7<H>      06 Oct 2021 07:23  Phos  2.4     10-06  Mg     2.0     10-06    TPro  5.7<L>  /  Alb  2.1<L>  /  TBili  0.5  /  DBili  x   /  AST  45<H>  /  ALT  23  /  AlkPhos  95  10-05      RADIOLOGY & ADDITIONAL TESTS:    MEDICATIONS  (STANDING):  aMIOdarone    Tablet 200 milliGRAM(s) Oral every 24 hours  apixaban 2.5 milliGRAM(s) Oral every 12 hours  atorvastatin 20 milliGRAM(s) Oral at bedtime  cefTRIAXone   IVPB 1000 milliGRAM(s) IV Intermittent every 24 hours  levothyroxine Injectable 50 MICROGram(s) IV Push at bedtime  memantine 10 milliGRAM(s) Oral two times a day  mirtazapine 7.5 milliGRAM(s) Oral at bedtime  pantoprazole  Injectable 40 milliGRAM(s) IV Push daily    MEDICATIONS  (PRN):  acetaminophen   Tablet .. 650 milliGRAM(s) Oral every 6 hours PRN Temp greater or equal to 38C (100.4F), Mild Pain (1 - 3)

## 2021-10-07 NOTE — PROGRESS NOTE ADULT - PROBLEM SELECTOR PLAN 3
- on home eliquis 5 mg BID and amiodarone 200 mg QD  - hold for now and restart when pt's mental status improves  Plan:  - started lovenox 30mg q24h - on home eliquis 5 mg BID and amiodarone 200 mg QD    Plan:  - Staretd amidoarone 200mg qd  - Started eliquis 2.5mg bid (adjusted for age, wt, and Cr per pharmacy)

## 2021-10-07 NOTE — PROGRESS NOTE ADULT - PROBLEM SELECTOR PLAN 7
Hold home rosuvastatin 5 mg   - restart if mental status improves Home med rosuvastatin 5 mg   Plan:  Will start atorvastatin 10mg inpatient Home med rosuvastatin 5 mg   Plan:  Atorva 20mg

## 2021-10-07 NOTE — ADVANCED PRACTICE NURSE CONSULT - REASON FOR CONSULT
[facility] nurse consult to assess pressure injuries that were present on admission. The patient is a 89 y/o F with h/o Afib (on Eliquis/ Toprol), s/p DCCVin 2019,  dementia, hypothyroidism, GERD presented with altered mental status, generalized weakness x3 days.

## 2021-10-07 NOTE — ADVANCED PRACTICE NURSE CONSULT - RECOMMEDATIONS
Left hip, left buttock and sacral pressure injuries - cleanse with normal saline, apply Cavilon skin prep, cover with Allevyn foam dressing everyu other day or prn if soiled or saturated.   WOCN discussed assessment and recommendations with TARA Sena and house staff Dr Faria.

## 2021-10-07 NOTE — CONSULT NOTE ADULT - ATTENDING COMMENTS
Seen this afternoon with aide at bedside and discussed w Dr Marmolejo. 89yo demented woman hospitalized w AF, on Eliquis and toprol, with sara pleural effusions, w fever on admission and leukocytosis, w worsening RUL infiltrate since admission. Being diuresed, on antibiotics. We are asked about thoracentesis. These bilateral effusions are more likely due to volume overload. In other circumstances a thoracentesis to exclude parapneumonic effusion would be warranted, but in this frail, demented 89yo who is anticoagulated the risks/burden is greater than benefit. We will follow along but would favor no procedure at this time.

## 2021-10-07 NOTE — PROGRESS NOTE ADULT - SUBJECTIVE AND OBJECTIVE BOX
Given lasix last night for ? fluid overload  Barely responsive this am VS stable VR 90 Chest is clearer than last night No edema

## 2021-10-08 NOTE — PROGRESS NOTE ADULT - PROBLEM SELECTOR PLAN 3
- on home eliquis 5 mg BID and amiodarone 200 mg QD  - Rate controlled 90s - low 100s    Plan:  - Started amidoarone 200mg qd  - Started eliquis 2.5mg bid (adjusted for age, wt, and Cr per pharmacy)

## 2021-10-08 NOTE — PROVIDER CONTACT NOTE (OTHER) - RECOMMENDATIONS
Put pt on Non rebreather, come assess patient.
IV antibiotic ordered, Patient needing an IV access team aware.

## 2021-10-08 NOTE — PROGRESS NOTE ADULT - ASSESSMENT
89F with Afib (on Eliquis/ Toprol), s/p DCCV in 2019, dementia, hypothyroidism, GERD presenting with altered mental status, generalized weakness x3 days. Admitted for sepsis 2/2 pneumonia, hospital course c/b worsening hypoxia for which pulmonary team was consulted.    #Hypoxic respiratory failure  Not on home oxygen, initially requiring 2-3L nasal cannula. Oxygen requirements increasing, now on NRB to maintain O2sat ~95%. Previously HR rate-controlled in 60s-70s, now in afib with RVR (onset corresponding with worsening hypoxia). CXR with worsening pleural effusions and pulmonary congestion since admission. Etiology of worsening hypoxia most likely 2/2 pulmonary edema and fluid overload with concomitant aspiration pneumonia (Pt newly febrile to 102, RUL infiltrate on CXR).  -bedside ultrasound performed, bilateral b line pattern suggestive of pulmonary edema. large bilateral effusions, L>R, simple-appearing. Also with jagged pleura in RUL which may indicate pneumonia.  Recommendations:    -Increase diuresis, lasix 40mg IVP. Maintain strict I/O to monitor response to lasix.    -Repeat CXR in AM    -c/w zosyn 4.5 q6hrs for coverage of pseudomonas    -If possible, obtain sputum culture    -maintain aspiration precautions, elevate angle of bed    #Pleural effusions  Pt found to have worsening pleural effusions and pulmonary edema on CXR since admission. BNP 13,000. Pt without known history of heart failure however appears to be fluid overload. Bedside ultrasound with large bilateral effusions, L>R, simple-appearing. Ddx includes parapneumonic effusion however less likely as effusions are bilateral and L>R, and ?aspiration pneumonia appears to be on the R apical region. More likely 2/2 fluid overload due to fluid resuscitation i/s/o sepsis on admission, compounded by hypoalbuminemia (albumin 2.1 on admission).  Recommendations:    -plan as above for hypoxia      **final plan to be discussed on rounds** 89F with Afib (on Eliquis/ Toprol), s/p DCCV in 2019, dementia, hypothyroidism, GERD presenting with altered mental status, generalized weakness x3 days. Admitted for sepsis 2/2 pneumonia, hospital course c/b worsening hypoxia for which pulmonary team was consulted.    #Hypoxic respiratory failure  Not on home oxygen, initially requiring 2-3L nasal cannula. Oxygen requirements increasing, now on NRB to maintain O2sat ~95%. Previously HR rate-controlled in 60s-70s, now in afib with RVR (onset corresponding with worsening hypoxia). CXR with worsening pleural effusions and pulmonary congestion since admission. Etiology of worsening hypoxia most likely 2/2 pulmonary edema and fluid overload with concomitant aspiration pneumonia (Pt newly febrile to 102, RUL infiltrate on CXR).  -bedside ultrasound performed, bilateral b line pattern suggestive of pulmonary edema. large bilateral effusions, L>R, simple-appearing. Also with jagged pleura in RUL which may indicate pneumonia.  Recommendations:    -Increase diuresis, lasix 40mg IVP. Maintain strict I/O to monitor response to lasix.    -Repeat CXR in AM    -c/w zosyn 4.5 q6hrs for coverage of pseudomonas    -If possible, obtain sputum culture    -maintain aspiration precautions, elevate angle of bed    #Pleural effusions  Pt found to have worsening pleural effusions and pulmonary edema on CXR since admission. BNP 13,000. Pt without known history of heart failure however appears to be fluid overload. Bedside ultrasound with large bilateral effusions, L>R, simple-appearing. Ddx includes parapneumonic effusion however less likely as effusions are bilateral and L>R, and ?aspiration pneumonia appears to be on the R apical region. More likely 2/2 fluid overload due to fluid resuscitation i/s/o sepsis on admission, compounded by hypoalbuminemia (albumin 2.1 on admission).  Recommendations:    -plan as above for hypoxia    Case discussed with attending and primary team.  Will continue to follow. yes

## 2021-10-08 NOTE — PROGRESS NOTE ADULT - PROBLEM SELECTOR PLAN 9
F: None  E: replete prn  N: Pureed diet  DVT: lovenox 30mg SQ q24h   Code: DNR/DNI.  Dispo: Home with home PT

## 2021-10-08 NOTE — PROVIDER CONTACT NOTE (OTHER) - SITUATION
Patient has a temp of 99.9 ( axillary). Temperature reassessed rectally 102.2
on 6L NC Oxygen sat 85%; ; /72; temp axillary 98.1F.

## 2021-10-08 NOTE — CHART NOTE - NSCHARTNOTEFT_GEN_A_CORE
Admitting Diagnosis:   Patient is a 89y old  Female who presents with a chief complaint of sepsis (08 Oct 2021 08:07)      PAST MEDICAL & SURGICAL HISTORY:  Hypothyroid    Dementia    GERD (gastroesophageal reflux disease)    Atrial fibrillation    History of hip replacement, total, left        Current Nutrition Order :mechanical soft thin       PO Intake: Good (%) [   ]  Fair (50-75%) [   ] Poor (<25%) [   ]0% intake today    GI Issues: 10/8 fecal incontinence    Pain:controlled    Skin Integrity:Left hip DTI',Sacral stage 1 right hip DTI    Labs:   10-07    142  |  108  |  29<H>  ----------------------------<  169<H>  3.2<L>   |  26  |  0.73    Ca    10.2      07 Oct 2021 07:02  Phos  1.6     10-07  Mg     1.9     10-07      CAPILLARY BLOOD GLUCOSE          Medications:  MEDICATIONS  (STANDING):  aMIOdarone    Tablet 200 milliGRAM(s) Oral every 24 hours  apixaban 2.5 milliGRAM(s) Oral every 12 hours  atorvastatin 20 milliGRAM(s) Oral at bedtime  furosemide   Injectable 40 milliGRAM(s) IV Push once  levothyroxine Injectable 50 MICROGram(s) IV Push at bedtime  memantine 10 milliGRAM(s) Oral two times a day  metoprolol tartrate Injectable 5 milliGRAM(s) IV Push every 6 hours  mirtazapine 7.5 milliGRAM(s) Oral at bedtime  pantoprazole  Injectable 40 milliGRAM(s) IV Push daily  piperacillin/tazobactam IVPB. 4.5 Gram(s) IV Intermittent once  piperacillin/tazobactam IVPB.. 4.5 Gram(s) IV Intermittent every 12 hours    MEDICATIONS  (PRN):  acetaminophen   Tablet .. 650 milliGRAM(s) Oral every 6 hours PRN Temp greater or equal to 38C (100.4F), Mild Pain (1 - 3)      Weight:49.2kg(suspected inaccurate   Daily     Daily     Weight Change: no updated weights ,IBW:49.8kg,100% of IBW (if weights accurate)Per aide UBW:68kg    Estimated energy needs: IBW used due to suspected inaccurate bed scale readings, IBW:49.8kg [id number]:996-1245kcal and 1-1.2gmprotein:49.8-59.7gmprotein and fluids per team    Subjective: Pt is a 89 y/o F with h/o Afib (on Eliquis/ Toprol), s/p DCCV in 2019, dementia, hypothyroidism, GERD presenting with altered mental status. Pt admitted for hypoxic respiratory failure and sepsis 2/2 to possible PNA .Patient on NRB(94%).Not eating .Must be fed .Per MD ,prognosis grave.    Previous Nutrition Diagnosis :Inadequate oral intake r/t inability to meet EER 2/2 NPO 2/2 MS AEB:0% of EER met    Active [ x  ]  Resolved [   ]not NPO but po<25% max    If resolved, new PES:     Goal :Align nutrition with goals of care    Recommendations:1.Determine goals of care.may warrant comfort feeds order(RD will discuss in IDR's)  2.Encourage po if awake(needs total feeding)    Education: not indicated    Risk Level: High [  x ] Moderate [   ] Low [   ]

## 2021-10-08 NOTE — PROGRESS NOTE ADULT - ASSESSMENT
Appreciate pulm consult  No thorocentesis planned  To cont O2 antibiotics and lasix again today  Discussed grave prognosis with the patients daughters at length

## 2021-10-08 NOTE — PROGRESS NOTE ADULT - SUBJECTIVE AND OBJECTIVE BOX
**INCOMPLETE**     REASON FOR CONSULT: hypoxia    INTERVAL/OVERNIGHT EVENTS: Pt requiring nonrebreather overnight, weaned to 6L in AM. However requiring to return to NRB soon afterwards. Pt febrile to ~102 rectal.    SUBJECTIVE HPI: Patient seen and examined at bedside. pt on nonrebreather, somnolent unable to assess ROS.      MEDICATIONS  (STANDING):  aMIOdarone    Tablet 200 milliGRAM(s) Oral every 24 hours  apixaban 2.5 milliGRAM(s) Oral every 12 hours  atorvastatin 20 milliGRAM(s) Oral at bedtime  furosemide   Injectable 40 milliGRAM(s) IV Push once  levothyroxine Injectable 50 MICROGram(s) IV Push at bedtime  memantine 10 milliGRAM(s) Oral two times a day  metoprolol tartrate Injectable 5 milliGRAM(s) IV Push every 6 hours  mirtazapine 7.5 milliGRAM(s) Oral at bedtime  pantoprazole  Injectable 40 milliGRAM(s) IV Push daily  piperacillin/tazobactam IVPB. 4.5 Gram(s) IV Intermittent once  piperacillin/tazobactam IVPB.. 4.5 Gram(s) IV Intermittent every 12 hours    MEDICATIONS  (PRN):  acetaminophen   Tablet .. 650 milliGRAM(s) Oral every 6 hours PRN Temp greater or equal to 38C (100.4F), Mild Pain (1 - 3)      VITAL SIGNS:  Vital Signs Last 24 Hrs  T(C): 36.8 (08 Oct 2021 05:49), Max: 36.8 (08 Oct 2021 05:49)  T(F): 98.3 (08 Oct 2021 05:49), Max: 98.3 (08 Oct 2021 05:49)  HR: 100 (08 Oct 2021 05:49) (100 - 118)  BP: 118/57 (08 Oct 2021 05:49) (115/72 - 146/77)  BP(mean): --  RR: 20 (08 Oct 2021 05:49) (18 - 20)  SpO2: 95% (08 Oct 2021 05:49) (85% - 95%)      I&O's Detail  07 Oct 2021 07:01  -  08 Oct 2021 07:00  --------------------------------------------------------  IN:  Total IN: 0 mL    OUT:    Voided (mL): 800 mL  Total OUT: 800 mL    Total NET: -800 mL      PHYSICAL EXAM:  General: Frail, elderly, in NAD, not participatory on exam  HEENT: NC/AT; anicteric sclera; MM dry   Neck: supple without palpable nodularity, no JVD  Cardiovascular: +S1/S2; tachycardic  Respiratory: on NRB, faintly appreciable bibasilar inspiratory crackles; no W/R/R  Gastrointestinal: soft, NT/ND; +BSx4  Extremities: WWP; no edema, clubbing or cyanosis, tenderness to palpation on anterior tibia B/L   Vascular: 2+ radial, DP pulses B/L  Neurological: AAOx1; no focal deficits      LABS:                        13.0   10.73 )-----------( 166      ( 08 Oct 2021 10:48 )             42.0     10-08    144  |  105  |  26<H>  ----------------------------<  126<H>  3.8   |  28  |  0.76    Ca    9.7      08 Oct 2021 10:48  Phos  2.8     10-08  Mg     1.9     10-08      ABG - ( 07 Oct 2021 11:52 )  pH, Arterial: 7.45  pH, Blood: x     /  pCO2: 41    /  pO2: 62    / HCO3: 28    / Base Excess: 4.1   /  SaO2: 94.4        Microbiology:  Culture - Urine (collected 10-03-21 @ 22:18)  Source: Clean Catch Clean Catch (Midstream)  Final Report (10-05-21 @ 12:27):  No growth    Culture - Blood (collected 10-03-21 @ 15:52)  Source: .Blood Blood-Peripheral  Preliminary Report (10-07-21 @ 16:00):  No growth at 4 days.    Culture - Blood (collected 10-03-21 @ 15:52)  Source: .Blood Blood-Peripheral  Preliminary Report (10-07-21 @ 16:00):  No growth at 4 days.      RADIOLOGY & ADDITIONAL STUDIES: Reviewed. REASON FOR CONSULT: hypoxia    INTERVAL/OVERNIGHT EVENTS: Pt requiring nonrebreather overnight, weaned to 6L in AM. However requiring to return to NRB soon afterwards. Pt febrile to ~102 rectal.    SUBJECTIVE HPI: Patient seen and examined at bedside. pt on nonrebreather, somnolent unable to assess ROS.      MEDICATIONS  (STANDING):  aMIOdarone    Tablet 200 milliGRAM(s) Oral every 24 hours  apixaban 2.5 milliGRAM(s) Oral every 12 hours  atorvastatin 20 milliGRAM(s) Oral at bedtime  furosemide   Injectable 40 milliGRAM(s) IV Push once  levothyroxine Injectable 50 MICROGram(s) IV Push at bedtime  memantine 10 milliGRAM(s) Oral two times a day  metoprolol tartrate Injectable 5 milliGRAM(s) IV Push every 6 hours  mirtazapine 7.5 milliGRAM(s) Oral at bedtime  pantoprazole  Injectable 40 milliGRAM(s) IV Push daily  piperacillin/tazobactam IVPB. 4.5 Gram(s) IV Intermittent once  piperacillin/tazobactam IVPB.. 4.5 Gram(s) IV Intermittent every 12 hours    MEDICATIONS  (PRN):  acetaminophen   Tablet .. 650 milliGRAM(s) Oral every 6 hours PRN Temp greater or equal to 38C (100.4F), Mild Pain (1 - 3)      VITAL SIGNS:  Vital Signs Last 24 Hrs  T(C): 36.8 (08 Oct 2021 05:49), Max: 36.8 (08 Oct 2021 05:49)  T(F): 98.3 (08 Oct 2021 05:49), Max: 98.3 (08 Oct 2021 05:49)  HR: 100 (08 Oct 2021 05:49) (100 - 118)  BP: 118/57 (08 Oct 2021 05:49) (115/72 - 146/77)  BP(mean): --  RR: 20 (08 Oct 2021 05:49) (18 - 20)  SpO2: 95% (08 Oct 2021 05:49) (85% - 95%)      I&O's Detail  07 Oct 2021 07:01  -  08 Oct 2021 07:00  --------------------------------------------------------  IN:  Total IN: 0 mL    OUT:    Voided (mL): 800 mL  Total OUT: 800 mL    Total NET: -800 mL      PHYSICAL EXAM:  General: Frail, elderly, in NAD, not participatory on exam  HEENT: NC/AT; anicteric sclera; MM dry   Neck: supple without palpable nodularity, no JVD  Cardiovascular: +S1/S2; tachycardic  Respiratory: on NRB, faintly appreciable bibasilar inspiratory crackles; no W/R/R  Gastrointestinal: soft, NT/ND; +BSx4  Extremities: WWP; no edema, clubbing or cyanosis, tenderness to palpation on anterior tibia B/L   Vascular: 2+ radial, DP pulses B/L  Neurological: AAOx1; no focal deficits      LABS:                        13.0   10.73 )-----------( 166      ( 08 Oct 2021 10:48 )             42.0     10-08    144  |  105  |  26<H>  ----------------------------<  126<H>  3.8   |  28  |  0.76    Ca    9.7      08 Oct 2021 10:48  Phos  2.8     10-08  Mg     1.9     10-08      ABG - ( 07 Oct 2021 11:52 )  pH, Arterial: 7.45  pH, Blood: x     /  pCO2: 41    /  pO2: 62    / HCO3: 28    / Base Excess: 4.1   /  SaO2: 94.4        Microbiology:  Culture - Urine (collected 10-03-21 @ 22:18)  Source: Clean Catch Clean Catch (Midstream)  Final Report (10-05-21 @ 12:27):  No growth    Culture - Blood (collected 10-03-21 @ 15:52)  Source: .Blood Blood-Peripheral  Preliminary Report (10-07-21 @ 16:00):  No growth at 4 days.    Culture - Blood (collected 10-03-21 @ 15:52)  Source: .Blood Blood-Peripheral  Preliminary Report (10-07-21 @ 16:00):  No growth at 4 days.      RADIOLOGY & ADDITIONAL STUDIES: Reviewed.

## 2021-10-08 NOTE — PROVIDER CONTACT NOTE (OTHER) - ACTION/TREATMENT ORDERED:
Patient needs IV access- unsuccessful by nursing staff. Team aware. Pending ultrasound for IV placement.
md. will come assess patient. Interventions ordered, will continue to monitor pt.

## 2021-10-08 NOTE — PROGRESS NOTE ADULT - PROBLEM SELECTOR PLAN 2
Pt met sepsis criteria on admission with fever 102.4 and wbc of 16.14. Lactate wnl at 1.6. Has been afebrile since admission.   - possible pulmonary source as BCx and UCx NGTD  - s/p ceftriaxone and azithro in ED, 1 L NS, and tylenol  - CXR showed bilateral opacities, b/l pleural effusions (unchanged from previous), new R pleural effusion  - azithro D/C'd i/s/o negative urine legionella  - WBC 14->12->13  - MRSA neg, staph neg    Plan:  - c/w CFX 1g q24h x 5d (10/3-)  - Started zosyn 4.5g (10/7 -)

## 2021-10-08 NOTE — PROGRESS NOTE ADULT - SUBJECTIVE AND OBJECTIVE BOX
Physical Medicine and Rehabilitation Progress Note:    Patient is a 89y old  Female who presents with a chief complaint of sepsis (08 Oct 2021 08:07)      HPI:  Pt is a 89 y/o F with h/o Afib (on Eliquis/ Toprol), s/p DCCVin 2019,  dementia, hypothyroidism, GERD presenting with altered mental status, generalized weakness x3 days. Daughters at bedside reports pt with decreased PO intake. Reports tactile fevers and chills however oral temperature was normal. Per family, pt at baseline is A&Ox1-2, ?nonverbal. Has had progressive decline the past 2 years.  Pt sees Dr. Marmolejo and was told to come to ED for further workup and management. Unable to obtain ROS from pt or HPI.      In ED, vitals T 102.4 rectal, HR 72, /82, RR 19, O2 94% on NRB --> 100% on 3L NC  Labs significant for wbc 16.14, INR 1.93, trop 0.02, BNP 13,000  CXR: ?b/l opacities, pulmonary congestion  EKG: AF  ED management: Ceftriaxone x1, azithro x1, 1 L NS, tylenol (03 Oct 2021 17:28)                            13.0   10.73 )-----------( 166      ( 08 Oct 2021 10:48 )             42.0       10-07    142  |  108  |  29<H>  ----------------------------<  169<H>  3.2<L>   |  26  |  0.73    Ca    10.2      07 Oct 2021 07:02  Phos  1.6     10-07  Mg     1.9     10-07      Vital Signs Last 24 Hrs  T(C): 36.8 (08 Oct 2021 05:49), Max: 36.8 (08 Oct 2021 05:49)  T(F): 98.3 (08 Oct 2021 05:49), Max: 98.3 (08 Oct 2021 05:49)  HR: 100 (08 Oct 2021 05:49) (100 - 118)  BP: 118/57 (08 Oct 2021 05:49) (115/72 - 146/77)  BP(mean): --  RR: 20 (08 Oct 2021 05:49) (18 - 20)  SpO2: 95% (08 Oct 2021 05:49) (85% - 95%)    MEDICATIONS  (STANDING):  aMIOdarone    Tablet 200 milliGRAM(s) Oral every 24 hours  apixaban 2.5 milliGRAM(s) Oral every 12 hours  atorvastatin 20 milliGRAM(s) Oral at bedtime  furosemide   Injectable 40 milliGRAM(s) IV Push once  levothyroxine Injectable 50 MICROGram(s) IV Push at bedtime  memantine 10 milliGRAM(s) Oral two times a day  metoprolol tartrate Injectable 5 milliGRAM(s) IV Push every 6 hours  mirtazapine 7.5 milliGRAM(s) Oral at bedtime  pantoprazole  Injectable 40 milliGRAM(s) IV Push daily  piperacillin/tazobactam IVPB. 4.5 Gram(s) IV Intermittent once  piperacillin/tazobactam IVPB.. 4.5 Gram(s) IV Intermittent every 12 hours    MEDICATIONS  (PRN):  acetaminophen   Tablet .. 650 milliGRAM(s) Oral every 6 hours PRN Temp greater or equal to 38C (100.4F), Mild Pain (1 - 3)    Currently Undergoing Physical/ Occupational Therapy at bedside.    Functional Status Assessment:   10/7/2021      Cognitive/Perceptual/Neuro  Cognitive/Neuro/Behavioral [WDL Definition: Alert; opens eyes spontaneously; arouses to voice or touch; oriented x 4; follows commands; speech spontaneous, logical; purposeful motor response; behavior appropriate to situation]: WDL except  Level of Consciousness: lethargic;  confused  Arousal Level: arouses to repeated stimulation  Orientation: unable to assess, no indication  Speech: non verbal  Mood/Behavior: calm    Language Assistance  Preferred Language to Address Healthcare Preferred Language to Address Healthcare: English    Therapeutic Interventions      Bed Mobility  Bed Mobility Training Rolling/Turning: maximum assist (25% patient effort);  1 person assist;  set-up required  Bed Mobility Training Scootin person assist;  set-up required;  maximum assist (25% patient effort)  Bed Mobility Training Sit-to-Supine: maximum assist (25% patient effort);  2 person assist;  verbal cues;  set-up required  Bed Mobility Training Supine-to-Sit: maximum assist (25% patient effort);  2 person assist;  set-up required  Bed Mobility Training Limitations: decreased ability to use arms for pushing/pulling;  decreased ability to use legs for bridging/pushing;  impaired ability to control trunk for mobility;  impaired balance;  impaired postural control;  decreased flexibility;  decreased ROM;  cognitive, decreased safety awareness    Therapeutic Exercise  Therapeutic Exercise Detail: Pt dangle ~6 min with maxA, pt with BUE use to assist in self prop, AAROM shoulder flexion/extension x10ea, PROM knee extension x10 ea. Pt open eyes x1 in supine, eyes remain closed throughout rest of session.           PM&R Impression: as above    Current Disposition Plan Recommendations:   d/c home with home physical therapy, 24 hr x 7 day assistance

## 2021-10-08 NOTE — PROGRESS NOTE ADULT - ASSESSMENT
per Internal Medicine    89 y/o F with h/o Afib (on Eliquis/ Toprol), s/p DCCV in 2019, dementia, hypothyroidism, GERD presenting with altered mental status. Pt admitted for hypoxic respiratory failure and sepsis 2/2 to possible pna.    Problem/Plan - 1:  ·  Problem: Acute respiratory failure with hypoxia.   ·  Plan: Possibly 2/2 to pna vs fluid overload however pt appears dry on exam.  Last TTE 6/2020 w/ LVEF 72%.   - TTE 10/7: EF 65%, bilateral pleural effusion, new R sided pleural effusion  - S/p lasix 40mg IV to r/o pulmonary edema    Plan:  - O2 requirements increased from 6L to NRB  - continue with CTX  - Started zosyn 4.5g 10/7.    Problem/Plan - 2:  ·  Problem: Sepsis.   ·  Plan: Pt met sepsis criteria on admission with fever 102.4 and wbc of 16.14. Lactate wnl at 1.6. Has been afebrile since admission.   - possible pulmonary source as BCx and UCx NGTD  - s/p ceftriaxone and azithro in ED, 1 L NS, and tylenol  - CXR showed bilateral opacities, b/l pleural effusions (unchanged from previous), new R pleural effusion  - azithro D/C'd i/s/o negative urine legionella  - WBC 14->12->13  - MRSA neg, staph neg    Plan:  - c/w CFX 1g q24h x 5d (10/3-)  - Started zosyn 4.5g (10/7 -).    Problem/Plan - 3:  ·  Problem: Atrial fibrillation.   ·  Plan: - on home eliquis 5 mg BID and amiodarone 200 mg QD  - Rate controlled 90s - low 100s    Plan:  - Started amidoarone 200mg qd  - Started eliquis 2.5mg bid (adjusted for age, wt, and Cr per pharmacy).    Problem/Plan - 4:  ·  Problem: Hypothyroidism.   ·  Plan: - on home levothyroxine 75 mcg    Plan:  - C/w IV levothyroxine 50 mcg QD  - f/u TSH.    Problem/Plan - 5:  ·  Problem: GERD (gastroesophageal reflux disease).   ·  Plan: - on home dexilant 60 mg QD  - protonix 40 mg interchange IV.    Problem/Plan - 6:  ·  Problem: Dementia.   ·  Plan: - hold home namenda 10 mg BID as pt is NPO  - restart if mental status improves.    Problem/Plan - 7:  ·  Problem: Hyperlipidemia.   ·  Plan: Home med rosuvastatin 5 mg   Plan:  Atorva 20mg.    Problem/Plan - 8:  ·  Problem: Anxiety and depression.   ·  Plan: Home mirtazipine 7.5 mg QD  C/w mirtazapine 7.5.    Problem/Plan - 9:  ·  Problem: Nutrition, metabolism, and development symptoms.   ·  Plan: F: None  E: replete prn  N: Pureed diet  DVT: lovenox 30mg SQ q24h   Code: DNR/DNI.

## 2021-10-08 NOTE — PROGRESS NOTE ADULT - ATTENDING COMMENTS
Had a fever. Otherwise the same. I do not recommend an invasive procedure here. Would cont w [facility] but prognosis is grave for this patient. D/w Dr Marmolejo.

## 2021-10-08 NOTE — PROGRESS NOTE ADULT - PROBLEM SELECTOR PLAN 1
Possibly 2/2 to pna vs fluid overload however pt appears dry on exam.  Last TTE 6/2020 w/ LVEF 72%.   - TTE 10/7: EF 65%, bilateral pleural effusion, new R sided pleural effusion  - S/p lasix 40mg IV to r/o pulmonary edema    Plan:  - O2 requirements increased from 6L to NRB  - continue with CTX  - Started zosyn 4.5g 10/7

## 2021-10-08 NOTE — PROVIDER CONTACT NOTE (OTHER) - BACKGROUND
PMH of Afib, Dementia, Hypothyroidism, GERD who was admitted for hypoxic respiratory failure and sepsis secondary for possible pneumonia

## 2021-10-08 NOTE — PROGRESS NOTE ADULT - SUBJECTIVE AND OBJECTIVE BOX
OVERNIGHT EVENTS: S/p lasix x2. Pt also desatted to 80s% on NC so increased to NRB and kept on NRB overnight with O2 improvement to 90s%. Also tachy to 130s HR at the time, s/p 1x lopressor 5mg IV. Patient reportedly not taking PO amiodarone so lopressor 5mg IV q6hrs started. Dr. Marmolejo discussed grave prognosis with daughter at length. ABG obtained yesterday was normal pH, showed mild hypercapnia.     SUBJECTIVE:  Patient seen and examined at bedside. Unable to obtain further questioning as patient is AAox1 and only mildly responsive.     Vital Signs Last 12 Hrs  T(F): 98.3 (10-08-21 @ 05:49), Max: 98.3 (10-08-21 @ 05:49)  HR: 100 (10-08-21 @ 05:49) (100 - 118)  BP: 118/57 (10-08-21 @ 05:49) (115/72 - 118/57)  BP(mean): --  RR: 20 (10-08-21 @ 05:49) (20 - 20)  SpO2: 95% (10-08-21 @ 05:49) (85% - 95%)  I&O's Summary    07 Oct 2021 07:01  -  08 Oct 2021 07:00  --------------------------------------------------------  IN: 0 mL / OUT: 800 mL / NET: -800 mL    PHYSICAL EXAM:  General: Frail, elderly, in NAD, not participatory on exam  HEENT: NC/AT; anicteric sclera; MM dry   Neck: supple without palpable nodularity, no JVD  Cardiovascular: +S1/S2; tachycardic  Respiratory: faintly appreciable bibasilar inspiratory crackles; no W/R/R  Gastrointestinal: soft, NT/ND; +BSx4  Extremities: WWP; no edema, clubbing or cyanosis, tenderness to palpation on anterior tibia B/L   Vascular: 2+ radial, DP pulses B/L  Neurological: AAOx1; no focal deficits      LABS:                        11.1   13.76 )-----------( 189      ( 07 Oct 2021 07:02 )             34.2     10-07    142  |  108  |  29<H>  ----------------------------<  169<H>  3.2<L>   |  26  |  0.73    Ca    10.2      07 Oct 2021 07:02  Phos  1.6     10-07  Mg     1.9     10-07      RADIOLOGY & ADDITIONAL TESTS:    MEDICATIONS  (STANDING):  aMIOdarone    Tablet 200 milliGRAM(s) Oral every 24 hours  apixaban 2.5 milliGRAM(s) Oral every 12 hours  atorvastatin 20 milliGRAM(s) Oral at bedtime  levothyroxine Injectable 50 MICROGram(s) IV Push at bedtime  memantine 10 milliGRAM(s) Oral two times a day  metoprolol tartrate Injectable 5 milliGRAM(s) IV Push every 6 hours  mirtazapine 7.5 milliGRAM(s) Oral at bedtime  pantoprazole  Injectable 40 milliGRAM(s) IV Push daily  piperacillin/tazobactam IVPB. 4.5 Gram(s) IV Intermittent once  piperacillin/tazobactam IVPB.. 4.5 Gram(s) IV Intermittent every 12 hours    MEDICATIONS  (PRN):  acetaminophen   Tablet .. 650 milliGRAM(s) Oral every 6 hours PRN Temp greater or equal to 38C (100.4F), Mild Pain (1 - 3)

## 2021-10-09 NOTE — PROGRESS NOTE ADULT - PROBLEM SELECTOR PLAN 4
- on home levothyroxine 75 mcg    Plan:  - C/w IV levothyroxine 50 mcg QD  - f/u TSH - on home levothyroxine 75 mcg  Plan:  - C/w IV levothyroxine 50 mcg QD  - f/u TSH

## 2021-10-09 NOTE — PROGRESS NOTE ADULT - PROBLEM SELECTOR PLAN 3
- on home eliquis 5 mg BID and amiodarone 200 mg QD  - Rate controlled 90s - low 100s    Plan:  - Started amidoarone 200mg qd  - Started eliquis 2.5mg bid (adjusted for age, wt, and Cr per pharmacy) - on home eliquis 5 mg BID and amiodarone 200 mg QD  - Rate controlled 90s - low 100s  Plan:  - Started amidoarone 200mg qd  - c/w eliquis 2.5mg bid (adjusted for age, wt, and Cr per pharmacy) if mental status improves

## 2021-10-09 NOTE — PROGRESS NOTE ADULT - REASON FOR ADMISSION
sepsis

## 2021-10-09 NOTE — PROGRESS NOTE ADULT - PROVIDER SPECIALTY LIST ADULT
Internal Medicine
Pulmonology
Rehab Medicine
Internal Medicine

## 2021-10-09 NOTE — PROGRESS NOTE ADULT - SUBJECTIVE AND OBJECTIVE BOX
OVERNIGHT EVENTS: Pt increasingly lethargic o/n and unable to receive PO meds.     SUBJECTIVE / INTERVAL HPI: Patient seen and examined at bedside. Pt opens eyes to stimuli but not communicative.     VITAL SIGNS:  Vital Signs Last 24 Hrs  T(C): 38.3 (09 Oct 2021 07:34), Max: 39 (08 Oct 2021 10:52)  T(F): 101 (09 Oct 2021 07:34), Max: 102.2 (08 Oct 2021 10:52)  HR: 117 (09 Oct 2021 06:32) (84 - 120)  BP: 150/81 (09 Oct 2021 06:32) (94/59 - 150/81)  BP(mean): --  RR: 19 (09 Oct 2021 06:32) (16 - 21)  SpO2: 93% (09 Oct 2021 06:32) (91% - 96%)    PHYSICAL EXAM:    General: Frail, elderly, in NAD, pt opens eyes to stimuli  HEENT: NC/AT; anicteric sclera; MM dry   Neck: supple without palpable nodularity, no JVD  Cardiovascular: +S1/S2; tachycardic  Respiratory: faintly appreciable bibasilar inspiratory crackles; tachypneic  Gastrointestinal: soft, NT/ND; +BSx4  Extremities: WWP; no edema, clubbing or cyanosis, tenderness to palpation on anterior tibia B/L   Vascular: 2+ radial, DP pulses B/L  Neurological: AAOx0; no focal deficits    MEDICATIONS:  MEDICATIONS  (STANDING):  acetaminophen  IVPB .. 1000 milliGRAM(s) IV Intermittent once  aMIOdarone    Tablet 200 milliGRAM(s) Oral every 24 hours  apixaban 2.5 milliGRAM(s) Oral every 12 hours  atorvastatin 20 milliGRAM(s) Oral at bedtime  levothyroxine Injectable 50 MICROGram(s) IV Push at bedtime  memantine 10 milliGRAM(s) Oral two times a day  metoprolol tartrate Injectable 5 milliGRAM(s) IV Push every 6 hours  mirtazapine 7.5 milliGRAM(s) Oral at bedtime  pantoprazole  Injectable 40 milliGRAM(s) IV Push daily  piperacillin/tazobactam IVPB.. 4.5 Gram(s) IV Intermittent every 12 hours    MEDICATIONS  (PRN):  acetaminophen   Tablet .. 650 milliGRAM(s) Oral every 6 hours PRN Temp greater or equal to 38C (100.4F), Mild Pain (1 - 3)  morphine  - Injectable 2 milliGRAM(s) IV Push every 4 hours PRN RR > 20      ALLERGIES:  Allergies    No Known Allergies    Intolerances        LABS:                        13.0   10.73 )-----------( 166      ( 08 Oct 2021 10:48 )             42.0     10-08    144  |  105  |  26<H>  ----------------------------<  126<H>  3.8   |  28  |  0.76    Ca    9.7      08 Oct 2021 10:48  Phos  2.8     10-08  Mg     1.9     10-08          CAPILLARY BLOOD GLUCOSE          RADIOLOGY & ADDITIONAL TESTS: Reviewed.

## 2021-10-09 NOTE — GOALS OF CARE CONVERSATION - ADVANCED CARE PLANNING - CONVERSATION DETAILS
Pt's family was informed about Ms. Chinchilla's increasing O2 requirements, continued fevers, worsening CXR, worsening mental status and hypotension indicating sepsis 2/2 PNA. Daughters were made aware of pt's grave prognosis. They wish for Ms. Chinchilla to remain DNR/DNI. They do not want central lines placed in the event that the pt remains hypotensive. However, they wish to pursue treatment options that include escalating antibiotics, IV fluids, checking blood work. Discussed with HCPs about the pt's current medical status and poor prognosis. Pt's family was informed about Ms. Chinchilla's increasing O2 requirements, continued fevers, worsening CXR, worsening mental status and hypotension indicating sepsis 2/2 PNA. Due to worsening patient's mental status, medical decisions were made by health care proxies Fredi and Akiko Chinchilla (pt's daughters). HCPs were made aware of pt's grave prognosis. They wish for Ms. Chinchilla to remain DNR/DNI. They do not want to pursue further escalation of clinical care including pressors, ICU, and telemetry. However, they wish to pursue treatment options that include escalating antibiotics, IV fluids, checking blood work. Current plan is to continue antibiotics and HFNC. Palliative on board and will continue to provide support services.

## 2021-10-09 NOTE — PROGRESS NOTE ADULT - PROBLEM SELECTOR PLAN 2
Pt met sepsis criteria on admission with fever 102.4 and wbc of 16.14. Lactate wnl at 1.6. Has been afebrile since admission.   - possible pulmonary source as BCx and UCx NGTD  - s/p ceftriaxone and azithro in ED, 1 L NS, and tylenol  - CXR showed bilateral opacities, b/l pleural effusions (unchanged from previous), new R pleural effusion  - azithro D/C'd i/s/o negative urine legionella  - WBC 14->12->13  - MRSA neg, staph neg    Plan:  - c/w CFX 1g q24h x 5d (10/3-)  - Started zosyn 4.5g (10/7 -) Pt met sepsis criteria on admission with fever 102.4 and wbc of 16.14. Lactate wnl at 1.6. Has been afebrile since admission.   - possible pulmonary source as BCx and UCx NGTD  - CXR shows worsening b/l infiltrate  - WBC uptrending  - MRSA neg, staph neg  - c/w zosyn 4.5g (10/7 -)

## 2021-10-09 NOTE — PROGRESS NOTE ADULT - PROBLEM SELECTOR PLAN 1
Possibly 2/2 to pna vs fluid overload however pt appears dry on exam.  Last TTE 6/2020 w/ LVEF 72%.   - TTE 10/7: EF 65%, bilateral pleural effusion, new R sided pleural effusion  - S/p lasix 40mg IV to r/o pulmonary edema  - O2 requirements increased from 6L to NRB  - will start HFNC  - continue with CTX  - Started zosyn 4.5g 10/7  - CXR shows worsening b/l infiltrate  - GOC discussion with daughters Possibly 2/2 to pna vs fluid overload however pt appears dry on exam. S/p lasix 40mg IV to r/o pulmonary edema. O2 requirements increased from 6L to NRB. Last TTE 6/2020 w/ LVEF 72%. TTE 10/7: EF 65%, bilateral pleural effusion, new R sided pleural effusion  - will start HFNC  - Started zosyn 4.5g 10/7  - CXR shows worsening b/l infiltrate  - GOC discussion with daughters

## 2021-10-09 NOTE — PROGRESS NOTE ADULT - PROBLEM SELECTOR PLAN 6
- hold home namenda 10 mg BID as pt is NPO  - restart if mental status improves

## 2021-10-09 NOTE — PROGRESS NOTE ADULT - PROBLEM SELECTOR PLAN 5
- on home dexilant 60 mg QD  - protonix 40 mg interchange IV

## 2021-10-10 NOTE — PROVIDER CONTACT NOTE (CHANGE IN STATUS NOTIFICATION) - ASSESSMENT
Patient found with High flow nasal cannula pulled off and lying on top of her chest. Patient pale and unresponsive. Unable to obtain vitals.

## 2021-10-10 NOTE — DISCHARGE NOTE FOR THE EXPIRED PATIENT - HOSPITAL COURSE
89yo F with h/o Afib (on Eliquis/ Toprol), s/p DCCV in 2019, dementia, hypothyroidism, GERD who presented with altered mental status. Pt was admitted for hypoxic respiratory failure and sepsis 2/2 to possible PNA. CXR on admission revealed new left-sided pleural effusion & underlying left lower lobe pneumonia and/or atelectasis. Patient initiated on Ceftriaxone 1g q24h x 4d until 10/8. Patient became hypoxic requiring nonrebreather and tachycardic in Afib w/ RVR requiring lopressor. Started antibiotics for suspected PNA. Patient's O2 requirements continually increased, she developed fevers, persistent tachycardia and hypotension non-responsive to fluid boluses, and her CXR showed worsening b/l infiltrates, indicating sepsis 2/2 PNA.  Resident was paged at 5:15am and informed that the patient had . When assessed at bedside, patient's heart and lung sounds were absent and she had no radial pulses or corneal reflexes. Daughter Fredi Chinchilla was notified by phone.  No spontaneous movements were present. There was not response to verbal, tactile, or painful stimuli. Pupils were mid-dilated and fixed. No breath sounds were appreciated over both lung fields. No femoral, radial, or carotid pulses were palpable. No heart sounds were auscultated over entire precordium. Patient pronounced dead at 5:21am. Family and attending physician were notified.

## 2021-10-10 NOTE — PROVIDER CONTACT NOTE (CHANGE IN STATUS NOTIFICATION) - ACTION/TREATMENT ORDERED:
Dr Lee and Dr Riggins came and evaluated patient and patient pronounced  at 5:21AM. AND Ray notified.

## 2021-10-13 LAB
CULTURE RESULTS: SIGNIFICANT CHANGE UP
SPECIMEN SOURCE: SIGNIFICANT CHANGE UP

## 2021-10-15 DIAGNOSIS — K21.9 GASTRO-ESOPHAGEAL REFLUX DISEASE WITHOUT ESOPHAGITIS: ICD-10-CM

## 2021-10-15 DIAGNOSIS — I48.91 UNSPECIFIED ATRIAL FIBRILLATION: ICD-10-CM

## 2021-10-15 DIAGNOSIS — L89.226 PRESSURE-INDUCED DEEP TISSUE DAMAGE OF LEFT HIP: ICD-10-CM

## 2021-10-15 DIAGNOSIS — G93.40 ENCEPHALOPATHY, UNSPECIFIED: ICD-10-CM

## 2021-10-15 DIAGNOSIS — E03.9 HYPOTHYROIDISM, UNSPECIFIED: ICD-10-CM

## 2021-10-15 DIAGNOSIS — R06.02 SHORTNESS OF BREATH: ICD-10-CM

## 2021-10-15 DIAGNOSIS — F41.8 OTHER SPECIFIED ANXIETY DISORDERS: ICD-10-CM

## 2021-10-15 DIAGNOSIS — J18.9 PNEUMONIA, UNSPECIFIED ORGANISM: ICD-10-CM

## 2021-10-15 DIAGNOSIS — L89.216 PRESSURE-INDUCED DEEP TISSUE DAMAGE OF RIGHT HIP: ICD-10-CM

## 2021-10-15 DIAGNOSIS — A41.9 SEPSIS, UNSPECIFIED ORGANISM: ICD-10-CM

## 2021-10-15 DIAGNOSIS — J96.01 ACUTE RESPIRATORY FAILURE WITH HYPOXIA: ICD-10-CM

## 2021-10-15 DIAGNOSIS — J90 PLEURAL EFFUSION, NOT ELSEWHERE CLASSIFIED: ICD-10-CM

## 2021-10-15 DIAGNOSIS — E78.5 HYPERLIPIDEMIA, UNSPECIFIED: ICD-10-CM

## 2021-10-15 DIAGNOSIS — Z86.73 PERSONAL HISTORY OF TRANSIENT ISCHEMIC ATTACK (TIA), AND CEREBRAL INFARCTION WITHOUT RESIDUAL DEFICITS: ICD-10-CM

## 2021-10-15 DIAGNOSIS — Z66 DO NOT RESUSCITATE: ICD-10-CM

## 2021-10-15 DIAGNOSIS — E87.79 OTHER FLUID OVERLOAD: ICD-10-CM

## 2021-10-15 DIAGNOSIS — R53.81 OTHER MALAISE: ICD-10-CM

## 2021-10-15 DIAGNOSIS — F03.90 UNSPECIFIED DEMENTIA WITHOUT BEHAVIORAL DISTURBANCE: ICD-10-CM

## 2021-10-26 PROCEDURE — 83521 IG LIGHT CHAINS FREE EACH: CPT

## 2021-10-26 PROCEDURE — 85025 COMPLETE CBC W/AUTO DIFF WBC: CPT

## 2021-10-26 PROCEDURE — 82803 BLOOD GASES ANY COMBINATION: CPT

## 2021-10-26 PROCEDURE — 0225U NFCT DS DNA&RNA 21 SARSCOV2: CPT

## 2021-10-26 PROCEDURE — 85610 PROTHROMBIN TIME: CPT

## 2021-10-26 PROCEDURE — 82962 GLUCOSE BLOOD TEST: CPT

## 2021-10-26 PROCEDURE — 85730 THROMBOPLASTIN TIME PARTIAL: CPT

## 2021-10-26 PROCEDURE — 81001 URINALYSIS AUTO W/SCOPE: CPT

## 2021-10-26 PROCEDURE — 96374 THER/PROPH/DIAG INJ IV PUSH: CPT

## 2021-10-26 PROCEDURE — 83735 ASSAY OF MAGNESIUM: CPT

## 2021-10-26 PROCEDURE — 87449 NOS EACH ORGANISM AG IA: CPT

## 2021-10-26 PROCEDURE — 83605 ASSAY OF LACTIC ACID: CPT

## 2021-10-26 PROCEDURE — 71045 X-RAY EXAM CHEST 1 VIEW: CPT

## 2021-10-26 PROCEDURE — 87040 BLOOD CULTURE FOR BACTERIA: CPT

## 2021-10-26 PROCEDURE — 97161 PT EVAL LOW COMPLEX 20 MIN: CPT

## 2021-10-26 PROCEDURE — 36415 COLL VENOUS BLD VENIPUNCTURE: CPT

## 2021-10-26 PROCEDURE — 87640 STAPH A DNA AMP PROBE: CPT

## 2021-10-26 PROCEDURE — 83880 ASSAY OF NATRIURETIC PEPTIDE: CPT

## 2021-10-26 PROCEDURE — 84484 ASSAY OF TROPONIN QUANT: CPT

## 2021-10-26 PROCEDURE — 80048 BASIC METABOLIC PNL TOTAL CA: CPT

## 2021-10-26 PROCEDURE — 87641 MR-STAPH DNA AMP PROBE: CPT

## 2021-10-26 PROCEDURE — 87086 URINE CULTURE/COLONY COUNT: CPT

## 2021-10-26 PROCEDURE — 84132 ASSAY OF SERUM POTASSIUM: CPT

## 2021-10-26 PROCEDURE — 96375 TX/PRO/DX INJ NEW DRUG ADDON: CPT

## 2021-10-26 PROCEDURE — 84100 ASSAY OF PHOSPHORUS: CPT

## 2021-10-26 PROCEDURE — 99285 EMERGENCY DEPT VISIT HI MDM: CPT

## 2021-10-26 PROCEDURE — 85027 COMPLETE CBC AUTOMATED: CPT

## 2021-10-26 PROCEDURE — 82330 ASSAY OF CALCIUM: CPT

## 2021-10-26 PROCEDURE — 97530 THERAPEUTIC ACTIVITIES: CPT

## 2021-10-26 PROCEDURE — 87635 SARS-COV-2 COVID-19 AMP PRB: CPT

## 2021-10-26 PROCEDURE — 86769 SARS-COV-2 COVID-19 ANTIBODY: CPT

## 2021-10-26 PROCEDURE — 82595 ASSAY OF CRYOGLOBULIN: CPT

## 2021-10-26 PROCEDURE — 84295 ASSAY OF SERUM SODIUM: CPT

## 2021-10-26 PROCEDURE — 93306 TTE W/DOPPLER COMPLETE: CPT

## 2021-10-26 PROCEDURE — 80053 COMPREHEN METABOLIC PANEL: CPT

## 2022-01-10 NOTE — DIETITIAN INITIAL EVALUATION ADULT. - NUTRITIONGOAL OUTCOME1
Regarding: [place] Returning office phone call   ----- Message from Allison Chilel sent at 1/10/2022  5:13 PM CST -----  Patient Name: Thais Crespo    Full Name of Provider seen for current symptoms: Quincy Hauser MD    Symptoms:  Returning office phone call     Pregnant (If Yes, how long?):     Call Back #: [phone]    Call Center Account # for provider seen for current symptoms: [id number]    Which State are you currently located in? (enter State name in Summary field):  [place]    list 5 tips/strategies to reduce Na intake

## 2022-09-14 NOTE — H&P ADULT - PROBLEM SELECTOR PLAN 1
CARE TRANSITIONS (HRTIC)    Attempted to contact patient for 3rd follow-up call in Care Transitions program. Left voicemail. Will attempt to contact at a later date/time.     Pt meeting sepsis criteria with fever 102.4 and wbc of 16.14 and possible pulmonary source. Lactate wnl at 1.6  - s/p ceftriaxone and azithro in ED, 1 L NS, and tylenol  - c/w ceft and azithro  - f/u U/A  - f/u bcx  - f/u urine legionella

## 2023-06-20 NOTE — ED ADULT NURSE NOTE - NSFALLRSKPASTHIST_ED_ALL_ED
no Topical Metronidazole Pregnancy And Lactation Text: This medication is Pregnancy Category B and considered safe during pregnancy.  It is also considered safe to use while breastfeeding.

## 2023-07-11 NOTE — DIETITIAN INITIAL EVALUATION ADULT. - PROBLEM SELECTOR PLAN 4
pt's bp low this visit - unable to reach physician. Instructed pt to drink 8 oz glass of gatoraid and in 2 hours, to call this nurse.  Family did call and bp up to 110/68 - Pt on Dexilant 60mg daily as outpt  - Start Protonix 40mg daily as inpatient

## 2023-10-03 NOTE — ED PROVIDER NOTE - ACUTE OR EVOLVING MI?
Pneumonia, aspiration Pneumonia, aspiration Pneumonia, aspiration Pneumonia, aspiration Pneumonia, aspiration no

## 2023-11-27 NOTE — ED PROVIDER NOTE - ADMIT DISPOSITION PRESENT ON ADMISSION SEPSIS Q1 - RE-EVALUATED PATIENT FLUID AND VITAL SIGNS
27-Nov-2023 17:35
I have re-evaluated the patient's fluid status and reviewed vital signs. Clinical perfusion assessment was performed.

## 2024-03-03 NOTE — ED ADULT TRIAGE NOTE - NS ED TRIAGE CLINICAL UPGRADE
Nephrology Deteriorating patient status - Patient was clinically upgraded due to deteriorating patient status.

## 2024-11-20 NOTE — ED ADULT TRIAGE NOTE - SOURCE OF INFORMATION
Patient presents for a dental limited to discuss tx planing for replacing missing teeth and verbally consents for treatment:  Reviewed health history-  Pt is ASA type II  Treatment consents signed: Yes  Perio: periodontitis  Pain Scale:0  Caries Assessment: moderate  Radiographs: Films are current  Oral Hygiene instruction reviewed and given  Hygiene recall visits recommended to the patient  Oral cancer screening done and no pathology noted on ROM, FOM , Palate,soft tissue or tongue.      Pt is missing posterior support on the mandible and some missing teeth on the maxilla. Severe grinding on the front upper and lower incisors but mostly on the upper incisors. Discussed with pt options of replacing missing teeth. Implants vs partial dentures vs fixed. Pt mentioned she is not interested in replacing the missing teeth but wants to fix the incisors teeth due to grinding. Explained that gaining posterior support will help not to overuse the front teeth and also to prevent fractures. Pt is aware but disclosed at this moment she wants to fix only the upper front incisors and not the posterior missing teeth. Explained if she gets the crowns on the incisors only she has a higher risk of breaking them and she would definitely need a mouth guard. Pt agreed and wants to send pre-auth for upper four front teeth #7,8,9,10 crowns.       All questions answered. Pt left satisfied and in good health.    Prognosis is Good.   Referrals Needed: No      Next visit: recall-check pre-auth for crowns #[id number]    Candi    Patient

## 2024-12-02 NOTE — PROGRESS NOTE ADULT - PROBLEM/PLAN-4
[facility] CLINICAL PHARMACY: ADHERENCE REVIEW  Identified care gap per [employer] fills at unknown : Statin adherence    Patient also appears to be prescribed: Statin    ASSESSMENT  STATIN ADHERENCE    Insurance Records claims through 2024 (Prior Year PDC = not reported; YTD PDC = 82%; Potential Fail Date: 24):   ATORVASTATIN TAB 40MG last filled on 24 for 90 day supply. Next refill due: 10/30/24    Prescribed si tablet/capsule daily    Per Reconcile Dispense History: last filled on 24 for 90 day supply.     Unknown Pharmacy    Lab Results   Component Value Date    CHOL 189 2023    TRIG 148 2023    HDL 29 (L) 2023     Lab Results   Component Value Date     2023      ALT   Date Value Ref Range Status   2023 15 5 - 41 U/L Final     AST   Date Value Ref Range Status   2023 16 <40 U/L Final     The ASCVD Risk score (Renetta BRAGG, et al., 2019) failed to calculate for the following reasons:    The patient has a prior MI or stroke diagnosis     PLAN  The following are interventions that have been identified:   Patient OVERDUE refilling ATORVASTATIN TAB 40MG and active on home medication list.     Attempting to reach patient to review.  Left message asking for return call. Letter sent to patient.    Recent Visits  Date Type Provider Dept   24 Office Visit Antoinette Velasquez APRN - CNP [facility]   Showing recent visits within past 540 days with a meds authorizing provider and meeting all other requirements  Future Appointments  Date Type Provider Dept   24 Appointment Antoinette Velasquez APRN - CNP [facility]   Showing future appointments within next 150 days with a meds authorizing provider and meeting all other requirements    Future Appointments   Date Time Provider Department Center   2024  3:00 PM Antoinette Velasquez APRN - CNP [facility]       [facility]  [facility] 
DISPLAY PLAN FREE TEXT

## 2025-03-08 NOTE — PHYSICAL THERAPY INITIAL EVALUATION ADULT - WEIGHT-BEARING RESTRICTIONS: STAND/SIT, REHAB EVAL
full weight-bearing
You can access the FollowMyHealth Patient Portal offered by [facility] by registering at the following website: [email or web address]. By joining [employer]’s FollowMyHealth portal, you will also be able to view your health information using other applications (apps) compatible with our system.
No